# Patient Record
Sex: MALE | Race: WHITE | NOT HISPANIC OR LATINO | Employment: OTHER | URBAN - METROPOLITAN AREA
[De-identification: names, ages, dates, MRNs, and addresses within clinical notes are randomized per-mention and may not be internally consistent; named-entity substitution may affect disease eponyms.]

---

## 2017-01-19 LAB
BILIRUB UR QL STRIP: NEGATIVE
CLARITY UR: ABNORMAL
COLOR UR: YELLOW
GLUCOSE UR STRIP-MCNC: NEGATIVE MG/DL
HGB UR QL STRIP.AUTO: ABNORMAL
KETONES UR QL STRIP: NEGATIVE
LEUKOCYTE ESTERASE UR QL STRIP.AUTO: ABNORMAL
NITRITE UR QL STRIP: NEGATIVE
PH UR STRIP.AUTO: 6 [PH] (ref 5–8)
PROT UR QL STRIP: ABNORMAL
SP GR UR STRIP: 1.02 (ref 1–1.03)
UROBILINOGEN UR QL STRIP: ABNORMAL

## 2017-01-19 PROCEDURE — 87186 SC STD MICRODIL/AGAR DIL: CPT | Performed by: EMERGENCY MEDICINE

## 2017-01-19 PROCEDURE — 51702 INSERT TEMP BLADDER CATH: CPT

## 2017-01-19 PROCEDURE — 81001 URINALYSIS AUTO W/SCOPE: CPT | Performed by: EMERGENCY MEDICINE

## 2017-01-19 PROCEDURE — 87147 CULTURE TYPE IMMUNOLOGIC: CPT | Performed by: EMERGENCY MEDICINE

## 2017-01-19 PROCEDURE — 99284 EMERGENCY DEPT VISIT MOD MDM: CPT

## 2017-01-19 PROCEDURE — 87086 URINE CULTURE/COLONY COUNT: CPT | Performed by: EMERGENCY MEDICINE

## 2017-01-20 ENCOUNTER — ANESTHESIA EVENT (OUTPATIENT)
Dept: PERIOP | Facility: HOSPITAL | Age: 77
End: 2017-01-20

## 2017-01-20 ENCOUNTER — APPOINTMENT (OUTPATIENT)
Dept: CT IMAGING | Facility: HOSPITAL | Age: 77
End: 2017-01-20

## 2017-01-20 ENCOUNTER — ANESTHESIA (OUTPATIENT)
Dept: PERIOP | Facility: HOSPITAL | Age: 77
End: 2017-01-20

## 2017-01-20 ENCOUNTER — HOSPITAL ENCOUNTER (INPATIENT)
Facility: HOSPITAL | Age: 77
LOS: 3 days | Discharge: HOME | End: 2017-01-23
Attending: EMERGENCY MEDICINE | Admitting: UROLOGY

## 2017-01-20 ENCOUNTER — APPOINTMENT (OUTPATIENT)
Dept: GENERAL RADIOLOGY | Facility: HOSPITAL | Age: 77
End: 2017-01-20

## 2017-01-20 DIAGNOSIS — N13.30 HYDRONEPHROSIS OF RIGHT KIDNEY: ICD-10-CM

## 2017-01-20 DIAGNOSIS — J45.901 ASTHMA EXACERBATION: ICD-10-CM

## 2017-01-20 DIAGNOSIS — N12 PYELONEPHRITIS: Primary | ICD-10-CM

## 2017-01-20 LAB
ALBUMIN SERPL-MCNC: 3.7 G/DL (ref 3.5–5.2)
ALBUMIN/GLOB SERPL: 1.4 G/DL
ALP SERPL-CCNC: 62 U/L (ref 39–117)
ALT SERPL W P-5'-P-CCNC: 17 U/L (ref 1–41)
ANION GAP SERPL CALCULATED.3IONS-SCNC: 12.3 MMOL/L
ANION GAP SERPL CALCULATED.3IONS-SCNC: 12.6 MMOL/L
AST SERPL-CCNC: 19 U/L (ref 1–40)
BACTERIA UR QL AUTO: ABNORMAL /HPF
BASOPHILS # BLD AUTO: 0.02 10*3/MM3 (ref 0–0.2)
BASOPHILS # BLD AUTO: 0.03 10*3/MM3 (ref 0–0.2)
BASOPHILS NFR BLD AUTO: 0.2 % (ref 0–1.5)
BASOPHILS NFR BLD AUTO: 0.2 % (ref 0–1.5)
BILIRUB SERPL-MCNC: 0.8 MG/DL (ref 0.1–1.2)
BUN BLD-MCNC: 21 MG/DL (ref 8–23)
BUN BLD-MCNC: 24 MG/DL (ref 8–23)
BUN/CREAT SERPL: 17.5 (ref 7–25)
BUN/CREAT SERPL: 19.2 (ref 7–25)
CALCIUM SPEC-SCNC: 8.3 MG/DL (ref 8.6–10.5)
CALCIUM SPEC-SCNC: 8.4 MG/DL (ref 8.6–10.5)
CHLORIDE SERPL-SCNC: 100 MMOL/L (ref 98–107)
CHLORIDE SERPL-SCNC: 101 MMOL/L (ref 98–107)
CO2 SERPL-SCNC: 24.4 MMOL/L (ref 22–29)
CO2 SERPL-SCNC: 26.7 MMOL/L (ref 22–29)
CREAT BLD-MCNC: 1.2 MG/DL (ref 0.76–1.27)
CREAT BLD-MCNC: 1.25 MG/DL (ref 0.76–1.27)
D-LACTATE SERPL-SCNC: 0.8 MMOL/L (ref 0.5–2)
DEPRECATED RDW RBC AUTO: 45.4 FL (ref 37–54)
DEPRECATED RDW RBC AUTO: 46.6 FL (ref 37–54)
EOSINOPHIL # BLD AUTO: 0.01 10*3/MM3 (ref 0–0.7)
EOSINOPHIL # BLD AUTO: 0.02 10*3/MM3 (ref 0–0.7)
EOSINOPHIL NFR BLD AUTO: 0.1 % (ref 0.3–6.2)
EOSINOPHIL NFR BLD AUTO: 0.2 % (ref 0.3–6.2)
ERYTHROCYTE [DISTWIDTH] IN BLOOD BY AUTOMATED COUNT: 13.1 % (ref 11.5–14.5)
ERYTHROCYTE [DISTWIDTH] IN BLOOD BY AUTOMATED COUNT: 13.2 % (ref 11.5–14.5)
GFR SERPL CREATININE-BSD FRML MDRD: 56 ML/MIN/1.73
GFR SERPL CREATININE-BSD FRML MDRD: 59 ML/MIN/1.73
GLOBULIN UR ELPH-MCNC: 2.7 GM/DL
GLUCOSE BLD-MCNC: 121 MG/DL (ref 65–99)
GLUCOSE BLD-MCNC: 131 MG/DL (ref 65–99)
HCT VFR BLD AUTO: 44.7 % (ref 40.4–52.2)
HCT VFR BLD AUTO: 45.9 % (ref 40.4–52.2)
HGB BLD-MCNC: 14.4 G/DL (ref 13.7–17.6)
HGB BLD-MCNC: 14.7 G/DL (ref 13.7–17.6)
HYALINE CASTS UR QL AUTO: ABNORMAL /LPF
IMM GRANULOCYTES # BLD: 0.04 10*3/MM3 (ref 0–0.03)
IMM GRANULOCYTES # BLD: 0.07 10*3/MM3 (ref 0–0.03)
IMM GRANULOCYTES NFR BLD: 0.3 % (ref 0–0.5)
IMM GRANULOCYTES NFR BLD: 0.5 % (ref 0–0.5)
LYMPHOCYTES # BLD AUTO: 0.92 10*3/MM3 (ref 0.9–4.8)
LYMPHOCYTES # BLD AUTO: 1.07 10*3/MM3 (ref 0.9–4.8)
LYMPHOCYTES NFR BLD AUTO: 7.3 % (ref 19.6–45.3)
LYMPHOCYTES NFR BLD AUTO: 8.4 % (ref 19.6–45.3)
MCH RBC QN AUTO: 30.8 PG (ref 27–32.7)
MCH RBC QN AUTO: 30.9 PG (ref 27–32.7)
MCHC RBC AUTO-ENTMCNC: 32 G/DL (ref 32.6–36.4)
MCHC RBC AUTO-ENTMCNC: 32.2 G/DL (ref 32.6–36.4)
MCV RBC AUTO: 95.9 FL (ref 79.8–96.2)
MCV RBC AUTO: 96 FL (ref 79.8–96.2)
MONOCYTES # BLD AUTO: 1.83 10*3/MM3 (ref 0.2–1.2)
MONOCYTES # BLD AUTO: 1.96 10*3/MM3 (ref 0.2–1.2)
MONOCYTES NFR BLD AUTO: 14.5 % (ref 5–12)
MONOCYTES NFR BLD AUTO: 15.4 % (ref 5–12)
MUCOUS THREADS URNS QL MICRO: ABNORMAL /HPF
NEUTROPHILS # BLD AUTO: 9.6 10*3/MM3 (ref 1.9–8.1)
NEUTROPHILS # BLD AUTO: 9.82 10*3/MM3 (ref 1.9–8.1)
NEUTROPHILS NFR BLD AUTO: 75.3 % (ref 42.7–76)
NEUTROPHILS NFR BLD AUTO: 77.6 % (ref 42.7–76)
PLATELET # BLD AUTO: 175 10*3/MM3 (ref 140–500)
PLATELET # BLD AUTO: 184 10*3/MM3 (ref 140–500)
PMV BLD AUTO: 10.2 FL (ref 6–12)
PMV BLD AUTO: 10.6 FL (ref 6–12)
POTASSIUM BLD-SCNC: 4 MMOL/L (ref 3.5–5.2)
POTASSIUM BLD-SCNC: 4 MMOL/L (ref 3.5–5.2)
PROT SERPL-MCNC: 6.4 G/DL (ref 6–8.5)
RBC # BLD AUTO: 4.66 10*6/MM3 (ref 4.6–6)
RBC # BLD AUTO: 4.78 10*6/MM3 (ref 4.6–6)
RBC # UR: ABNORMAL /HPF
REF LAB TEST METHOD: ABNORMAL
SODIUM BLD-SCNC: 137 MMOL/L (ref 136–145)
SODIUM BLD-SCNC: 140 MMOL/L (ref 136–145)
SQUAMOUS #/AREA URNS HPF: ABNORMAL /HPF
WBC NRBC COR # BLD: 12.65 10*3/MM3 (ref 4.5–10.7)
WBC NRBC COR # BLD: 12.74 10*3/MM3 (ref 4.5–10.7)
WBC UR QL AUTO: ABNORMAL /HPF

## 2017-01-20 PROCEDURE — 87186 SC STD MICRODIL/AGAR DIL: CPT | Performed by: EMERGENCY MEDICINE

## 2017-01-20 PROCEDURE — 0T768DZ DILATION OF RIGHT URETER WITH INTRALUMINAL DEVICE, VIA NATURAL OR ARTIFICIAL OPENING ENDOSCOPIC: ICD-10-PCS | Performed by: UROLOGY

## 2017-01-20 PROCEDURE — 87147 CULTURE TYPE IMMUNOLOGIC: CPT | Performed by: EMERGENCY MEDICINE

## 2017-01-20 PROCEDURE — 83605 ASSAY OF LACTIC ACID: CPT | Performed by: EMERGENCY MEDICINE

## 2017-01-20 PROCEDURE — 94640 AIRWAY INHALATION TREATMENT: CPT

## 2017-01-20 PROCEDURE — 25010000002 ONDANSETRON PER 1 MG: Performed by: ANESTHESIOLOGY

## 2017-01-20 PROCEDURE — 87086 URINE CULTURE/COLONY COUNT: CPT | Performed by: UROLOGY

## 2017-01-20 PROCEDURE — 74420 UROGRAPHY RTRGR +-KUB: CPT

## 2017-01-20 PROCEDURE — C2617 STENT, NON-COR, TEM W/O DEL: HCPCS | Performed by: UROLOGY

## 2017-01-20 PROCEDURE — 25010000003 CEFTRIAXONE PER 250 MG: Performed by: EMERGENCY MEDICINE

## 2017-01-20 PROCEDURE — BT140ZZ FLUOROSCOPY OF KIDNEYS, URETERS AND BLADDER USING HIGH OSMOLAR CONTRAST: ICD-10-PCS | Performed by: UROLOGY

## 2017-01-20 PROCEDURE — C1769 GUIDE WIRE: HCPCS | Performed by: UROLOGY

## 2017-01-20 PROCEDURE — 85025 COMPLETE CBC W/AUTO DIFF WBC: CPT | Performed by: EMERGENCY MEDICINE

## 2017-01-20 PROCEDURE — 25010000002 PROPOFOL 10 MG/ML EMULSION: Performed by: ANESTHESIOLOGY

## 2017-01-20 PROCEDURE — 87150 DNA/RNA AMPLIFIED PROBE: CPT | Performed by: EMERGENCY MEDICINE

## 2017-01-20 PROCEDURE — 25010000002 MIDAZOLAM PER 1 MG: Performed by: ANESTHESIOLOGY

## 2017-01-20 PROCEDURE — C1758 CATHETER, URETERAL: HCPCS | Performed by: UROLOGY

## 2017-01-20 PROCEDURE — 25010000002 FENTANYL CITRATE (PF) 100 MCG/2ML SOLUTION: Performed by: ANESTHESIOLOGY

## 2017-01-20 PROCEDURE — 80053 COMPREHEN METABOLIC PANEL: CPT | Performed by: EMERGENCY MEDICINE

## 2017-01-20 PROCEDURE — 74176 CT ABD & PELVIS W/O CONTRAST: CPT

## 2017-01-20 PROCEDURE — 0 IOTHALAMATE 60 % SOLUTION: Performed by: UROLOGY

## 2017-01-20 PROCEDURE — 85025 COMPLETE CBC W/AUTO DIFF WBC: CPT | Performed by: UROLOGY

## 2017-01-20 PROCEDURE — 87040 BLOOD CULTURE FOR BACTERIA: CPT | Performed by: EMERGENCY MEDICINE

## 2017-01-20 PROCEDURE — 25010000002 ENOXAPARIN PER 10 MG: Performed by: UROLOGY

## 2017-01-20 DEVICE — URETERAL STENT
Type: IMPLANTABLE DEVICE | Site: URETER | Status: FUNCTIONAL
Brand: CONTOUR™

## 2017-01-20 RX ORDER — ALBUTEROL SULFATE 1.25 MG/3ML
2.5 SOLUTION RESPIRATORY (INHALATION) EVERY 6 HOURS PRN
Status: DISCONTINUED | OUTPATIENT
Start: 2017-01-20 | End: 2017-01-23 | Stop reason: HOSPADM

## 2017-01-20 RX ORDER — LABETALOL HYDROCHLORIDE 5 MG/ML
5 INJECTION, SOLUTION INTRAVENOUS
Status: DISCONTINUED | OUTPATIENT
Start: 2017-01-20 | End: 2017-01-20 | Stop reason: HOSPADM

## 2017-01-20 RX ORDER — LIDOCAINE HYDROCHLORIDE 10 MG/ML
0.5 INJECTION, SOLUTION EPIDURAL; INFILTRATION; INTRACAUDAL; PERINEURAL ONCE AS NEEDED
Status: DISCONTINUED | OUTPATIENT
Start: 2017-01-20 | End: 2017-01-20 | Stop reason: HOSPADM

## 2017-01-20 RX ORDER — PANTOPRAZOLE SODIUM 40 MG/1
40 TABLET, DELAYED RELEASE ORAL
Status: DISCONTINUED | OUTPATIENT
Start: 2017-01-21 | End: 2017-01-23 | Stop reason: HOSPADM

## 2017-01-20 RX ORDER — ONDANSETRON 2 MG/ML
4 INJECTION INTRAMUSCULAR; INTRAVENOUS EVERY 6 HOURS PRN
Status: DISCONTINUED | OUTPATIENT
Start: 2017-01-20 | End: 2017-01-23 | Stop reason: HOSPADM

## 2017-01-20 RX ORDER — HYDROCODONE BITARTRATE AND ACETAMINOPHEN 5; 325 MG/1; MG/1
1 TABLET ORAL EVERY 6 HOURS PRN
Status: ON HOLD | COMMUNITY
End: 2017-03-08

## 2017-01-20 RX ORDER — HYDROCODONE BITARTRATE AND ACETAMINOPHEN 5; 325 MG/1; MG/1
1 TABLET ORAL EVERY 6 HOURS PRN
Status: DISCONTINUED | OUTPATIENT
Start: 2017-01-20 | End: 2017-01-23 | Stop reason: HOSPADM

## 2017-01-20 RX ORDER — FAMOTIDINE 10 MG/ML
20 INJECTION, SOLUTION INTRAVENOUS ONCE
Status: COMPLETED | OUTPATIENT
Start: 2017-01-20 | End: 2017-01-20

## 2017-01-20 RX ORDER — ALBUTEROL SULFATE 90 UG/1
2 AEROSOL, METERED RESPIRATORY (INHALATION) EVERY 4 HOURS PRN
Status: DISCONTINUED | OUTPATIENT
Start: 2017-01-20 | End: 2017-01-20 | Stop reason: CLARIF

## 2017-01-20 RX ORDER — FENTANYL CITRATE 50 UG/ML
50 INJECTION, SOLUTION INTRAMUSCULAR; INTRAVENOUS
Status: DISCONTINUED | OUTPATIENT
Start: 2017-01-20 | End: 2017-01-20 | Stop reason: HOSPADM

## 2017-01-20 RX ORDER — SUMATRIPTAN 50 MG/1
50 TABLET, FILM COATED ORAL
Status: DISCONTINUED | OUTPATIENT
Start: 2017-01-20 | End: 2017-01-23 | Stop reason: HOSPADM

## 2017-01-20 RX ORDER — MIDAZOLAM HYDROCHLORIDE 1 MG/ML
2 INJECTION INTRAMUSCULAR; INTRAVENOUS
Status: DISCONTINUED | OUTPATIENT
Start: 2017-01-20 | End: 2017-01-20 | Stop reason: HOSPADM

## 2017-01-20 RX ORDER — SODIUM CHLORIDE 0.9 % (FLUSH) 0.9 %
1-10 SYRINGE (ML) INJECTION AS NEEDED
Status: DISCONTINUED | OUTPATIENT
Start: 2017-01-20 | End: 2017-01-23 | Stop reason: HOSPADM

## 2017-01-20 RX ORDER — PREDNISONE 20 MG/1
20 TABLET ORAL DAILY
Status: DISCONTINUED | OUTPATIENT
Start: 2017-01-20 | End: 2017-01-21

## 2017-01-20 RX ORDER — PROMETHAZINE HYDROCHLORIDE 25 MG/ML
12.5 INJECTION, SOLUTION INTRAMUSCULAR; INTRAVENOUS ONCE AS NEEDED
Status: DISCONTINUED | OUTPATIENT
Start: 2017-01-20 | End: 2017-01-20 | Stop reason: HOSPADM

## 2017-01-20 RX ORDER — LIDOCAINE HYDROCHLORIDE 20 MG/ML
INJECTION, SOLUTION INFILTRATION; PERINEURAL AS NEEDED
Status: DISCONTINUED | OUTPATIENT
Start: 2017-01-20 | End: 2017-01-20 | Stop reason: SURG

## 2017-01-20 RX ORDER — HYDRALAZINE HYDROCHLORIDE 20 MG/ML
5 INJECTION INTRAMUSCULAR; INTRAVENOUS
Status: DISCONTINUED | OUTPATIENT
Start: 2017-01-20 | End: 2017-01-20 | Stop reason: HOSPADM

## 2017-01-20 RX ORDER — SODIUM CHLORIDE 0.9 % (FLUSH) 0.9 %
1-10 SYRINGE (ML) INJECTION AS NEEDED
Status: DISCONTINUED | OUTPATIENT
Start: 2017-01-20 | End: 2017-01-20 | Stop reason: HOSPADM

## 2017-01-20 RX ORDER — ONDANSETRON 2 MG/ML
INJECTION INTRAMUSCULAR; INTRAVENOUS AS NEEDED
Status: DISCONTINUED | OUTPATIENT
Start: 2017-01-20 | End: 2017-01-20 | Stop reason: SURG

## 2017-01-20 RX ORDER — CEFTRIAXONE SODIUM 2 G/50ML
2 INJECTION, SOLUTION INTRAVENOUS ONCE
Status: COMPLETED | OUTPATIENT
Start: 2017-01-20 | End: 2017-01-20

## 2017-01-20 RX ORDER — GLYCOPYRROLATE 0.2 MG/ML
INJECTION INTRAMUSCULAR; INTRAVENOUS AS NEEDED
Status: DISCONTINUED | OUTPATIENT
Start: 2017-01-20 | End: 2017-01-20 | Stop reason: SURG

## 2017-01-20 RX ORDER — DIPHENHYDRAMINE HYDROCHLORIDE 50 MG/ML
12.5 INJECTION INTRAMUSCULAR; INTRAVENOUS
Status: DISCONTINUED | OUTPATIENT
Start: 2017-01-20 | End: 2017-01-20 | Stop reason: HOSPADM

## 2017-01-20 RX ORDER — SUMATRIPTAN 25 MG/1
50 TABLET, FILM COATED ORAL
Status: DISCONTINUED | OUTPATIENT
Start: 2017-01-20 | End: 2017-01-23 | Stop reason: HOSPADM

## 2017-01-20 RX ORDER — PROPOFOL 10 MG/ML
VIAL (ML) INTRAVENOUS AS NEEDED
Status: DISCONTINUED | OUTPATIENT
Start: 2017-01-20 | End: 2017-01-20 | Stop reason: SURG

## 2017-01-20 RX ORDER — HYDROMORPHONE HYDROCHLORIDE 1 MG/ML
0.5 INJECTION, SOLUTION INTRAMUSCULAR; INTRAVENOUS; SUBCUTANEOUS
Status: DISCONTINUED | OUTPATIENT
Start: 2017-01-20 | End: 2017-01-23 | Stop reason: HOSPADM

## 2017-01-20 RX ORDER — NALOXONE HCL 0.4 MG/ML
0.4 VIAL (ML) INJECTION
Status: DISCONTINUED | OUTPATIENT
Start: 2017-01-20 | End: 2017-01-23 | Stop reason: HOSPADM

## 2017-01-20 RX ORDER — ACETAMINOPHEN 500 MG
1000 TABLET ORAL ONCE
Status: COMPLETED | OUTPATIENT
Start: 2017-01-20 | End: 2017-01-20

## 2017-01-20 RX ORDER — PROMETHAZINE HYDROCHLORIDE 25 MG/1
25 TABLET ORAL ONCE AS NEEDED
Status: DISCONTINUED | OUTPATIENT
Start: 2017-01-20 | End: 2017-01-20 | Stop reason: HOSPADM

## 2017-01-20 RX ORDER — MAGNESIUM HYDROXIDE 1200 MG/15ML
LIQUID ORAL AS NEEDED
Status: DISCONTINUED | OUTPATIENT
Start: 2017-01-20 | End: 2017-01-20 | Stop reason: HOSPADM

## 2017-01-20 RX ORDER — BUDESONIDE 0.5 MG/2ML
0.5 INHALANT ORAL
Status: DISCONTINUED | OUTPATIENT
Start: 2017-01-20 | End: 2017-01-23 | Stop reason: HOSPADM

## 2017-01-20 RX ORDER — ATORVASTATIN CALCIUM 20 MG/1
20 TABLET, FILM COATED ORAL DAILY
Status: DISCONTINUED | OUTPATIENT
Start: 2017-01-20 | End: 2017-01-23 | Stop reason: HOSPADM

## 2017-01-20 RX ORDER — SODIUM CHLORIDE, SODIUM LACTATE, POTASSIUM CHLORIDE, CALCIUM CHLORIDE 600; 310; 30; 20 MG/100ML; MG/100ML; MG/100ML; MG/100ML
9 INJECTION, SOLUTION INTRAVENOUS CONTINUOUS PRN
Status: DISCONTINUED | OUTPATIENT
Start: 2017-01-20 | End: 2017-01-20 | Stop reason: HOSPADM

## 2017-01-20 RX ORDER — ALBUTEROL SULFATE 2.5 MG/3ML
2.5 SOLUTION RESPIRATORY (INHALATION) EVERY 4 HOURS PRN
Status: DISCONTINUED | OUTPATIENT
Start: 2017-01-20 | End: 2017-01-23 | Stop reason: HOSPADM

## 2017-01-20 RX ORDER — PROMETHAZINE HYDROCHLORIDE 25 MG/1
25 SUPPOSITORY RECTAL ONCE AS NEEDED
Status: DISCONTINUED | OUTPATIENT
Start: 2017-01-20 | End: 2017-01-20 | Stop reason: HOSPADM

## 2017-01-20 RX ORDER — SODIUM CHLORIDE 9 MG/ML
50 INJECTION, SOLUTION INTRAVENOUS CONTINUOUS
Status: DISCONTINUED | OUTPATIENT
Start: 2017-01-20 | End: 2017-01-23 | Stop reason: HOSPADM

## 2017-01-20 RX ORDER — ONDANSETRON 2 MG/ML
4 INJECTION INTRAMUSCULAR; INTRAVENOUS ONCE AS NEEDED
Status: DISCONTINUED | OUTPATIENT
Start: 2017-01-20 | End: 2017-01-20 | Stop reason: HOSPADM

## 2017-01-20 RX ORDER — FENTANYL CITRATE 50 UG/ML
INJECTION, SOLUTION INTRAMUSCULAR; INTRAVENOUS AS NEEDED
Status: DISCONTINUED | OUTPATIENT
Start: 2017-01-20 | End: 2017-01-20 | Stop reason: SURG

## 2017-01-20 RX ORDER — MIDAZOLAM HYDROCHLORIDE 1 MG/ML
1 INJECTION INTRAMUSCULAR; INTRAVENOUS
Status: DISCONTINUED | OUTPATIENT
Start: 2017-01-20 | End: 2017-01-20 | Stop reason: HOSPADM

## 2017-01-20 RX ORDER — HYDROMORPHONE HYDROCHLORIDE 1 MG/ML
0.25 INJECTION, SOLUTION INTRAMUSCULAR; INTRAVENOUS; SUBCUTANEOUS
Status: DISCONTINUED | OUTPATIENT
Start: 2017-01-20 | End: 2017-01-20 | Stop reason: HOSPADM

## 2017-01-20 RX ADMIN — FENTANYL CITRATE 50 MCG: 50 INJECTION INTRAMUSCULAR; INTRAVENOUS at 18:01

## 2017-01-20 RX ADMIN — CEFTRIAXONE SODIUM 2 G: 2 INJECTION, SOLUTION INTRAVENOUS at 02:52

## 2017-01-20 RX ADMIN — ACETAMINOPHEN 1000 MG: 500 TABLET ORAL at 02:53

## 2017-01-20 RX ADMIN — MIDAZOLAM 1 MG: 1 INJECTION INTRAMUSCULAR; INTRAVENOUS at 15:35

## 2017-01-20 RX ADMIN — SODIUM CHLORIDE 100 ML/HR: 9 INJECTION, SOLUTION INTRAVENOUS at 08:17

## 2017-01-20 RX ADMIN — BUDESONIDE 0.5 MG: 0.5 INHALANT RESPIRATORY (INHALATION) at 20:17

## 2017-01-20 RX ADMIN — SODIUM CHLORIDE, POTASSIUM CHLORIDE, SODIUM LACTATE AND CALCIUM CHLORIDE 9 ML/HR: 600; 310; 30; 20 INJECTION, SOLUTION INTRAVENOUS at 14:55

## 2017-01-20 RX ADMIN — SODIUM CHLORIDE 100 ML/HR: 9 INJECTION, SOLUTION INTRAVENOUS at 22:09

## 2017-01-20 RX ADMIN — FAMOTIDINE 20 MG: 10 INJECTION, SOLUTION INTRAVENOUS at 15:35

## 2017-01-20 RX ADMIN — ENOXAPARIN SODIUM 40 MG: 40 INJECTION SUBCUTANEOUS at 08:17

## 2017-01-20 RX ADMIN — HYDROCODONE BITARTRATE AND ACETAMINOPHEN 1 TABLET: 5; 325 TABLET ORAL at 21:59

## 2017-01-20 RX ADMIN — ONDANSETRON 4 MG: 2 INJECTION INTRAMUSCULAR; INTRAVENOUS at 16:17

## 2017-01-20 RX ADMIN — GLYCOPYRROLATE 0.2 MG: 0.2 INJECTION INTRAMUSCULAR; INTRAVENOUS at 16:05

## 2017-01-20 RX ADMIN — FENTANYL CITRATE 50 MCG: 50 INJECTION INTRAMUSCULAR; INTRAVENOUS at 15:35

## 2017-01-20 RX ADMIN — LIDOCAINE HYDROCHLORIDE 60 MG: 20 INJECTION, SOLUTION INFILTRATION; PERINEURAL at 16:05

## 2017-01-20 RX ADMIN — PROPOFOL 150 MG: 10 INJECTION, EMULSION INTRAVENOUS at 16:05

## 2017-01-20 RX ADMIN — SUMATRIPTAN SUCCINATE 50 MG: 25 TABLET ORAL at 10:42

## 2017-01-20 RX ADMIN — SODIUM CHLORIDE 100 ML/HR: 9 INJECTION, SOLUTION INTRAVENOUS at 18:23

## 2017-01-20 RX ADMIN — FENTANYL CITRATE 50 MCG: 50 INJECTION INTRAMUSCULAR; INTRAVENOUS at 16:05

## 2017-01-20 NOTE — ANESTHESIA PREPROCEDURE EVALUATION
Anesthesia Evaluation      No history of anesthetic complications   Airway   Mallampati: III  no difficulty expected  Dental - normal exam     Pulmonary - normal exam   (+) asthma,   (-) COPD, sleep apnea, not a smoker    PE comment: nonlabored  Cardiovascular - negative cardio ROS and normal exam  (-) hypertension, valvular problems/murmurs, past MI, CAD, dysrhythmias, angina    Rhythm: regular  Rate: normal    Neuro/Psych  (+) headaches (migraines),    (-) seizures, TIA, CVA  GI/Hepatic/Renal/Endo    (+)  chronic renal disease (pyelonephritis, hydronephrosis), hypothyroidism,   (-) GERD, liver disease, diabetes, hyperthyroidism    Musculoskeletal     (+) back pain,   Abdominal    Substance History      OB/GYN          Other         Other Comment: HLD;  BPH                        Anesthesia Plan    ASA 2     general     intravenous induction   Anesthetic plan and risks discussed with patient.

## 2017-01-20 NOTE — ANESTHESIA PROCEDURE NOTES
Airway  Urgency: elective    Airway not difficult    General Information and Staff    Patient location during procedure: OR  Anesthesiologist: CONCETTA ZULUAGA    Indications and Patient Condition  Indications for airway management: airway protection    Preoxygenated: yes      Final Airway Details  Final airway type: supraglottic airway      Successful airway: classic  Size 5    Number of attempts at approach: 1

## 2017-01-20 NOTE — ANESTHESIA POSTPROCEDURE EVALUATION
Patient: Asa Magaña    Procedure Summary     Date Anesthesia Start Anesthesia Stop Room / Location    01/20/17 4932 [id number]  [facility] / [facility]       Procedure Diagnosis Surgeon Provider    CYSTOSCOPY, ureteroscopy,  URETERAL STENT INSERTION, RT (Right ) Hydronephrosis of right kidney; Vesicoureteral reflux; Pyelonephritis, acute MD Checo Chiu MD          Anesthesia Type: general  Last vitals  BP      Temp 36.9 °C (98.5 °F) (01/20/17 1633)    Pulse     Resp      SpO2        Post Anesthesia Care and Evaluation    Patient location during evaluation: bedside  Level of consciousness: awake  Pain score: 1  Pain management: adequate  Airway patency: patent  Anesthetic complications: No anesthetic complications    Cardiovascular status: acceptable  Respiratory status: acceptable  Hydration status: acceptable

## 2017-01-21 LAB
ANION GAP SERPL CALCULATED.3IONS-SCNC: 11.8 MMOL/L
BACTERIA BLD CULT: ABNORMAL
BASOPHILS # BLD AUTO: 0.04 10*3/MM3 (ref 0–0.2)
BASOPHILS NFR BLD AUTO: 0.4 % (ref 0–1.5)
BUN BLD-MCNC: 20 MG/DL (ref 8–23)
BUN/CREAT SERPL: 18.7 (ref 7–25)
CALCIUM SPEC-SCNC: 8.2 MG/DL (ref 8.6–10.5)
CHLORIDE SERPL-SCNC: 102 MMOL/L (ref 98–107)
CO2 SERPL-SCNC: 24.2 MMOL/L (ref 22–29)
CREAT BLD-MCNC: 1.07 MG/DL (ref 0.76–1.27)
DEPRECATED RDW RBC AUTO: 47.3 FL (ref 37–54)
EOSINOPHIL # BLD AUTO: 0.08 10*3/MM3 (ref 0–0.7)
EOSINOPHIL NFR BLD AUTO: 0.7 % (ref 0.3–6.2)
ERYTHROCYTE [DISTWIDTH] IN BLOOD BY AUTOMATED COUNT: 13.2 % (ref 11.5–14.5)
GFR SERPL CREATININE-BSD FRML MDRD: 68 ML/MIN/1.73
GLUCOSE BLD-MCNC: 155 MG/DL (ref 65–99)
HCT VFR BLD AUTO: 44.5 % (ref 40.4–52.2)
HGB BLD-MCNC: 14.2 G/DL (ref 13.7–17.6)
IMM GRANULOCYTES # BLD: 0.07 10*3/MM3 (ref 0–0.03)
IMM GRANULOCYTES NFR BLD: 0.6 % (ref 0–0.5)
LYMPHOCYTES # BLD AUTO: 1.27 10*3/MM3 (ref 0.9–4.8)
LYMPHOCYTES NFR BLD AUTO: 11.6 % (ref 19.6–45.3)
MCH RBC QN AUTO: 31.1 PG (ref 27–32.7)
MCHC RBC AUTO-ENTMCNC: 31.9 G/DL (ref 32.6–36.4)
MCV RBC AUTO: 97.6 FL (ref 79.8–96.2)
MONOCYTES # BLD AUTO: 1.29 10*3/MM3 (ref 0.2–1.2)
MONOCYTES NFR BLD AUTO: 11.8 % (ref 5–12)
NEUTROPHILS # BLD AUTO: 8.21 10*3/MM3 (ref 1.9–8.1)
NEUTROPHILS NFR BLD AUTO: 74.9 % (ref 42.7–76)
PLATELET # BLD AUTO: 178 10*3/MM3 (ref 140–500)
PMV BLD AUTO: 10.2 FL (ref 6–12)
POTASSIUM BLD-SCNC: 4.1 MMOL/L (ref 3.5–5.2)
RBC # BLD AUTO: 4.56 10*6/MM3 (ref 4.6–6)
SODIUM BLD-SCNC: 138 MMOL/L (ref 136–145)
WBC NRBC COR # BLD: 10.96 10*3/MM3 (ref 4.5–10.7)

## 2017-01-21 PROCEDURE — 85025 COMPLETE CBC W/AUTO DIFF WBC: CPT | Performed by: UROLOGY

## 2017-01-21 PROCEDURE — 87040 BLOOD CULTURE FOR BACTERIA: CPT | Performed by: UROLOGY

## 2017-01-21 PROCEDURE — 25010000002 VANCOMYCIN: Performed by: INTERNAL MEDICINE

## 2017-01-21 PROCEDURE — 25010000002 ENOXAPARIN PER 10 MG: Performed by: UROLOGY

## 2017-01-21 PROCEDURE — 80048 BASIC METABOLIC PNL TOTAL CA: CPT | Performed by: UROLOGY

## 2017-01-21 PROCEDURE — 94640 AIRWAY INHALATION TREATMENT: CPT

## 2017-01-21 RX ADMIN — ENOXAPARIN SODIUM 40 MG: 40 INJECTION SUBCUTANEOUS at 08:57

## 2017-01-21 RX ADMIN — VANCOMYCIN HYDROCHLORIDE 1750 MG: 1 INJECTION, POWDER, LYOPHILIZED, FOR SOLUTION INTRAVENOUS at 14:47

## 2017-01-21 RX ADMIN — HYDROCODONE BITARTRATE AND ACETAMINOPHEN 1 TABLET: 5; 325 TABLET ORAL at 06:30

## 2017-01-21 RX ADMIN — HYDROCODONE BITARTRATE AND ACETAMINOPHEN 1 TABLET: 5; 325 TABLET ORAL at 13:48

## 2017-01-21 RX ADMIN — PANTOPRAZOLE SODIUM 40 MG: 40 TABLET, DELAYED RELEASE ORAL at 06:30

## 2017-01-21 RX ADMIN — BUDESONIDE 0.5 MG: 0.5 INHALANT RESPIRATORY (INHALATION) at 23:34

## 2017-01-21 RX ADMIN — SODIUM CHLORIDE 100 ML/HR: 9 INJECTION, SOLUTION INTRAVENOUS at 14:50

## 2017-01-21 RX ADMIN — ATORVASTATIN CALCIUM 20 MG: 20 TABLET, FILM COATED ORAL at 08:57

## 2017-01-21 RX ADMIN — SODIUM CHLORIDE 100 ML/HR: 9 INJECTION, SOLUTION INTRAVENOUS at 06:56

## 2017-01-22 LAB
ANION GAP SERPL CALCULATED.3IONS-SCNC: 8.7 MMOL/L
BACTERIA SPEC AEROBE CULT: ABNORMAL
BACTERIA SPEC AEROBE CULT: NO GROWTH
BASOPHILS # BLD AUTO: 0.02 10*3/MM3 (ref 0–0.2)
BASOPHILS NFR BLD AUTO: 0.3 % (ref 0–1.5)
BUN BLD-MCNC: 16 MG/DL (ref 8–23)
BUN/CREAT SERPL: 15.1 (ref 7–25)
CALCIUM SPEC-SCNC: 7.9 MG/DL (ref 8.6–10.5)
CHLORIDE SERPL-SCNC: 103 MMOL/L (ref 98–107)
CO2 SERPL-SCNC: 26.3 MMOL/L (ref 22–29)
CREAT BLD-MCNC: 1.06 MG/DL (ref 0.76–1.27)
DEPRECATED RDW RBC AUTO: 46.8 FL (ref 37–54)
EOSINOPHIL # BLD AUTO: 0.11 10*3/MM3 (ref 0–0.7)
EOSINOPHIL NFR BLD AUTO: 1.4 % (ref 0.3–6.2)
ERYTHROCYTE [DISTWIDTH] IN BLOOD BY AUTOMATED COUNT: 13.1 % (ref 11.5–14.5)
GFR SERPL CREATININE-BSD FRML MDRD: 68 ML/MIN/1.73
GLUCOSE BLD-MCNC: 108 MG/DL (ref 65–99)
HCT VFR BLD AUTO: 40.8 % (ref 40.4–52.2)
HGB BLD-MCNC: 12.9 G/DL (ref 13.7–17.6)
IMM GRANULOCYTES # BLD: 0.04 10*3/MM3 (ref 0–0.03)
IMM GRANULOCYTES NFR BLD: 0.5 % (ref 0–0.5)
LYMPHOCYTES # BLD AUTO: 0.94 10*3/MM3 (ref 0.9–4.8)
LYMPHOCYTES NFR BLD AUTO: 11.8 % (ref 19.6–45.3)
MCH RBC QN AUTO: 30.9 PG (ref 27–32.7)
MCHC RBC AUTO-ENTMCNC: 31.6 G/DL (ref 32.6–36.4)
MCV RBC AUTO: 97.6 FL (ref 79.8–96.2)
MONOCYTES # BLD AUTO: 1.03 10*3/MM3 (ref 0.2–1.2)
MONOCYTES NFR BLD AUTO: 13 % (ref 5–12)
NEUTROPHILS # BLD AUTO: 5.81 10*3/MM3 (ref 1.9–8.1)
NEUTROPHILS NFR BLD AUTO: 73 % (ref 42.7–76)
PLATELET # BLD AUTO: 166 10*3/MM3 (ref 140–500)
PMV BLD AUTO: 10.2 FL (ref 6–12)
POTASSIUM BLD-SCNC: 4.3 MMOL/L (ref 3.5–5.2)
RBC # BLD AUTO: 4.18 10*6/MM3 (ref 4.6–6)
SODIUM BLD-SCNC: 138 MMOL/L (ref 136–145)
WBC NRBC COR # BLD: 7.95 10*3/MM3 (ref 4.5–10.7)

## 2017-01-22 PROCEDURE — 80048 BASIC METABOLIC PNL TOTAL CA: CPT | Performed by: UROLOGY

## 2017-01-22 PROCEDURE — 85025 COMPLETE CBC W/AUTO DIFF WBC: CPT | Performed by: UROLOGY

## 2017-01-22 PROCEDURE — 25010000002 HYDROMORPHONE PER 4 MG: Performed by: UROLOGY

## 2017-01-22 PROCEDURE — 25010000002 VANCOMYCIN: Performed by: INTERNAL MEDICINE

## 2017-01-22 PROCEDURE — 25010000002 ENOXAPARIN PER 10 MG: Performed by: UROLOGY

## 2017-01-22 RX ADMIN — SODIUM CHLORIDE 50 ML/HR: 9 INJECTION, SOLUTION INTRAVENOUS at 22:30

## 2017-01-22 RX ADMIN — VANCOMYCIN HYDROCHLORIDE 1500 MG: 1 INJECTION, POWDER, LYOPHILIZED, FOR SOLUTION INTRAVENOUS at 17:02

## 2017-01-22 RX ADMIN — HYDROCODONE BITARTRATE AND ACETAMINOPHEN 1 TABLET: 5; 325 TABLET ORAL at 21:52

## 2017-01-22 RX ADMIN — HYDROMORPHONE HYDROCHLORIDE 0.5 MG: 1 INJECTION, SOLUTION INTRAMUSCULAR; INTRAVENOUS; SUBCUTANEOUS at 22:20

## 2017-01-22 RX ADMIN — PANTOPRAZOLE SODIUM 40 MG: 40 TABLET, DELAYED RELEASE ORAL at 05:40

## 2017-01-22 RX ADMIN — HYDROMORPHONE HYDROCHLORIDE 0.5 MG: 1 INJECTION, SOLUTION INTRAMUSCULAR; INTRAVENOUS; SUBCUTANEOUS at 20:13

## 2017-01-22 RX ADMIN — ENOXAPARIN SODIUM 40 MG: 40 INJECTION SUBCUTANEOUS at 09:04

## 2017-01-22 RX ADMIN — ATORVASTATIN CALCIUM 20 MG: 20 TABLET, FILM COATED ORAL at 09:03

## 2017-01-22 RX ADMIN — HYDROCODONE BITARTRATE AND ACETAMINOPHEN 1 TABLET: 5; 325 TABLET ORAL at 05:40

## 2017-01-22 RX ADMIN — VANCOMYCIN HYDROCHLORIDE 1500 MG: 1 INJECTION, POWDER, LYOPHILIZED, FOR SOLUTION INTRAVENOUS at 03:34

## 2017-01-22 RX ADMIN — HYDROCODONE BITARTRATE AND ACETAMINOPHEN 1 TABLET: 5; 325 TABLET ORAL at 12:33

## 2017-01-23 VITALS
HEART RATE: 63 BPM | TEMPERATURE: 97.2 F | BODY MASS INDEX: 27.11 KG/M2 | HEIGHT: 72 IN | SYSTOLIC BLOOD PRESSURE: 131 MMHG | RESPIRATION RATE: 16 BRPM | DIASTOLIC BLOOD PRESSURE: 65 MMHG | WEIGHT: 200.19 LBS | OXYGEN SATURATION: 99 %

## 2017-01-23 LAB
ANION GAP SERPL CALCULATED.3IONS-SCNC: 10.2 MMOL/L
BACTERIA SPEC AEROBE CULT: ABNORMAL
BASOPHILS # BLD AUTO: 0.01 10*3/MM3 (ref 0–0.2)
BASOPHILS NFR BLD AUTO: 0.1 % (ref 0–1.5)
BUN BLD-MCNC: 15 MG/DL (ref 8–23)
BUN/CREAT SERPL: 14.2 (ref 7–25)
CALCIUM SPEC-SCNC: 8.5 MG/DL (ref 8.6–10.5)
CHLORIDE SERPL-SCNC: 102 MMOL/L (ref 98–107)
CO2 SERPL-SCNC: 27.8 MMOL/L (ref 22–29)
CREAT BLD-MCNC: 1.06 MG/DL (ref 0.76–1.27)
DEPRECATED RDW RBC AUTO: 44 FL (ref 37–54)
EOSINOPHIL # BLD AUTO: 0.09 10*3/MM3 (ref 0–0.7)
EOSINOPHIL NFR BLD AUTO: 1.2 % (ref 0.3–6.2)
ERYTHROCYTE [DISTWIDTH] IN BLOOD BY AUTOMATED COUNT: 12.7 % (ref 11.5–14.5)
GFR SERPL CREATININE-BSD FRML MDRD: 68 ML/MIN/1.73
GLUCOSE BLD-MCNC: 133 MG/DL (ref 65–99)
GRAM STN SPEC: ABNORMAL
HCT VFR BLD AUTO: 42.2 % (ref 40.4–52.2)
HGB BLD-MCNC: 13.8 G/DL (ref 13.7–17.6)
IMM GRANULOCYTES # BLD: 0.03 10*3/MM3 (ref 0–0.03)
IMM GRANULOCYTES NFR BLD: 0.4 % (ref 0–0.5)
LYMPHOCYTES # BLD AUTO: 1.1 10*3/MM3 (ref 0.9–4.8)
LYMPHOCYTES NFR BLD AUTO: 14.9 % (ref 19.6–45.3)
MCH RBC QN AUTO: 31 PG (ref 27–32.7)
MCHC RBC AUTO-ENTMCNC: 32.7 G/DL (ref 32.6–36.4)
MCV RBC AUTO: 94.8 FL (ref 79.8–96.2)
MONOCYTES # BLD AUTO: 0.8 10*3/MM3 (ref 0.2–1.2)
MONOCYTES NFR BLD AUTO: 10.8 % (ref 5–12)
NEUTROPHILS # BLD AUTO: 5.37 10*3/MM3 (ref 1.9–8.1)
NEUTROPHILS NFR BLD AUTO: 72.6 % (ref 42.7–76)
PLATELET # BLD AUTO: 195 10*3/MM3 (ref 140–500)
PMV BLD AUTO: 10.5 FL (ref 6–12)
POTASSIUM BLD-SCNC: 4 MMOL/L (ref 3.5–5.2)
RBC # BLD AUTO: 4.45 10*6/MM3 (ref 4.6–6)
SODIUM BLD-SCNC: 140 MMOL/L (ref 136–145)
WBC NRBC COR # BLD: 7.4 10*3/MM3 (ref 4.5–10.7)

## 2017-01-23 PROCEDURE — 25010000002 ENOXAPARIN PER 10 MG: Performed by: UROLOGY

## 2017-01-23 PROCEDURE — 85025 COMPLETE CBC W/AUTO DIFF WBC: CPT | Performed by: UROLOGY

## 2017-01-23 PROCEDURE — 80048 BASIC METABOLIC PNL TOTAL CA: CPT | Performed by: UROLOGY

## 2017-01-23 PROCEDURE — 25010000002 VANCOMYCIN: Performed by: INTERNAL MEDICINE

## 2017-01-23 RX ORDER — DOXYCYCLINE 100 MG/1
100 CAPSULE ORAL EVERY 12 HOURS SCHEDULED
Status: DISCONTINUED | OUTPATIENT
Start: 2017-01-23 | End: 2017-01-23 | Stop reason: HOSPADM

## 2017-01-23 RX ORDER — DOXYCYCLINE 100 MG/1
100 CAPSULE ORAL EVERY 12 HOURS SCHEDULED
Qty: 20 CAPSULE | Refills: 0 | Status: SHIPPED | OUTPATIENT
Start: 2017-01-23 | End: 2017-02-02

## 2017-01-23 RX ADMIN — ENOXAPARIN SODIUM 40 MG: 40 INJECTION SUBCUTANEOUS at 09:51

## 2017-01-23 RX ADMIN — PANTOPRAZOLE SODIUM 40 MG: 40 TABLET, DELAYED RELEASE ORAL at 06:27

## 2017-01-23 RX ADMIN — ATROPA BELLADONNA AND OPIUM 30 MG: 16.2; 3 SUPPOSITORY RECTAL at 09:51

## 2017-01-23 RX ADMIN — VANCOMYCIN HYDROCHLORIDE 1500 MG: 1 INJECTION, POWDER, LYOPHILIZED, FOR SOLUTION INTRAVENOUS at 03:09

## 2017-01-23 RX ADMIN — ATORVASTATIN CALCIUM 20 MG: 20 TABLET, FILM COATED ORAL at 09:51

## 2017-01-26 LAB — BACTERIA SPEC AEROBE CULT: NORMAL

## 2017-03-08 ENCOUNTER — APPOINTMENT (OUTPATIENT)
Dept: GENERAL RADIOLOGY | Facility: HOSPITAL | Age: 77
End: 2017-03-08

## 2017-03-08 ENCOUNTER — HOSPITAL ENCOUNTER (INPATIENT)
Facility: HOSPITAL | Age: 77
LOS: 3 days | Discharge: HOME | End: 2017-03-11
Attending: EMERGENCY MEDICINE | Admitting: HOSPITALIST

## 2017-03-08 ENCOUNTER — APPOINTMENT (OUTPATIENT)
Dept: CT IMAGING | Facility: HOSPITAL | Age: 77
End: 2017-03-08

## 2017-03-08 DIAGNOSIS — J18.9 CAP (COMMUNITY ACQUIRED PNEUMONIA): Primary | ICD-10-CM

## 2017-03-08 PROBLEM — A41.9 SEPSIS (HCC): Status: ACTIVE | Noted: 2017-03-08

## 2017-03-08 LAB
ALBUMIN SERPL-MCNC: 3.8 G/DL (ref 3.5–5.2)
ALBUMIN/GLOB SERPL: 1.5 G/DL
ALP SERPL-CCNC: 53 U/L (ref 39–117)
ALT SERPL W P-5'-P-CCNC: 16 U/L (ref 1–41)
ANION GAP SERPL CALCULATED.3IONS-SCNC: 13.8 MMOL/L
ARTERIAL PATENCY WRIST A: POSITIVE
AST SERPL-CCNC: 20 U/L (ref 1–40)
ATMOSPHERIC PRESS: 754.6 MMHG
B PERT DNA SPEC QL NAA+PROBE: NOT DETECTED
BASE EXCESS BLDA CALC-SCNC: -2.7 MMOL/L (ref 0–2)
BASOPHILS # BLD AUTO: 0.01 10*3/MM3 (ref 0–0.2)
BASOPHILS NFR BLD AUTO: 0 % (ref 0–1.5)
BDY SITE: ABNORMAL
BILIRUB SERPL-MCNC: 1.2 MG/DL (ref 0.1–1.2)
BILIRUB UR QL STRIP: NEGATIVE
BUN BLD-MCNC: 26 MG/DL (ref 8–23)
BUN/CREAT SERPL: 15.6 (ref 7–25)
C PNEUM DNA NPH QL NAA+NON-PROBE: NOT DETECTED
CALCIUM SPEC-SCNC: 8.9 MG/DL (ref 8.6–10.5)
CHLORIDE SERPL-SCNC: 101 MMOL/L (ref 98–107)
CLARITY UR: CLEAR
CO2 SERPL-SCNC: 24.2 MMOL/L (ref 22–29)
COLOR UR: ABNORMAL
CREAT BLD-MCNC: 1.67 MG/DL (ref 0.76–1.27)
D DIMER PPP FEU-MCNC: 0.34 MCGFEU/ML (ref 0–0.49)
D-LACTATE SERPL-SCNC: 2.2 MMOL/L (ref 0.5–2)
D-LACTATE SERPL-SCNC: 3 MMOL/L (ref 0.5–2)
D-LACTATE SERPL-SCNC: 4 MMOL/L (ref 0.5–2)
DEPRECATED RDW RBC AUTO: 44 FL (ref 37–54)
EOSINOPHIL # BLD AUTO: 0 10*3/MM3 (ref 0–0.7)
EOSINOPHIL NFR BLD AUTO: 0 % (ref 0.3–6.2)
ERYTHROCYTE [DISTWIDTH] IN BLOOD BY AUTOMATED COUNT: 13.1 % (ref 11.5–14.5)
FLUAV AG NPH QL: NEGATIVE
FLUAV H1 2009 PAND RNA NPH QL NAA+PROBE: NOT DETECTED
FLUAV H1 HA GENE NPH QL NAA+PROBE: NOT DETECTED
FLUAV H3 RNA NPH QL NAA+PROBE: NOT DETECTED
FLUAV SUBTYP SPEC NAA+PROBE: NOT DETECTED
FLUBV AG NPH QL IA: NEGATIVE
FLUBV RNA ISLT QL NAA+PROBE: NOT DETECTED
GAS FLOW AIRWAY: 5 LPM
GFR SERPL CREATININE-BSD FRML MDRD: 40 ML/MIN/1.73
GLOBULIN UR ELPH-MCNC: 2.6 GM/DL
GLUCOSE BLD-MCNC: 103 MG/DL (ref 65–99)
GLUCOSE BLDC GLUCOMTR-MCNC: 97 MG/DL (ref 70–130)
GLUCOSE UR STRIP-MCNC: NEGATIVE MG/DL
HADV DNA SPEC NAA+PROBE: NOT DETECTED
HCO3 BLDA-SCNC: 21.4 MMOL/L (ref 22–28)
HCOV 229E RNA SPEC QL NAA+PROBE: NOT DETECTED
HCOV HKU1 RNA SPEC QL NAA+PROBE: NOT DETECTED
HCOV NL63 RNA SPEC QL NAA+PROBE: NOT DETECTED
HCOV OC43 RNA SPEC QL NAA+PROBE: NOT DETECTED
HCT VFR BLD AUTO: 42.7 % (ref 40.4–52.2)
HGB BLD-MCNC: 14.5 G/DL (ref 13.7–17.6)
HGB UR QL STRIP.AUTO: NEGATIVE
HMPV RNA NPH QL NAA+NON-PROBE: NOT DETECTED
HPIV1 RNA SPEC QL NAA+PROBE: NOT DETECTED
HPIV2 RNA SPEC QL NAA+PROBE: NOT DETECTED
HPIV3 RNA NPH QL NAA+PROBE: NOT DETECTED
HPIV4 P GENE NPH QL NAA+PROBE: NOT DETECTED
IMM GRANULOCYTES # BLD: 0.21 10*3/MM3 (ref 0–0.03)
IMM GRANULOCYTES NFR BLD: 0.9 % (ref 0–0.5)
KETONES UR QL STRIP: NEGATIVE
L PNEUMO1 AG UR QL IA: NEGATIVE
LEUKOCYTE ESTERASE UR QL STRIP.AUTO: NEGATIVE
LYMPHOCYTES # BLD AUTO: 0.48 10*3/MM3 (ref 0.9–4.8)
LYMPHOCYTES NFR BLD AUTO: 2.2 % (ref 19.6–45.3)
M PNEUMO IGG SER IA-ACNC: NOT DETECTED
MCH RBC QN AUTO: 31.4 PG (ref 27–32.7)
MCHC RBC AUTO-ENTMCNC: 34 G/DL (ref 32.6–36.4)
MCV RBC AUTO: 92.4 FL (ref 79.8–96.2)
MODALITY: ABNORMAL
MONOCYTES # BLD AUTO: 1.87 10*3/MM3 (ref 0.2–1.2)
MONOCYTES NFR BLD AUTO: 8.4 % (ref 5–12)
NEUTROPHILS # BLD AUTO: 19.66 10*3/MM3 (ref 1.9–8.1)
NEUTROPHILS NFR BLD AUTO: 88.5 % (ref 42.7–76)
NITRITE UR QL STRIP: NEGATIVE
PCO2 BLDA: 33.9 MM HG (ref 35–45)
PH BLDA: 7.41 PH UNITS (ref 7.35–7.45)
PH UR STRIP.AUTO: 6 [PH] (ref 5–8)
PLATELET # BLD AUTO: 170 10*3/MM3 (ref 140–500)
PMV BLD AUTO: 10.9 FL (ref 6–12)
PO2 BLDA: 79.1 MM HG (ref 80–100)
POTASSIUM BLD-SCNC: 3.8 MMOL/L (ref 3.5–5.2)
PROCALCITONIN SERPL-MCNC: 15.53 NG/ML (ref 0.1–0.25)
PROT SERPL-MCNC: 6.4 G/DL (ref 6–8.5)
PROT UR QL STRIP: ABNORMAL
RBC # BLD AUTO: 4.62 10*6/MM3 (ref 4.6–6)
RHINOVIRUS RNA SPEC NAA+PROBE: NOT DETECTED
RSV RNA NPH QL NAA+NON-PROBE: NOT DETECTED
S PNEUM AG SPEC QL LA: NEGATIVE
SAO2 % BLDCOA: 95.8 % (ref 92–99)
SET MECH RESP RATE: 20
SODIUM BLD-SCNC: 139 MMOL/L (ref 136–145)
SP GR UR STRIP: 1.02 (ref 1–1.03)
TROPONIN T SERPL-MCNC: <0.01 NG/ML (ref 0–0.03)
UROBILINOGEN UR QL STRIP: ABNORMAL
WBC NRBC COR # BLD: 22.23 10*3/MM3 (ref 4.5–10.7)

## 2017-03-08 PROCEDURE — 87040 BLOOD CULTURE FOR BACTERIA: CPT | Performed by: PHYSICIAN ASSISTANT

## 2017-03-08 PROCEDURE — 94799 UNLISTED PULMONARY SVC/PX: CPT

## 2017-03-08 PROCEDURE — 84145 PROCALCITONIN (PCT): CPT | Performed by: HOSPITALIST

## 2017-03-08 PROCEDURE — 87070 CULTURE OTHR SPECIMN AEROBIC: CPT | Performed by: HOSPITALIST

## 2017-03-08 PROCEDURE — 74176 CT ABD & PELVIS W/O CONTRAST: CPT

## 2017-03-08 PROCEDURE — 71250 CT THORAX DX C-: CPT

## 2017-03-08 PROCEDURE — 36600 WITHDRAWAL OF ARTERIAL BLOOD: CPT

## 2017-03-08 PROCEDURE — 71020 HC CHEST PA AND LATERAL: CPT

## 2017-03-08 PROCEDURE — 87581 M.PNEUMON DNA AMP PROBE: CPT | Performed by: HOSPITALIST

## 2017-03-08 PROCEDURE — 87186 SC STD MICRODIL/AGAR DIL: CPT | Performed by: HOSPITALIST

## 2017-03-08 PROCEDURE — 83605 ASSAY OF LACTIC ACID: CPT | Performed by: PHYSICIAN ASSISTANT

## 2017-03-08 PROCEDURE — 87804 INFLUENZA ASSAY W/OPTIC: CPT | Performed by: PHYSICIAN ASSISTANT

## 2017-03-08 PROCEDURE — 87081 CULTURE SCREEN ONLY: CPT | Performed by: HOSPITALIST

## 2017-03-08 PROCEDURE — 87899 AGENT NOS ASSAY W/OPTIC: CPT | Performed by: INTERNAL MEDICINE

## 2017-03-08 PROCEDURE — 84484 ASSAY OF TROPONIN QUANT: CPT | Performed by: HOSPITALIST

## 2017-03-08 PROCEDURE — 85379 FIBRIN DEGRADATION QUANT: CPT | Performed by: HOSPITALIST

## 2017-03-08 PROCEDURE — 87486 CHLMYD PNEUM DNA AMP PROBE: CPT | Performed by: HOSPITALIST

## 2017-03-08 PROCEDURE — 87633 RESP VIRUS 12-25 TARGETS: CPT | Performed by: HOSPITALIST

## 2017-03-08 PROCEDURE — 93005 ELECTROCARDIOGRAM TRACING: CPT | Performed by: HOSPITALIST

## 2017-03-08 PROCEDURE — 87449 NOS EACH ORGANISM AG IA: CPT | Performed by: INTERNAL MEDICINE

## 2017-03-08 PROCEDURE — 25010000002 HEPARIN (PORCINE) PER 1000 UNITS: Performed by: INTERNAL MEDICINE

## 2017-03-08 PROCEDURE — 83605 ASSAY OF LACTIC ACID: CPT | Performed by: HOSPITALIST

## 2017-03-08 PROCEDURE — 93010 ELECTROCARDIOGRAM REPORT: CPT | Performed by: INTERNAL MEDICINE

## 2017-03-08 PROCEDURE — 87040 BLOOD CULTURE FOR BACTERIA: CPT | Performed by: HOSPITALIST

## 2017-03-08 PROCEDURE — 25010000003 CEFTRIAXONE PER 250 MG: Performed by: PHYSICIAN ASSISTANT

## 2017-03-08 PROCEDURE — 87205 SMEAR GRAM STAIN: CPT | Performed by: HOSPITALIST

## 2017-03-08 PROCEDURE — 94640 AIRWAY INHALATION TREATMENT: CPT

## 2017-03-08 PROCEDURE — 81003 URINALYSIS AUTO W/O SCOPE: CPT | Performed by: PHYSICIAN ASSISTANT

## 2017-03-08 PROCEDURE — 99285 EMERGENCY DEPT VISIT HI MDM: CPT

## 2017-03-08 PROCEDURE — 85025 COMPLETE CBC W/AUTO DIFF WBC: CPT | Performed by: PHYSICIAN ASSISTANT

## 2017-03-08 PROCEDURE — 25010000002 ONDANSETRON PER 1 MG: Performed by: PHYSICIAN ASSISTANT

## 2017-03-08 PROCEDURE — 82803 BLOOD GASES ANY COMBINATION: CPT

## 2017-03-08 PROCEDURE — 87798 DETECT AGENT NOS DNA AMP: CPT | Performed by: HOSPITALIST

## 2017-03-08 PROCEDURE — 80053 COMPREHEN METABOLIC PANEL: CPT | Performed by: PHYSICIAN ASSISTANT

## 2017-03-08 PROCEDURE — 82962 GLUCOSE BLOOD TEST: CPT

## 2017-03-08 RX ORDER — FLUTICASONE PROPIONATE 50 MCG
1 SPRAY, SUSPENSION (ML) NASAL 2 TIMES DAILY
Status: DISCONTINUED | OUTPATIENT
Start: 2017-03-08 | End: 2017-03-11 | Stop reason: HOSPADM

## 2017-03-08 RX ORDER — ACETAMINOPHEN 500 MG
1000 TABLET ORAL ONCE
Status: COMPLETED | OUTPATIENT
Start: 2017-03-08 | End: 2017-03-08

## 2017-03-08 RX ORDER — LEVOTHYROXINE SODIUM 175 UG/1
175 TABLET ORAL
Status: DISCONTINUED | OUTPATIENT
Start: 2017-03-09 | End: 2017-03-11 | Stop reason: HOSPADM

## 2017-03-08 RX ORDER — ATORVASTATIN CALCIUM 10 MG/1
10 TABLET, FILM COATED ORAL DAILY
Status: DISCONTINUED | OUTPATIENT
Start: 2017-03-08 | End: 2017-03-11 | Stop reason: HOSPADM

## 2017-03-08 RX ORDER — HYDROCODONE BITARTRATE AND ACETAMINOPHEN 5; 325 MG/1; MG/1
1 TABLET ORAL EVERY 4 HOURS PRN
Status: DISCONTINUED | OUTPATIENT
Start: 2017-03-08 | End: 2017-03-11 | Stop reason: HOSPADM

## 2017-03-08 RX ORDER — BENZONATATE 100 MG/1
100 CAPSULE ORAL 3 TIMES DAILY
Status: ON HOLD | COMMUNITY
Start: 2016-12-11 | End: 2017-03-08

## 2017-03-08 RX ORDER — ONDANSETRON 2 MG/ML
4 INJECTION INTRAMUSCULAR; INTRAVENOUS ONCE
Status: COMPLETED | OUTPATIENT
Start: 2017-03-08 | End: 2017-03-08

## 2017-03-08 RX ORDER — ACETAMINOPHEN 325 MG/1
650 TABLET ORAL EVERY 4 HOURS PRN
Status: DISCONTINUED | OUTPATIENT
Start: 2017-03-08 | End: 2017-03-11 | Stop reason: HOSPADM

## 2017-03-08 RX ORDER — SODIUM CHLORIDE 9 MG/ML
175 INJECTION, SOLUTION INTRAVENOUS CONTINUOUS
Status: DISCONTINUED | OUTPATIENT
Start: 2017-03-08 | End: 2017-03-10

## 2017-03-08 RX ORDER — SODIUM CHLORIDE 0.9 % (FLUSH) 0.9 %
10 SYRINGE (ML) INJECTION AS NEEDED
Status: DISCONTINUED | OUTPATIENT
Start: 2017-03-08 | End: 2017-03-11 | Stop reason: HOSPADM

## 2017-03-08 RX ORDER — IPRATROPIUM BROMIDE AND ALBUTEROL SULFATE 2.5; .5 MG/3ML; MG/3ML
3 SOLUTION RESPIRATORY (INHALATION)
Status: DISCONTINUED | OUTPATIENT
Start: 2017-03-08 | End: 2017-03-08

## 2017-03-08 RX ORDER — IPRATROPIUM BROMIDE AND ALBUTEROL SULFATE 2.5; .5 MG/3ML; MG/3ML
3 SOLUTION RESPIRATORY (INHALATION)
Status: DISCONTINUED | OUTPATIENT
Start: 2017-03-08 | End: 2017-03-09

## 2017-03-08 RX ORDER — CEFTRIAXONE SODIUM 1 G/50ML
1 INJECTION, SOLUTION INTRAVENOUS ONCE
Status: COMPLETED | OUTPATIENT
Start: 2017-03-08 | End: 2017-03-08

## 2017-03-08 RX ORDER — CEFTRIAXONE SODIUM 2 G/50ML
2 INJECTION, SOLUTION INTRAVENOUS EVERY 24 HOURS
Status: DISCONTINUED | OUTPATIENT
Start: 2017-03-08 | End: 2017-03-11 | Stop reason: HOSPADM

## 2017-03-08 RX ORDER — ONDANSETRON 4 MG/1
4 TABLET, FILM COATED ORAL EVERY 6 HOURS PRN
Status: DISCONTINUED | OUTPATIENT
Start: 2017-03-08 | End: 2017-03-11 | Stop reason: HOSPADM

## 2017-03-08 RX ORDER — LEVOTHYROXINE SODIUM 88 UG/1
0.17 TABLET ORAL
COMMUNITY
End: 2018-06-12

## 2017-03-08 RX ORDER — GUAIFENESIN/DEXTROMETHORPHAN 100-10MG/5
5 SYRUP ORAL EVERY 4 HOURS PRN
Status: DISCONTINUED | OUTPATIENT
Start: 2017-03-08 | End: 2017-03-11 | Stop reason: HOSPADM

## 2017-03-08 RX ORDER — PANTOPRAZOLE SODIUM 40 MG/1
40 TABLET, DELAYED RELEASE ORAL
Status: DISCONTINUED | OUTPATIENT
Start: 2017-03-09 | End: 2017-03-11 | Stop reason: HOSPADM

## 2017-03-08 RX ORDER — HEPARIN SODIUM 5000 [USP'U]/ML
5000 INJECTION, SOLUTION INTRAVENOUS; SUBCUTANEOUS EVERY 12 HOURS SCHEDULED
Status: DISCONTINUED | OUTPATIENT
Start: 2017-03-08 | End: 2017-03-11 | Stop reason: HOSPADM

## 2017-03-08 RX ORDER — SODIUM CHLORIDE 0.9 % (FLUSH) 0.9 %
1-10 SYRINGE (ML) INJECTION AS NEEDED
Status: DISCONTINUED | OUTPATIENT
Start: 2017-03-08 | End: 2017-03-11 | Stop reason: HOSPADM

## 2017-03-08 RX ORDER — SUMATRIPTAN 50 MG/1
50 TABLET, FILM COATED ORAL ONCE
Status: COMPLETED | OUTPATIENT
Start: 2017-03-08 | End: 2017-03-08

## 2017-03-08 RX ADMIN — ATORVASTATIN CALCIUM 10 MG: 10 TABLET, FILM COATED ORAL at 21:33

## 2017-03-08 RX ADMIN — IPRATROPIUM BROMIDE AND ALBUTEROL SULFATE 3 ML: .5; 3 SOLUTION RESPIRATORY (INHALATION) at 14:43

## 2017-03-08 RX ADMIN — SODIUM CHLORIDE 175 ML/HR: 9 INJECTION, SOLUTION INTRAVENOUS at 21:38

## 2017-03-08 RX ADMIN — FLUTICASONE PROPIONATE 1 SPRAY: 50 SPRAY, METERED NASAL at 21:35

## 2017-03-08 RX ADMIN — SODIUM CHLORIDE 175 ML/HR: 9 INJECTION, SOLUTION INTRAVENOUS at 14:24

## 2017-03-08 RX ADMIN — SODIUM CHLORIDE 175 ML/HR: 9 INJECTION, SOLUTION INTRAVENOUS at 15:33

## 2017-03-08 RX ADMIN — AZITHROMYCIN DIHYDRATE 500 MG: 500 INJECTION, POWDER, LYOPHILIZED, FOR SOLUTION INTRAVENOUS at 13:09

## 2017-03-08 RX ADMIN — SODIUM CHLORIDE 1000 ML: 9 INJECTION, SOLUTION INTRAVENOUS at 08:53

## 2017-03-08 RX ADMIN — HYDROCODONE BITARTRATE AND ACETAMINOPHEN 1 TABLET: 5; 325 TABLET ORAL at 14:23

## 2017-03-08 RX ADMIN — HYDROCODONE BITARTRATE AND ACETAMINOPHEN 1 TABLET: 5; 325 TABLET ORAL at 18:30

## 2017-03-08 RX ADMIN — CEFTRIAXONE SODIUM 1 G: 1 INJECTION, SOLUTION INTRAVENOUS at 10:57

## 2017-03-08 RX ADMIN — SUMATRIPTAN SUCCINATE 50 MG: 50 TABLET ORAL at 11:22

## 2017-03-08 RX ADMIN — HEPARIN SODIUM 5000 UNITS: 5000 INJECTION, SOLUTION INTRAVENOUS; SUBCUTANEOUS at 22:12

## 2017-03-08 RX ADMIN — SODIUM CHLORIDE: 9 INJECTION, SOLUTION INTRAVENOUS at 10:56

## 2017-03-08 RX ADMIN — ACETAMINOPHEN 1000 MG: 500 TABLET ORAL at 08:53

## 2017-03-08 RX ADMIN — ONDANSETRON 4 MG: 2 INJECTION INTRAMUSCULAR; INTRAVENOUS at 08:53

## 2017-03-08 NOTE — ED NOTES
One set of blood culture was collected from left arm, second set right arm. Scanner would not work reprinted labels. Will call lab to notify.     Sigrid Hardy RN  03/08/17 4648

## 2017-03-08 NOTE — PLAN OF CARE
Problem: Patient Care Overview (Adult)  Goal: Plan of Care Review  Outcome: Ongoing (interventions implemented as appropriate)    03/08/17 1879   Coping/Psychosocial Response Interventions   Plan Of Care Reviewed With patient;spouse   Patient Care Overview   Progress no change   Outcome Evaluation   Outcome Summary/Follow up Plan Pt arrived from ER today positive for sepsis d/t PNA. pt on abx. pt turned up from RA to 6 liters, pt is back down to 4 liters currently. lactic acid increased from first one drawn. iv fluids increased. ABG pH normal.          Problem: Fall Risk (Adult)  Goal: Identify Related Risk Factors and Signs and Symptoms  Outcome: Ongoing (interventions implemented as appropriate)  Goal: Absence of Falls  Outcome: Ongoing (interventions implemented as appropriate)    Problem: Respiratory Insufficiency (Adult)  Goal: Identify Related Risk Factors and Signs and Symptoms  Outcome: Ongoing (interventions implemented as appropriate)  Goal: Acid/Base Balance  Outcome: Ongoing (interventions implemented as appropriate)  Goal: Effective Ventilation  Outcome: Ongoing (interventions implemented as appropriate)    Problem: Infection, Risk/Actual (Adult)  Goal: Identify Related Risk Factors and Signs and Symptoms  Outcome: Ongoing (interventions implemented as appropriate)  Goal: Infection Prevention/Resolution  Outcome: Ongoing (interventions implemented as appropriate)

## 2017-03-08 NOTE — ED NOTES
Pt c/o fever, vomiting and body aches onset yesterday. Last dose Ibuprofen at 9pm last night     Kalyn Merino RN  03/08/17 0705

## 2017-03-08 NOTE — ED PROVIDER NOTES
The patient presents complaining of nausea and 6-8 episodes of vomiting w/ streaks of red blood over the past 2 days. Pt also c/o around 6 episodes of diarrhea, cough for several days, mild abd discomfort, and headache like he typically experiences around once per month currently. Pt denies CP, SOA, HA different than his usual migraine. He notes recent admission secondary to prostatitis and sepsis.      Limited physical exam:  HENT: Dry oral mucosa  Patient is nontoxic appearing  Lungs/cardiovascular: Clear to auscultation bilaterally, regular rate and rhythm  Abdomen: Mild LLQ abd discomfort without guarding or rebound. Normal bowel sounds  Back/extremities: Unremarkable     XR shows R upper lobe pneumonia, labs show WBC of 22 and Lactic Acid of 2.2. Discussed the plan for admission for further care. Pt understands and agrees with the plan for admit for further testing/treatment, all questions answered.      I supervised care provided by the midlevel provider.  We have discussed this patient's history, physical exam, and treatment plan.  I have reviewed the note and personally saw and examined the patient and agree with the plan of care.    Documentation assistance provided by alirio Cameron for Dr. Springer.  Information recorded by the scribe was done at my direction and has been verified and validated by me.     Kenny Cameron  03/08/17 1016       Toshia Springer MD  03/09/17 0946

## 2017-03-08 NOTE — ED NOTES
"Pt arrived to ER complaining of fever, vomiting and body aches. Pt stated \" I had a fever last night of 102, I been vomiting and my muscles ache all over\". Pt is alert and oriented x4, ambulatory, pt communicates well. Pt coming from home.     Sigrid Hardy RN  03/08/17 0745    "

## 2017-03-08 NOTE — ED PROVIDER NOTES
EMERGENCY DEPARTMENT ENCOUNTER    CHIEF COMPLAINT  Chief Complaint: Fever  History given by: Patient  History limited by: Nothing  Room Number: 03/03  PMD: No Known Provider       HPI:  Pt is a 76 y.o. male who presents to the ED from home with multiple complaints including fever, chills, coughing, diarrhea, sore throat, vomiting, HA and generalized body aches which all began yesterday. The patient reports that his fever was as high as 102 last night.   The patient's last episode of emesis was approximately 04:30 this morning and the last episode of diarrhea was about one hour ago. He states that he's slightly dizzy and lightheaded when he attempts to ambulate secondary to the increased generalized weakness. Pt reports that he received a flu shot this year and he has not had any recent sick contacts.  Patient last took Tylenol 21:00 last night and has no other complaints.     Duration:  2 days  Onset: Gradual  Timing: Constant  Location: Generalized  Quality: Nauseated   Intensity/Severity: Moderate  Progression: Worsened  Associated Symptoms: Fever, chills, cough, diarrhea, sore throat, vomiting, body aches, dizzy, lightheaded, HA  Aggravating Factors: Unknown  Alleviating Factors: None  Previous Episodes: None mentioned  Treatment before arrival: Tylenol 21:00 last night    PAST MEDICAL HISTORY  Active Ambulatory Problems     Diagnosis Date Noted   • Asthma    • Hyperlipidemia    • Lumbago    • Hypothyroidism    • Hx of migraine headaches    • BPH (benign prostatic hyperplasia)    • Pyelonephritis 01/20/2017     Resolved Ambulatory Problems     Diagnosis Date Noted   • No Resolved Ambulatory Problems     No Additional Past Medical History       PAST SURGICAL HISTORY  Past Surgical History   Procedure Laterality Date   • Knee surgery     • Hernia repair     • Thyroidectomy     • Cystoscopy transurethral resection of prostate     • Cystoscopy w/ ureteral stent placement Right 1/20/2017     Procedure: CYSTOSCOPY,  ureteroscopy,  URETERAL STENT INSERTION, RT;  Surgeon: Shashi Arciniega MD;  Location: [facility] OR;  Service:        FAMILY HISTORY  Family History   Problem Relation Age of Onset   • Diabetes Mother    • Heart disease Mother    • Diabetes Father    • Heart disease Father        SOCIAL HISTORY  Social History     Social History   • Marital status:      Spouse name: N/A   • Number of children: N/A   • Years of education: N/A     Occupational History   • Not on file.     Social History Main Topics   • Smoking status: Never Smoker   • Smokeless tobacco: Never Used   • Alcohol use No   • Drug use: No   • Sexual activity: Defer     Other Topics Concern   • Not on file     Social History Narrative       ALLERGIES  Ciprofloxacin and Quinolones    REVIEW OF SYSTEMS  Review of Systems   Constitutional: Positive for chills and fever.   HENT: Positive for sore throat. Negative for congestion and rhinorrhea.    Eyes: Negative for pain.   Respiratory: Positive for cough. Negative for shortness of breath and wheezing.    Cardiovascular: Negative for chest pain, palpitations and leg swelling.   Gastrointestinal: Positive for diarrhea, nausea and vomiting. Negative for abdominal pain.   Genitourinary: Negative for difficulty urinating, dysuria, flank pain and frequency.   Musculoskeletal: Negative for arthralgias, myalgias, neck pain and neck stiffness.   Skin: Negative for rash.   Neurological: Positive for dizziness, light-headedness and headaches. Negative for speech difficulty, weakness and numbness.   Psychiatric/Behavioral: Negative.    All other systems reviewed and are negative.      PHYSICAL EXAM  ED Triage Vitals   Temp Heart Rate Resp BP SpO2   03/08/17 0733 03/08/17 0733 03/08/17 0733 03/08/17 0743 03/08/17 0733   100.4 °F (38 °C) 122 18 124/69 94 %      Temp src Heart Rate Source Patient Position BP Location FiO2 (%)   03/08/17 0733 03/08/17 0733 03/08/17 0743 03/08/17 0743 --   Tympanic Monitor Lying  Right arm        Physical Exam   Constitutional: He is oriented to person, place, and time and well-developed, well-nourished, and in no distress.   HENT:   Head: Normocephalic and atraumatic.   Eyes: EOM are normal. Pupils are equal, round, and reactive to light.   Neck: Normal range of motion. Neck supple.   Cardiovascular: Normal rate, regular rhythm and normal heart sounds.    Pulmonary/Chest: Effort normal and breath sounds normal. No respiratory distress.   Abdominal: Soft. There is tenderness in the suprapubic area and left lower quadrant. There is no rebound and no guarding.   Musculoskeletal: Normal range of motion. He exhibits no edema.   Neurological: He is alert and oriented to person, place, and time. He has normal sensation and normal strength.   Skin: Skin is warm and dry.   Psychiatric: Mood and affect normal.   Nursing note and vitals reviewed.      LAB RESULTS  Lab Results (last 24 hours)     Procedure Component Value Units Date/Time    CBC & Differential [[id number]] Collected:  03/08/17 0852    Specimen:  Blood Updated:  03/08/17 0941    Narrative:       The following orders were created for panel order CBC & Differential.  Procedure                               Abnormality         Status                     ---------                               -----------         ------                     CBC Auto Differential[[id number]]         Abnormal            Final result                 Please view results for these tests on the individual orders.    Comprehensive Metabolic Panel [[id number]]  (Abnormal) Collected:  03/08/17 0852    Specimen:  Blood Updated:  03/08/17 0928     Glucose 103 (H) mg/dL      BUN 26 (H) mg/dL      Creatinine 1.67 (H) mg/dL      Sodium 139 mmol/L      Potassium 3.8 mmol/L      Chloride 101 mmol/L      CO2 24.2 mmol/L      Calcium 8.9 mg/dL      Total Protein 6.4 g/dL      Albumin 3.80 g/dL      ALT (SGPT) 16 U/L      AST (SGOT) 20 U/L      Alkaline Phosphatase 53 U/L      Total  Bilirubin 1.2 mg/dL      eGFR Non African Amer 40 (L) mL/min/1.73      Globulin 2.6 gm/dL      A/G Ratio 1.5 g/dL      BUN/Creatinine Ratio 15.6      Anion Gap 13.8 mmol/L     Narrative:       The MDRD GFR formula is only valid for adults with stable renal function between ages 18 and 70.    Lactic Acid, Plasma [[id number]]  (Abnormal) Collected:  03/08/17 0852    Specimen:  Blood Updated:  03/08/17 0927     Lactate 2.2 (C) mmol/L     CBC Auto Differential [[id number]]  (Abnormal) Collected:  03/08/17 0852    Specimen:  Blood Updated:  03/08/17 0941     WBC 22.23 (H) 10*3/mm3      RBC 4.62 10*6/mm3      Hemoglobin 14.5 g/dL      Hematocrit 42.7 %      MCV 92.4 fL      MCH 31.4 pg      MCHC 34.0 g/dL      RDW 13.1 %      RDW-SD 44.0 fl      MPV 10.9 fL      Platelets 170 10*3/mm3      Neutrophil % 88.5 (H) %      Lymphocyte % 2.2 (L) %      Monocyte % 8.4 %      Eosinophil % 0.0 (L) %      Basophil % 0.0 %      Immature Grans % 0.9 (H) %      Neutrophils, Absolute 19.66 (H) 10*3/mm3      Lymphocytes, Absolute 0.48 (L) 10*3/mm3      Monocytes, Absolute 1.87 (H) 10*3/mm3      Eosinophils, Absolute 0.00 10*3/mm3      Basophils, Absolute 0.01 10*3/mm3      Immature Grans, Absolute 0.21 (H) 10*3/mm3     Influenza Antigen [[id number]]  (Normal) Collected:  03/08/17 0909    Specimen:  Swab from Nasopharynx Updated:  03/08/17 0937     Influenza A Ag, EIA Negative      Influenza B Ag, EIA Negative     Urinalysis With / Culture If Indicated [[id number]]  (Abnormal) Collected:  03/08/17 0949    Specimen:  Urine from Urine, Clean Catch Updated:  03/08/17 1002     Color, UA Dark Yellow (A)      Appearance, UA Clear      pH, UA 6.0      Specific Gravity, UA 1.019      Glucose, UA Negative      Ketones, UA Negative      Bilirubin, UA Negative      Blood, UA Negative      Protein, UA Trace (A)      Leuk Esterase, UA Negative      Nitrite, UA Negative      Urobilinogen, UA 0.2 E.U./dL     Narrative:       Urine microscopic not indicated.     Blood Culture [[id number]] Collected:  03/08/17 1041    Specimen:  Blood from Arm, Left Updated:  03/08/17 1053    Blood Culture [[id number]] Collected:  03/08/17 1048    Specimen:  Blood from Arm, Left Updated:  03/08/17 1053          I ordered the above labs and reviewed the results    RADIOLOGY  XR Chest 2 View   Final Result      CT Chest Without Contrast    (Results Pending)   CT Abdomen Pelvis Without Contrast    (Results Pending)      09:55  Reviewed CXR - Acute right upper lobe pneumonia. Recommend follow-up imaging to confirm complete resolution. Independently viewed by me. Interpreted by radiologist.     I ordered the above noted radiological studies. Interpreted by radiologist. Reviewed by me in PACS.       PROCEDURES  Procedures      PROGRESS AND CONSULTS  ED Course     08:25  Ordered Labs and CXR for further evaluation. Also ordered Tylenol for the patient's fever and Zofran for nausea. IVF for hydration was also ordered     09:54  Discussed case with  and she agrees with the treatment plan.     09:56  Rechecked patient and they are resting. /58 and 's. Discussed pertinent lab and imaging results, including CXR shows an acute RLL pneumonia and his Lactic Acid level is also elevated. Discussed the plan to admit the patient for antibiotic treatment and evaluation. Patient agrees with plan and all questions were addressed.     10:20  Discussed case with Dr Bonilla. I explained that the patient has LLQ abdominal pain and he would like me to perform a CT Chest and CT abd/pel for further evaluation. Reviewed history, exam, results and treatments.  Discussed concerns and plan of care. Dr Bonilla accepts pt to be admitted.        MEDICAL DECISION MAKING  Results were reviewed/discussed with the patient and they were also made aware of online access. Pt also made aware that some labs, such as cultures, will not be resulted during ER visit and follow up with PMD is necessary.     MDM  Number of  Diagnoses or Management Options     Amount and/or Complexity of Data Reviewed  Clinical lab tests: ordered and reviewed  Tests in the radiology section of CPT®: ordered and reviewed (CXR - RLL pneumonia )  Decide to obtain previous medical records or to obtain history from someone other than the patient: yes  Review and summarize past medical records: yes (Last admitted 1/20/17 for pyelonephritis )           DIAGNOSIS  Final diagnoses:   CAP (community acquired pneumonia)       DISPOSITION  ADMISSION    Discussed treatment plan and reason for admission with pt/family and admitting physician.  Pt/family voiced understanding of the plan for admission for further testing/treatment as needed.       Latest Documented Vital Signs:  As of 10:29 AM  BP- 96/58 HR- 99 Temp- 100.4 °F (38 °C) (Tympanic) O2 sat- 94%    --  Documentation assistance provided by alirio Hilton for Reed Pelayo PA-C.  Information recorded by the scribe was done at my direction and has been verified and validated by me.          Aleks Hilton  03/08/17 1203       LUIS DANIEL Martinez  03/08/17 5149

## 2017-03-08 NOTE — CONSULTS
Patient Identification:  Asa Magaña  76 y.o.  male  1940  [id number]          LOS 0    Requesting physician: Dr Bonilla    Reason for Consultation:  Sepsis with PNA    History of Present Illness:   Thank you for this pulmonary consult.  76-year-old male presents with nausea and vomiting with diarrhea for the past several days has had increased cough and shortness of breath the last 48 hours.  Received admission with prostatitis.  Has infiltrate on x-ray predominantly right upper lobe.  Cough is described as moderate in nature.  Shortness of breath worse with activity.  Diarrhea is been going on for several weeks.  Febrile to 101.8 emergency room and tachycardic with hypotension.  Elevated lactic acid level noted.    Past Medical History:  Past Medical History   Diagnosis Date   • Asthma    • BPH (benign prostatic hyperplasia)    • Hx of migraine headaches    • Hyperlipidemia    • Hypothyroidism    • Lumbago        Past Surgical History:  Past Surgical History   Procedure Laterality Date   • Knee surgery     • Hernia repair     • Thyroidectomy     • Cystoscopy transurethral resection of prostate     • Cystoscopy w/ ureteral stent placement Right 1/20/2017     Procedure: CYSTOSCOPY, ureteroscopy,  URETERAL STENT INSERTION, RT;  Surgeon: Shashi Arciniega MD;  Location: [facility];  Service:         Home Meds:  Facility-Administered Medications Prior to Admission   Medication Dose Route Frequency Provider Last Rate Last Dose   • albuterol (ACCUNEB) nebulizer solution 2.5 mg  2.5 mg Nebulization Q6H PRN YOANDY Santiago         Prescriptions Prior to Admission   Medication Sig Dispense Refill Last Dose   • albuterol (PROVENTIL HFA;VENTOLIN HFA) 108 (90 BASE) MCG/ACT inhaler Inhale 2 puffs every 4 (four) hours as needed for wheezing.   Past Week at Unknown time   • benzonatate (TESSALON PERLES) 100 MG capsule Take 100 mg by mouth 3 (Three) Times a Day.   3/7/2017 at Unknown time   • budesonide  (PULMICORT) 180 MCG/ACT inhaler Inhale 1 puff 2 (two) times a day.   Past Month at Unknown time   • dextromethorphan-guaifenesin (MUCINEX DM MAX STRENGTH)  MG per 12 hr tablet Take 1 tablet by mouth.   Past Week at Unknown time   • fluticasone (FLONASE) 50 MCG/ACT nasal spray 1 spray into each nostril Daily As Needed.   3/7/2017 at Unknown time   • HYDROcodone-acetaminophen (NORCO) 5-325 MG per tablet Take 1 tablet by mouth Every 6 (Six) Hours As Needed.   Past Month at Unknown time   • lansoprazole (PREVACID) 30 MG capsule Take 30 mg by mouth Daily.   3/7/2017 at Unknown time   • levothyroxine (SYNTHROID, LEVOTHROID) 88 MCG tablet Take 0.175 mcg by mouth.   3/7/2017 at Unknown time   • simvastatin (ZOCOR) 20 MG tablet Take 20 mg by mouth Every Night.   3/7/2017 at Unknown time   • SUMAtriptan (IMITREX) 50 MG tablet Take 50 mg by mouth 4 (Four) Times a Day As Needed.   Past Month at Unknown time   • calcium citrate-vitamin d (CALCITRATE) 315-250 MG-UNIT tablet tablet Take  by mouth.   Taking   • sodium chloride (OCEAN NASAL SPRAY) 0.65 % nasal spray 1 spray into each nostril.   More than a month at Unknown time         Allergies:  Allergies   Allergen Reactions   • Ciprofloxacin Hives and Palpitations   • Quinolones Hives and Palpitations       Social History:   Social History     Social History   • Marital status:      Spouse name: N/A   • Number of children: N/A   • Years of education: N/A     Occupational History   • Not on file.     Social History Main Topics   • Smoking status: Never Smoker   • Smokeless tobacco: Never Used   • Alcohol use No   • Drug use: No   • Sexual activity: Defer     Other Topics Concern   • Not on file     Social History Narrative       Family History:  Family History   Problem Relation Age of Onset   • Diabetes Mother    • Heart disease Mother    • Diabetes Father    • Heart disease Father        Review of Systems:  Denies  chills  Denies nausea or vomiting  No new vision  "or hearing changes  No chest pain  +cough and soa  + diarrhea   No musculoskeletal complaints  No heat or cold intolerance  No skin rashes  No dizziness or confusion.  No seizure activity  No new anxiety or depression  12 system review of systems performed and all else negative    Objective:    PHYSICAL EXAM:    Visit Vitals   • /59 (BP Location: Left arm, Patient Position: Lying)   • Pulse 110   • Temp 98.3 °F (36.8 °C) (Oral)   • Resp 18   • Ht 72\" (182.9 cm)   • Wt 204 lb 11.2 oz (92.9 kg)   • SpO2 91%   • BMI 27.76 kg/m2    Body mass index is 27.76 kg/(m^2). 91% 204 lb 11.2 oz (92.9 kg)    GENERAL APPEARANCE:   · Well developed  · Well nourished  · No acute distress   EYES:    · PERRL                                                                           · Conjunctiva normal  · Sclera non-icteric.  HENT:   · Atraumatic, normocephalic  · External ears and nose normal  · Moist mucus membranes and no ulcers  NECK:  · Thyroid not enlarged  · Trachea midline   RESPIRATORY:    · Nonlabored breathing   · Coarse right side breath sounds  · No rales. + wheezing  · No dullness  CARDIOVASCULAR:    · Tachycardic  · Normal S1, S2  · No murmur  · Lower extremity edema: none    GI:   · Bowel sounds normal  · Abdomen soft , non-distended, non-tender  · No abdominal masses.    MUSCULOSKELETAL:  · Normal movement of extremities  · No tenderness, no deformities  · No clubbing or cyanosis   Skin:    · No visible rashes  · No palpable nodules  PSYCHIATRIC:  · Speech and behavior appropriate  · Normal mood and affect  · Oriented to person, place and time  NEUROLOGIC:      Lab Review:      Lab Results   Component Value Date    WBC 22.23 (H) 03/08/2017    HGB 14.5 03/08/2017    HCT 42.7 03/08/2017    MCV 92.4 03/08/2017     03/08/2017            Lab Results   Component Value Date    CREATININE 1.67 (H) 03/08/2017    BUN 26 (H) 03/08/2017     03/08/2017    K 3.8 03/08/2017     03/08/2017    CO2 24.2 " 03/08/2017      No results found for: CKTOTAL, CKMB, CKMBINDEX, TROPONINI, TROPONINT    Results from last 7 days  Lab Units 03/08/17  1553   PH, ARTERIAL pH units 7.408   PO2 ART mm Hg 79.1*   PCO2, ARTERIAL mm Hg 33.9*   HCO3 ART mmol/L 21.4*              Imaging reviewed  chest X-ray and CT chest viewed: Shows dense perihilar infiltrate right upper lobe       Assessment:  Sepsis with shock/hypotension  Right upper pneumonia  COPD Exacerbation  Diarrhea  Acute hypoxemic respiratory failure secondary to pneumonia  Lactic acidosis  Acute kidney injury    Recommendations:  Agree with antibiotics.  Continue scheduled bronchodilators to help with clearance and COPD.  Repeat lactic acid level.  IV fluid for resuscitation.  Check sputum culture, RVP and legionella/pneumococcal urinary Ag.      Thank you for allowing me to participate in the care of this patient.  I will continue to follow along with you.      Adam Winston MD  [facility], [facility]  Pulmonary and Critical Care Medicine    3/8/2017  4:04 PM    EMR Dragon/Transcription disclaimer:     Much of this encounter note is an electronic transcription/translation of spoken language to printed text. The electronic translation of spoken language may permit erroneous, or at times, nonsensical words or phrases to be inadvertently transcribed; Although I have reviewed the note for such errors, some may still exist.

## 2017-03-08 NOTE — ED NOTES
Report called and given to Samantha OSULLIVAN on [id number], updated on pt current status, discussed admitting diagnosis and completed ER order. RN had no questions at this time. Pt had no questions at this time. Family updated.     Sigrid Hardy RN  03/08/17 5194

## 2017-03-09 ENCOUNTER — APPOINTMENT (OUTPATIENT)
Dept: CARDIOLOGY | Facility: HOSPITAL | Age: 77
End: 2017-03-09
Attending: INTERNAL MEDICINE

## 2017-03-09 LAB
ALBUMIN SERPL-MCNC: 2.2 G/DL (ref 3.5–5.2)
ALBUMIN/GLOB SERPL: 0.8 G/DL
ALP SERPL-CCNC: 33 U/L (ref 39–117)
ALT SERPL W P-5'-P-CCNC: 12 U/L (ref 1–41)
ANION GAP SERPL CALCULATED.3IONS-SCNC: 11.7 MMOL/L
AST SERPL-CCNC: 17 U/L (ref 1–40)
BASOPHILS # BLD AUTO: 0.01 10*3/MM3 (ref 0–0.2)
BASOPHILS NFR BLD AUTO: 0.1 % (ref 0–1.5)
BH CV LOWER VASCULAR LEFT COMMON FEMORAL AUGMENT: NORMAL
BH CV LOWER VASCULAR LEFT COMMON FEMORAL COMPETENT: NORMAL
BH CV LOWER VASCULAR LEFT COMMON FEMORAL COMPRESS: NORMAL
BH CV LOWER VASCULAR LEFT COMMON FEMORAL PHASIC: NORMAL
BH CV LOWER VASCULAR LEFT COMMON FEMORAL SPONT: NORMAL
BH CV LOWER VASCULAR LEFT DISTAL FEMORAL COMPRESS: NORMAL
BH CV LOWER VASCULAR LEFT GASTRONEMIUS COMPRESS: NORMAL
BH CV LOWER VASCULAR LEFT GREATER SAPH AK COMPRESS: NORMAL
BH CV LOWER VASCULAR LEFT GREATER SAPH BK COMPRESS: NORMAL
BH CV LOWER VASCULAR LEFT MID FEMORAL AUGMENT: NORMAL
BH CV LOWER VASCULAR LEFT MID FEMORAL COMPETENT: NORMAL
BH CV LOWER VASCULAR LEFT MID FEMORAL COMPRESS: NORMAL
BH CV LOWER VASCULAR LEFT MID FEMORAL PHASIC: NORMAL
BH CV LOWER VASCULAR LEFT MID FEMORAL SPONT: NORMAL
BH CV LOWER VASCULAR LEFT PERONEAL COMPRESS: NORMAL
BH CV LOWER VASCULAR LEFT POPLITEAL AUGMENT: NORMAL
BH CV LOWER VASCULAR LEFT POPLITEAL COMPETENT: NORMAL
BH CV LOWER VASCULAR LEFT POPLITEAL COMPRESS: NORMAL
BH CV LOWER VASCULAR LEFT POPLITEAL PHASIC: NORMAL
BH CV LOWER VASCULAR LEFT POPLITEAL SPONT: NORMAL
BH CV LOWER VASCULAR LEFT POSTERIOR TIBIAL COMPRESS: NORMAL
BH CV LOWER VASCULAR LEFT PROXIMAL FEMORAL COMPRESS: NORMAL
BH CV LOWER VASCULAR LEFT SAPHENOFEMORAL JUNCTION AUGMENT: NORMAL
BH CV LOWER VASCULAR LEFT SAPHENOFEMORAL JUNCTION COMPRESS: NORMAL
BH CV LOWER VASCULAR LEFT SAPHENOFEMORAL JUNCTION PHASIC: NORMAL
BH CV LOWER VASCULAR LEFT SAPHENOFEMORAL JUNCTION SPONT: NORMAL
BH CV LOWER VASCULAR RIGHT COMMON FEMORAL AUGMENT: NORMAL
BH CV LOWER VASCULAR RIGHT COMMON FEMORAL COMPETENT: NORMAL
BH CV LOWER VASCULAR RIGHT COMMON FEMORAL COMPRESS: NORMAL
BH CV LOWER VASCULAR RIGHT COMMON FEMORAL PHASIC: NORMAL
BH CV LOWER VASCULAR RIGHT COMMON FEMORAL SPONT: NORMAL
BH CV LOWER VASCULAR RIGHT DISTAL FEMORAL COMPRESS: NORMAL
BH CV LOWER VASCULAR RIGHT GASTRONEMIUS COMPRESS: NORMAL
BH CV LOWER VASCULAR RIGHT GREATER SAPH AK COMPRESS: NORMAL
BH CV LOWER VASCULAR RIGHT GREATER SAPH BK COMPRESS: NORMAL
BH CV LOWER VASCULAR RIGHT MID FEMORAL AUGMENT: NORMAL
BH CV LOWER VASCULAR RIGHT MID FEMORAL COMPETENT: NORMAL
BH CV LOWER VASCULAR RIGHT MID FEMORAL COMPRESS: NORMAL
BH CV LOWER VASCULAR RIGHT MID FEMORAL PHASIC: NORMAL
BH CV LOWER VASCULAR RIGHT MID FEMORAL SPONT: NORMAL
BH CV LOWER VASCULAR RIGHT PERONEAL COMPRESS: NORMAL
BH CV LOWER VASCULAR RIGHT POPLITEAL AUGMENT: NORMAL
BH CV LOWER VASCULAR RIGHT POPLITEAL COMPETENT: NORMAL
BH CV LOWER VASCULAR RIGHT POPLITEAL COMPRESS: NORMAL
BH CV LOWER VASCULAR RIGHT POPLITEAL PHASIC: NORMAL
BH CV LOWER VASCULAR RIGHT POPLITEAL SPONT: NORMAL
BH CV LOWER VASCULAR RIGHT POSTERIOR TIBIAL COMPRESS: NORMAL
BH CV LOWER VASCULAR RIGHT PROXIMAL FEMORAL COMPRESS: NORMAL
BH CV LOWER VASCULAR RIGHT SAPHENOFEMORAL JUNCTION AUGMENT: NORMAL
BH CV LOWER VASCULAR RIGHT SAPHENOFEMORAL JUNCTION COMPRESS: NORMAL
BH CV LOWER VASCULAR RIGHT SAPHENOFEMORAL JUNCTION PHASIC: NORMAL
BH CV LOWER VASCULAR RIGHT SAPHENOFEMORAL JUNCTION SPONT: NORMAL
BILIRUB SERPL-MCNC: 0.6 MG/DL (ref 0.1–1.2)
BUN BLD-MCNC: 25 MG/DL (ref 8–23)
BUN/CREAT SERPL: 19.4 (ref 7–25)
CALCIUM SPEC-SCNC: 7.1 MG/DL (ref 8.6–10.5)
CHLORIDE SERPL-SCNC: 106 MMOL/L (ref 98–107)
CO2 SERPL-SCNC: 21.3 MMOL/L (ref 22–29)
CREAT BLD-MCNC: 1.29 MG/DL (ref 0.76–1.27)
D-LACTATE SERPL-SCNC: 2.1 MMOL/L (ref 0.5–2)
D-LACTATE SERPL-SCNC: 2.3 MMOL/L (ref 0.5–2)
D-LACTATE SERPL-SCNC: 2.8 MMOL/L (ref 0.5–2)
DEPRECATED RDW RBC AUTO: 46.8 FL (ref 37–54)
DOHLE BODIES: PRESENT
EOSINOPHIL # BLD AUTO: 0 10*3/MM3 (ref 0–0.7)
EOSINOPHIL NFR BLD AUTO: 0 % (ref 0.3–6.2)
ERYTHROCYTE [DISTWIDTH] IN BLOOD BY AUTOMATED COUNT: 13.8 % (ref 11.5–14.5)
GFR SERPL CREATININE-BSD FRML MDRD: 54 ML/MIN/1.73
GLOBULIN UR ELPH-MCNC: 2.6 GM/DL
GLUCOSE BLD-MCNC: 112 MG/DL (ref 65–99)
HCT VFR BLD AUTO: 34.4 % (ref 40.4–52.2)
HGB BLD-MCNC: 11.3 G/DL (ref 13.7–17.6)
IMM GRANULOCYTES # BLD: 0.35 10*3/MM3 (ref 0–0.03)
IMM GRANULOCYTES NFR BLD: 2.1 % (ref 0–0.5)
LYMPHOCYTES # BLD AUTO: 0.64 10*3/MM3 (ref 0.9–4.8)
LYMPHOCYTES NFR BLD AUTO: 3.9 % (ref 19.6–45.3)
MCH RBC QN AUTO: 30.5 PG (ref 27–32.7)
MCHC RBC AUTO-ENTMCNC: 32.8 G/DL (ref 32.6–36.4)
MCV RBC AUTO: 93 FL (ref 79.8–96.2)
MONOCYTES # BLD AUTO: 0.57 10*3/MM3 (ref 0.2–1.2)
MONOCYTES NFR BLD AUTO: 3.5 % (ref 5–12)
NEUTROPHILS # BLD AUTO: 14.79 10*3/MM3 (ref 1.9–8.1)
NEUTROPHILS NFR BLD AUTO: 90.4 % (ref 42.7–76)
NRBC BLD MANUAL-RTO: 0 /100 WBC (ref 0–0)
PLAT MORPH BLD: NORMAL
PLATELET # BLD AUTO: 139 10*3/MM3 (ref 140–500)
PMV BLD AUTO: 10.8 FL (ref 6–12)
POTASSIUM BLD-SCNC: 4 MMOL/L (ref 3.5–5.2)
PROT SERPL-MCNC: 4.8 G/DL (ref 6–8.5)
RBC # BLD AUTO: 3.7 10*6/MM3 (ref 4.6–6)
RBC MORPH BLD: NORMAL
SODIUM BLD-SCNC: 139 MMOL/L (ref 136–145)
TOXIC GRANULATION: NORMAL
WBC NRBC COR # BLD: 16.36 10*3/MM3 (ref 4.5–10.7)

## 2017-03-09 PROCEDURE — 83605 ASSAY OF LACTIC ACID: CPT | Performed by: INTERNAL MEDICINE

## 2017-03-09 PROCEDURE — 85025 COMPLETE CBC W/AUTO DIFF WBC: CPT | Performed by: HOSPITALIST

## 2017-03-09 PROCEDURE — 25010000002 HEPARIN (PORCINE) PER 1000 UNITS: Performed by: INTERNAL MEDICINE

## 2017-03-09 PROCEDURE — 25010000003 CEFTRIAXONE PER 250 MG: Performed by: HOSPITALIST

## 2017-03-09 PROCEDURE — 80053 COMPREHEN METABOLIC PANEL: CPT | Performed by: HOSPITALIST

## 2017-03-09 PROCEDURE — 93970 EXTREMITY STUDY: CPT

## 2017-03-09 PROCEDURE — 85007 BL SMEAR W/DIFF WBC COUNT: CPT | Performed by: HOSPITALIST

## 2017-03-09 RX ORDER — IPRATROPIUM BROMIDE AND ALBUTEROL SULFATE 2.5; .5 MG/3ML; MG/3ML
3 SOLUTION RESPIRATORY (INHALATION) EVERY 4 HOURS PRN
Status: DISCONTINUED | OUTPATIENT
Start: 2017-03-09 | End: 2017-03-11 | Stop reason: HOSPADM

## 2017-03-09 RX ADMIN — LEVOTHYROXINE SODIUM 175 MCG: 175 TABLET ORAL at 06:23

## 2017-03-09 RX ADMIN — HEPARIN SODIUM 5000 UNITS: 5000 INJECTION, SOLUTION INTRAVENOUS; SUBCUTANEOUS at 08:20

## 2017-03-09 RX ADMIN — FLUTICASONE PROPIONATE 1 SPRAY: 50 SPRAY, METERED NASAL at 18:37

## 2017-03-09 RX ADMIN — PANTOPRAZOLE SODIUM 40 MG: 40 TABLET, DELAYED RELEASE ORAL at 06:23

## 2017-03-09 RX ADMIN — HYDROCODONE BITARTRATE AND ACETAMINOPHEN 1 TABLET: 5; 325 TABLET ORAL at 02:49

## 2017-03-09 RX ADMIN — SODIUM CHLORIDE 175 ML/HR: 9 INJECTION, SOLUTION INTRAVENOUS at 17:57

## 2017-03-09 RX ADMIN — HEPARIN SODIUM 5000 UNITS: 5000 INJECTION, SOLUTION INTRAVENOUS; SUBCUTANEOUS at 21:15

## 2017-03-09 RX ADMIN — SODIUM CHLORIDE 175 ML/HR: 9 INJECTION, SOLUTION INTRAVENOUS at 05:16

## 2017-03-09 RX ADMIN — SODIUM CHLORIDE 175 ML/HR: 9 INJECTION, SOLUTION INTRAVENOUS at 10:17

## 2017-03-09 RX ADMIN — FLUTICASONE PROPIONATE 1 SPRAY: 50 SPRAY, METERED NASAL at 08:20

## 2017-03-09 RX ADMIN — CEFTRIAXONE SODIUM 2 G: 2 INJECTION, SOLUTION INTRAVENOUS at 10:23

## 2017-03-09 RX ADMIN — HYDROCODONE BITARTRATE AND ACETAMINOPHEN 1 TABLET: 5; 325 TABLET ORAL at 12:08

## 2017-03-09 RX ADMIN — AZITHROMYCIN DIHYDRATE 500 MG: 500 INJECTION, POWDER, LYOPHILIZED, FOR SOLUTION INTRAVENOUS at 13:14

## 2017-03-09 RX ADMIN — ATORVASTATIN CALCIUM 10 MG: 10 TABLET, FILM COATED ORAL at 08:20

## 2017-03-09 NOTE — PROGRESS NOTES
[facility]     LOS: 1 day     Subjective:  No cp  No soa  In ICU this am     Objective:    Vital Signs:  Temp:  [98 °F (36.7 °C)-98.3 °F (36.8 °C)] 98 °F (36.7 °C)  Heart Rate:  [] 95  Resp:  [18-20] 18  BP: ()/(52-92) 111/68    General Appearance: no acute distress, appears comfortable  Heart: regular rate and rhythm  Lungs: rhonchi and wheezes on the right  Abdomen: soft, non-tender, no guarding, no rebound, non-distended  Extremeties: no edema, no cyanosis  Neurology: speech normal, mental status normal  Mental Status: alert, oriented    Results Review:    GLUCOSE   Date Value Ref Range Status   03/09/2017 112 (H) 65 - 99 mg/dL Final   03/08/2017 103 (H) 65 - 99 mg/dL Final       Results from last 7 days  Lab Units 03/09/17  0605   WBC 10*3/mm3 16.36*   HEMOGLOBIN g/dL 11.3*   HEMATOCRIT % 34.4*   PLATELETS 10*3/mm3 139*       Results from last 7 days  Lab Units 03/09/17  0605   SODIUM mmol/L 139   POTASSIUM mmol/L 4.0   CHLORIDE mmol/L 106   TOTAL CO2 mmol/L 21.3*   BUN mg/dL 25*   CREATININE mg/dL 1.29*   CALCIUM mg/dL 7.1*   BILIRUBIN mg/dL 0.6   ALK PHOS U/L 33*   ALT (SGPT) U/L 12   AST (SGOT) U/L 17   GLUCOSE mg/dL 112*                 Results from last 7 days  Lab Units 03/08/17  1854   TROPONIN T ng/mL <0.010       I have reviewed daily medications and changes in CPOE    Scheduled meds    atorvastatin 10 mg Oral Daily   ceftriaxone 2 g Intravenous Q24H   And      azithromycin 500 mg Intravenous Q24H   fluticasone 1 spray Each Nare BID   heparin (porcine) 5,000 Units Subcutaneous Q12H   levothyroxine 175 mcg Oral Q AM   pantoprazole 40 mg Oral Q AM         sodium chloride 175 mL/hr Last Rate: 175 mL/hr (03/09/17 1017)     PRN meds  •  acetaminophen  •  guaifenesin-dextromethorphan  •  HYDROcodone-acetaminophen  •  ipratropium-albuterol  •  ondansetron  •  sodium chloride  •  Insert peripheral IV **AND** sodium chloride      Doppler is  negative    Assessment:    Active Problems:    CAP (community acquired pneumonia)    Asthma    Hyperlipidemia    Lumbago    Hypothyroidism    Hx of migraine headaches    BPH (benign prostatic hyperplasia)   recent Pyelonephritis    Sepsis  arf    Plan:  Lactic acid is better    ekg was ok      troponins ok    Bp is better    nebs    Nasal steroids    Renal failure is better      Justin Bonilla MD  03/09/17  5:21 PM

## 2017-03-09 NOTE — PLAN OF CARE
Problem: Patient Care Overview (Adult)  Goal: Plan of Care Review    03/09/17 0740   Outcome Evaluation   Outcome Summary/Follow up Plan Pt vitals remain stable. Blood pressure MAP remained greater than 65. Pt remains on 02 at 4l NC.         Problem: Fall Risk (Adult)  Goal: Identify Related Risk Factors and Signs and Symptoms  Outcome: Ongoing (interventions implemented as appropriate)  Goal: Absence of Falls  Outcome: Ongoing (interventions implemented as appropriate)    Problem: Respiratory Insufficiency (Adult)  Goal: Acid/Base Balance  Outcome: Ongoing (interventions implemented as appropriate)  Goal: Effective Ventilation  Outcome: Ongoing (interventions implemented as appropriate)    Problem: Infection, Risk/Actual (Adult)  Goal: Identify Related Risk Factors and Signs and Symptoms  Outcome: Ongoing (interventions implemented as appropriate)  Goal: Infection Prevention/Resolution  Outcome: Ongoing (interventions implemented as appropriate)

## 2017-03-09 NOTE — H&P
CHIEF COMPLAINT:  Nausea, vomiting.     HISTORY OF PRESENT ILLNESS:  This is a 76-year-old gentleman who comes to the hospital because of nausea/vomiting which have been going on for about the last 12 hours. The patient in the emergency room is found to have severe cough and fevers, along with an elevated lactic acid level and a white blood cell count of 22,000, and x-ray chest in the emergency room shows right upper lobe pneumonia. The patient says really has not had any cold-like symptoms prior to this. He says maybe he has been feeling a little bit fatigued over the last couple weeks, but really started to have problems with the vomiting/nausea last night. The patient denies having recently been to the doctor for antibiotics. The patient says he did have pneumonia like this several years ago and said symptoms were quite similar. The patient has had blood-tinged sputum, and while I am on the floor with the patient, he is producing quite a bit of a blood-tinged sputum. Sputum could be described more as a orin-type of sputum.     PAST MEDICAL HISTORY:  1.  Asthma.   2.  Hyperlipidemia.   3.  Lumbago.  4.  Hypothyroidism.   5.  Migraines.   6.  BPH.   7.  Pyelonephritis, and he was actually here in the hospital in January; was admitted under Dr. Shashi Arciniega, Dr. Sears; had some stents placed, was given some antibiotics, and has improved from that.     PAST SURGICAL HISTORY:  1.  Knee surgery.  2.  Hernia.  3.  Thyroidectomy.  4.  Cystoscopy, with transurethral resection of prostate; stents placed.     FAMILY HISTORY:  Diabetes, heart disease.     SOCIAL HISTORY:  Denies any smoking.     ALLERGIES:   1.  CIPRO.  2.  QUINOLONES.     REVIEW OF SYSTEMS:   CONSTITUTIONAL: Fevers, chills.   THROAT: Sore throat.  RESPIRATORY: Cough.   EYES: Negative.   CARDIOVASCULAR: Negative.   GASTROINTESTINAL: Diarrhea, nausea, vomiting. Denies any abdominal pain.   GENITOURINARY: No pain with urination.   MUSCULOSKELETAL:  Unremarkable.  SKIN: Unremarkable.  NEUROLOGIC: Dizziness, lightheadedness, headache. No difficulty with speech.  PSYCHIATRIC: Within normal limits.     PHYSICAL EXAMINATION:  VITAL SIGNS: Temperature is 100.4, heart rate is 122, respiratory rate is 18, blood pressure is 124/69, O2 saturation is 94%.   GENERAL: The patient is awake, alert, oriented x3.    HEENT: Extraocular muscles intact. Pupils round and reactive to light. Oropharynx clear.   NECK: Supple. No thyromegaly.   HEART: Is slightly tachycardic in the emergency room; but by the time I am seeing the patient, the heart rate is good. No murmurs, rubs, or gallops heard.   LUNGS: He has got very wet-sounding right lung, with what sounds like copious amounts of secretions within the chest; some wheezing/crackles on the right.  ABDOMEN: Soft, nontender, nondistended on my examination; per the ER notes, there is slight tenderness suprapubic left lower quadrant.   MUSCULOSKELETAL: Unremarkable.   NEUROLOGIC: Unremarkable.   SKIN: Warm/dry.   PSYCHIATRIC: Mood/affect within normal limits.     DIAGNOSTIC DATA:    1.  BUN and creatinine are 26 and 1.6. Sodium is 139, potassium is 3.8. Liver enzymes normal. White blood cell count is 22. Lactate is 2.2. Platelets 170,000, hemoglobin is 14.5. Influenza swab is negative.   2.  Chest x-ray: Acute right upper lobe pneumonia.   3.  Laboratory tests were reviewed and he does have slightly elevated creatinine and lactic acid, and we have ordered an ABG which is fairly unremarkable.     ASSESSMENT AND PLAN:  1.  Pneumonia.   2.  Sepsis syndrome with severe leukocytosis, hypotension requiring fluid boluses in the emergency room, serial lactic acid levels, and consultation with the intensivist.   3.  A history of asthma: We will use mini nebulizer.   4.  Hyperlipidemia.   5.  Lumbago.   6.  Hypothyroidism, for which we will continue with his home medications.   7.  History of migraines.   8.  BPH.   9.  Pyelonephritis; just  recently here in the hospital for this.     DISCUSSION:  As I am seeing the patient on the floor, the patient's oxygen requirement has gone up slightly; he was up to 6L earlier today, and now the nurse as I have discussed with her tonight, his oxygen requirement is down to about 4L; he is improving. His blood pressure is staying stable at about 110 and his heart rate is good. Lactic acid level has gone up to 4, and as mentioned we are going to repeat this; but with the fluids/antibiotics, this should be improving.     The orin-colored sputum can be caused by pneumonia or pulmonary embolism, possibly even lung cancer. Pulmonary embolism seems much less likely, given the fevers. It is somewhat lobar, but it does not extend to the periphery of  the lung on the CT scan; so this seems much less likely. Also would not expect his white count to be 22,000 with pulmonary embolism. I cannot get a D-dimer to help differentiate. The only other possibility would be to get lower extremity Dopplers, just to make sure that this lobar area is not related to a blood clot; so I think this is overwhelmingly consistent with pneumonia, and we will continue with the Rocephin and Zithromax for this patient; get sputum cultures. His viral respiratory panel is noted to be negative, and we will continue fluids, currently at 175.      Justin Bonilla M.D.  NIALL:ms  D:   03/08/2017 18:14:32  T:   03/08/2017 19:04:04  Job ID:   [id number]  Document ID:   [id number]  cc:

## 2017-03-09 NOTE — PROGRESS NOTES
BLEV doppler completed.  Preliminary results:   Negative for DVT bilateral lower extremities.  Results given to SHI Coleman.

## 2017-03-10 LAB
ANION GAP SERPL CALCULATED.3IONS-SCNC: 10.3 MMOL/L
BUN BLD-MCNC: 22 MG/DL (ref 8–23)
BUN/CREAT SERPL: 17.3 (ref 7–25)
CALCIUM SPEC-SCNC: 7.6 MG/DL (ref 8.6–10.5)
CHLORIDE SERPL-SCNC: 106 MMOL/L (ref 98–107)
CO2 SERPL-SCNC: 22.7 MMOL/L (ref 22–29)
CREAT BLD-MCNC: 1.27 MG/DL (ref 0.76–1.27)
D-LACTATE SERPL-SCNC: 0.9 MMOL/L (ref 0.5–2)
DEPRECATED RDW RBC AUTO: 46.7 FL (ref 37–54)
ERYTHROCYTE [DISTWIDTH] IN BLOOD BY AUTOMATED COUNT: 13.5 % (ref 11.5–14.5)
GFR SERPL CREATININE-BSD FRML MDRD: 55 ML/MIN/1.73
GLUCOSE BLD-MCNC: 115 MG/DL (ref 65–99)
HCT VFR BLD AUTO: 34.1 % (ref 40.4–52.2)
HGB BLD-MCNC: 11.2 G/DL (ref 13.7–17.6)
MCH RBC QN AUTO: 31.2 PG (ref 27–32.7)
MCHC RBC AUTO-ENTMCNC: 32.8 G/DL (ref 32.6–36.4)
MCV RBC AUTO: 95 FL (ref 79.8–96.2)
MRSA SPEC QL CULT: NORMAL
PLATELET # BLD AUTO: 123 10*3/MM3 (ref 140–500)
PMV BLD AUTO: 11.3 FL (ref 6–12)
POTASSIUM BLD-SCNC: 3.5 MMOL/L (ref 3.5–5.2)
PROCALCITONIN SERPL-MCNC: 10.21 NG/ML (ref 0.1–0.25)
RBC # BLD AUTO: 3.59 10*6/MM3 (ref 4.6–6)
SODIUM BLD-SCNC: 139 MMOL/L (ref 136–145)
WBC NRBC COR # BLD: 12.73 10*3/MM3 (ref 4.5–10.7)

## 2017-03-10 PROCEDURE — 83605 ASSAY OF LACTIC ACID: CPT | Performed by: INTERNAL MEDICINE

## 2017-03-10 PROCEDURE — 85027 COMPLETE CBC AUTOMATED: CPT | Performed by: INTERNAL MEDICINE

## 2017-03-10 PROCEDURE — 25010000003 CEFTRIAXONE PER 250 MG: Performed by: HOSPITALIST

## 2017-03-10 PROCEDURE — 84145 PROCALCITONIN (PCT): CPT | Performed by: INTERNAL MEDICINE

## 2017-03-10 PROCEDURE — 25010000002 HEPARIN (PORCINE) PER 1000 UNITS: Performed by: INTERNAL MEDICINE

## 2017-03-10 PROCEDURE — 80048 BASIC METABOLIC PNL TOTAL CA: CPT | Performed by: INTERNAL MEDICINE

## 2017-03-10 RX ADMIN — GUAIFENESIN AND DEXTROMETHORPHAN 5 ML: 100; 10 SYRUP ORAL at 19:52

## 2017-03-10 RX ADMIN — GUAIFENESIN AND DEXTROMETHORPHAN 5 ML: 100; 10 SYRUP ORAL at 05:40

## 2017-03-10 RX ADMIN — SODIUM CHLORIDE 175 ML/HR: 9 INJECTION, SOLUTION INTRAVENOUS at 05:54

## 2017-03-10 RX ADMIN — HYDROCODONE BITARTRATE AND ACETAMINOPHEN 1 TABLET: 5; 325 TABLET ORAL at 18:15

## 2017-03-10 RX ADMIN — HEPARIN SODIUM 5000 UNITS: 5000 INJECTION, SOLUTION INTRAVENOUS; SUBCUTANEOUS at 08:43

## 2017-03-10 RX ADMIN — ATORVASTATIN CALCIUM 10 MG: 10 TABLET, FILM COATED ORAL at 08:43

## 2017-03-10 RX ADMIN — PANTOPRAZOLE SODIUM 40 MG: 40 TABLET, DELAYED RELEASE ORAL at 05:42

## 2017-03-10 RX ADMIN — HEPARIN SODIUM 5000 UNITS: 5000 INJECTION, SOLUTION INTRAVENOUS; SUBCUTANEOUS at 23:06

## 2017-03-10 RX ADMIN — HYDROCODONE BITARTRATE AND ACETAMINOPHEN 1 TABLET: 5; 325 TABLET ORAL at 06:06

## 2017-03-10 RX ADMIN — CEFTRIAXONE SODIUM 2 G: 2 INJECTION, SOLUTION INTRAVENOUS at 10:18

## 2017-03-10 RX ADMIN — FLUTICASONE PROPIONATE 1 SPRAY: 50 SPRAY, METERED NASAL at 08:43

## 2017-03-10 RX ADMIN — AZITHROMYCIN DIHYDRATE 500 MG: 500 INJECTION, POWDER, LYOPHILIZED, FOR SOLUTION INTRAVENOUS at 14:15

## 2017-03-10 RX ADMIN — LEVOTHYROXINE SODIUM 175 MCG: 175 TABLET ORAL at 05:40

## 2017-03-10 NOTE — PROGRESS NOTES
[facility]     LOS: 2 days     Subjective:  No cp  No soa  In ICU this am     Objective:    Vital Signs:  Temp:  [97.7 °F (36.5 °C)-98.5 °F (36.9 °C)] 97.7 °F (36.5 °C)  Heart Rate:  [] 91  BP: ()/(48-81) 125/75    General Appearance: no acute distress, appears comfortable  Heart: regular rate and rhythm  Lungs: rhonchi and wheezes on the right  Abdomen: soft, non-tender, no guarding, no rebound, non-distended  Extremeties: no edema, no cyanosis  Neurology: speech normal, mental status normal  Mental Status: alert, oriented    Results Review:    GLUCOSE   Date Value Ref Range Status   03/10/2017 115 (H) 65 - 99 mg/dL Final   03/09/2017 112 (H) 65 - 99 mg/dL Final   03/08/2017 103 (H) 65 - 99 mg/dL Final       Results from last 7 days  Lab Units 03/10/17  0444   WBC 10*3/mm3 12.73*   HEMOGLOBIN g/dL 11.2*   HEMATOCRIT % 34.1*   PLATELETS 10*3/mm3 123*       Results from last 7 days  Lab Units 03/10/17  0444 03/09/17  0605   SODIUM mmol/L 139 139   POTASSIUM mmol/L 3.5 4.0   CHLORIDE mmol/L 106 106   TOTAL CO2 mmol/L 22.7 21.3*   BUN mg/dL 22 25*   CREATININE mg/dL 1.27 1.29*   CALCIUM mg/dL 7.6* 7.1*   BILIRUBIN mg/dL  --  0.6   ALK PHOS U/L  --  33*   ALT (SGPT) U/L  --  12   AST (SGOT) U/L  --  17   GLUCOSE mg/dL 115* 112*                 Results from last 7 days  Lab Units 03/08/17  1854   TROPONIN T ng/mL <0.010       I have reviewed daily medications and changes in CPOE    Scheduled meds    atorvastatin 10 mg Oral Daily   ceftriaxone 2 g Intravenous Q24H   And      azithromycin 500 mg Intravenous Q24H   fluticasone 1 spray Each Nare BID   heparin (porcine) 5,000 Units Subcutaneous Q12H   levothyroxine 175 mcg Oral Q AM   pantoprazole 40 mg Oral Q AM          PRN meds  •  acetaminophen  •  guaifenesin-dextromethorphan  •  HYDROcodone-acetaminophen  •  ipratropium-albuterol  •  ondansetron  •  sodium chloride  •  Insert peripheral IV **AND** sodium chloride      Doppler is  negative    Assessment:    Active Problems:    CAP (community acquired pneumonia)    Asthma    Hyperlipidemia    Lumbago    Hypothyroidism    Hx of migraine headaches    BPH (benign prostatic hyperplasia)   recent Pyelonephritis    Sepsis  arf    Plan:  Lactic acid is better    ekg was ok      troponin ok    Bp is better    nebs    Nasal steroids    Renal failure is better    Recurring pneumonias and late in life, concern this is from reflux. Will get esophogram and speech eval    >35 minutes spent, > 1/2 time spent counseling and coordination of care   D/w son, wife ad nurse    Justin Bonilla MD  03/10/17  1:17 PM

## 2017-03-10 NOTE — PLAN OF CARE
Problem: Patient Care Overview (Adult)  Goal: Plan of Care Review  Outcome: Ongoing (interventions implemented as appropriate)    03/10/17 1355   Outcome Evaluation   Outcome Summary/Follow up Plan iv antibioticss, video swallow study to check for reflux/asp. pneumoina       Goal: Adult Individualization and Mutuality  Outcome: Ongoing (interventions implemented as appropriate)  Goal: Discharge Needs Assessment  Outcome: Ongoing (interventions implemented as appropriate)    Problem: Fall Risk (Adult)  Goal: Identify Related Risk Factors and Signs and Symptoms  Outcome: Ongoing (interventions implemented as appropriate)  Goal: Absence of Falls  Outcome: Ongoing (interventions implemented as appropriate)    Problem: Respiratory Insufficiency (Adult)  Goal: Identify Related Risk Factors and Signs and Symptoms  Outcome: Ongoing (interventions implemented as appropriate)  Goal: Acid/Base Balance  Outcome: Ongoing (interventions implemented as appropriate)  Goal: Effective Ventilation  Outcome: Ongoing (interventions implemented as appropriate)    Problem: Infection, Risk/Actual (Adult)  Goal: Identify Related Risk Factors and Signs and Symptoms  Outcome: Ongoing (interventions implemented as appropriate)  Goal: Infection Prevention/Resolution  Outcome: Ongoing (interventions implemented as appropriate)    03/10/17 2005   Infection, Risk/Actual (Adult)   Infection Prevention/Resolution making progress toward outcome

## 2017-03-10 NOTE — PROGRESS NOTES
[facility] INPATIENT PROGRESS NOTE         [facility] INTENSIVE CARE    3/10/2017      PATIENT IDENTIFICATION:   Name:  Asa Magaña      MRN:  [id number]     76 y.o.  male             LOS 2    Reason for visit: f/u sepsis with shock, PNA and COPD        SUBJECTIVE:    .Sitting up at side of bed.  Denies chest pain, cough and blood pressure has improved.  Tolerating by mouth.    Objective   OBJECTIVE:    Vital Sign Min/Max for last 24 hours  Temp  Min: 98 °F (36.7 °C)  Max: 98.5 °F (36.9 °C)   BP  Min: 80/48  Max: 123/79   Pulse  Min: 62  Max: 104   No Data Recorded   SpO2  Min: 90 %  Max: 100 %   Flow (L/min)  Min: 4  Max: 4   No Data Recorded                         Body mass index is 27.76 kg/(m^2).    Intake/Output Summary (Last 24 hours) at 03/10/17 0931  Last data filed at 03/10/17 0600   Gross per 24 hour   Intake   3389 ml   Output   1400 ml   Net   1989 ml         Exam:  GEN:  No distress, appears stated age  EYES:   PERRL, anicteric sclera  ENT:    External ears/nose normal, OP clear  NECK:  No adenopathy, midline trachea  LUNGS: Normal chest on inspection, palpation and scattered course on auscultation  CV:  Normal S1S2, without murmur  ABD:  Non tender, non distended, no hepatosplenomegaly, +BS  EXT:  No edema, cyanosis or clubbing    Scheduled meds:      atorvastatin 10 mg Oral Daily   ceftriaxone 2 g Intravenous Q24H   And      azithromycin 500 mg Intravenous Q24H   fluticasone 1 spray Each Nare BID   heparin (porcine) 5,000 Units Subcutaneous Q12H   levothyroxine 175 mcg Oral Q AM   pantoprazole 40 mg Oral Q AM     IV meds:                         Data Review:    Results from last 7 days  Lab Units 03/10/17  0444 03/09/17  0605 03/08/17  0852   SODIUM mmol/L 139 139 139   POTASSIUM mmol/L 3.5 4.0 3.8   CHLORIDE mmol/L 106 106 101   TOTAL CO2 mmol/L 22.7 21.3* 24.2   BUN mg/dL 22 25* 26*   CREATININE mg/dL 1.27 1.29* 1.67*   GLUCOSE mg/dL 115* 112* 103*   CALCIUM mg/dL 7.6* 7.1* 8.9          Estimated Creatinine Clearance: 56 mL/min (by C-G formula based on Cr of 1.27).    Results from last 7 days  Lab Units 03/10/17  0444 03/09/17  0605 03/08/17  0852   WBC 10*3/mm3 12.73* 16.36* 22.23*   HEMOGLOBIN g/dL 11.2* 11.3* 14.5   PLATELETS 10*3/mm3 123* 139* 170           Results from last 7 days  Lab Units 03/09/17  0605 03/08/17  0852   ALT (SGPT) U/L 12 16   AST (SGOT) U/L 17 20       Results from last 7 days  Lab Units 03/08/17  1553   PH, ARTERIAL pH units 7.408   PO2 ART mm Hg 79.1*   PCO2, ARTERIAL mm Hg 33.9*   HCO3 ART mmol/L 21.4*       Lactate 0.9    Pro-calcitonin 10.2          Assessment   ASSESSMENT:   Sepsis with shock/hypotension  Right upper pneumonia  COPD Exacerbation  Diarrhea  Acute hypoxemic respiratory failure secondary to pneumonia  Lactic acidosis  Acute kidney injury      PLAN:  Can move back out of ICU.  Awaiting floor bed. Continue Abx.    DC IV fluids.  No need to continue serial lactic acid level.  Continue prn lety Winston MD  Pulmonary and Critical Care Medicine  [facility]  3/10/2017    9:31 AM

## 2017-03-10 NOTE — PLAN OF CARE
Problem: Fall Risk (Adult)  Goal: Identify Related Risk Factors and Signs and Symptoms  Outcome: Ongoing (interventions implemented as appropriate)  Goal: Absence of Falls  Outcome: Ongoing (interventions implemented as appropriate)    Problem: Respiratory Insufficiency (Adult)  Goal: Identify Related Risk Factors and Signs and Symptoms  Outcome: Ongoing (interventions implemented as appropriate)  Goal: Acid/Base Balance  Outcome: Ongoing (interventions implemented as appropriate)  Goal: Effective Ventilation  Outcome: Ongoing (interventions implemented as appropriate)    Problem: Infection, Risk/Actual (Adult)  Goal: Identify Related Risk Factors and Signs and Symptoms  Outcome: Ongoing (interventions implemented as appropriate)  Goal: Infection Prevention/Resolution  Outcome: Ongoing (interventions implemented as appropriate)

## 2017-03-11 ENCOUNTER — APPOINTMENT (OUTPATIENT)
Dept: GENERAL RADIOLOGY | Facility: HOSPITAL | Age: 77
End: 2017-03-11

## 2017-03-11 VITALS
RESPIRATION RATE: 18 BRPM | BODY MASS INDEX: 27.73 KG/M2 | DIASTOLIC BLOOD PRESSURE: 72 MMHG | HEIGHT: 72 IN | TEMPERATURE: 97 F | SYSTOLIC BLOOD PRESSURE: 123 MMHG | WEIGHT: 204.7 LBS | HEART RATE: 93 BPM | OXYGEN SATURATION: 94 %

## 2017-03-11 LAB
ANION GAP SERPL CALCULATED.3IONS-SCNC: 12.3 MMOL/L
BACTERIA SPEC RESP CULT: ABNORMAL
BACTERIA SPEC RESP CULT: ABNORMAL
BUN BLD-MCNC: 17 MG/DL (ref 8–23)
BUN/CREAT SERPL: 16.7 (ref 7–25)
CALCIUM SPEC-SCNC: 8.2 MG/DL (ref 8.6–10.5)
CHLORIDE SERPL-SCNC: 104 MMOL/L (ref 98–107)
CO2 SERPL-SCNC: 24.7 MMOL/L (ref 22–29)
CREAT BLD-MCNC: 1.02 MG/DL (ref 0.76–1.27)
DEPRECATED RDW RBC AUTO: 45.6 FL (ref 37–54)
ERYTHROCYTE [DISTWIDTH] IN BLOOD BY AUTOMATED COUNT: 13.6 % (ref 11.5–14.5)
GFR SERPL CREATININE-BSD FRML MDRD: 71 ML/MIN/1.73
GLUCOSE BLD-MCNC: 105 MG/DL (ref 65–99)
GRAM STN SPEC: ABNORMAL
HCT VFR BLD AUTO: 35 % (ref 40.4–52.2)
HGB BLD-MCNC: 11.8 G/DL (ref 13.7–17.6)
MCH RBC QN AUTO: 31.3 PG (ref 27–32.7)
MCHC RBC AUTO-ENTMCNC: 33.7 G/DL (ref 32.6–36.4)
MCV RBC AUTO: 92.8 FL (ref 79.8–96.2)
PLATELET # BLD AUTO: 172 10*3/MM3 (ref 140–500)
PMV BLD AUTO: 11.3 FL (ref 6–12)
POTASSIUM BLD-SCNC: 3.8 MMOL/L (ref 3.5–5.2)
RBC # BLD AUTO: 3.77 10*6/MM3 (ref 4.6–6)
SODIUM BLD-SCNC: 141 MMOL/L (ref 136–145)
WBC NRBC COR # BLD: 11.57 10*3/MM3 (ref 4.5–10.7)

## 2017-03-11 PROCEDURE — 25010000002 HEPARIN (PORCINE) PER 1000 UNITS: Performed by: INTERNAL MEDICINE

## 2017-03-11 PROCEDURE — 92611 MOTION FLUOROSCOPY/SWALLOW: CPT

## 2017-03-11 PROCEDURE — 25010000003 CEFTRIAXONE PER 250 MG: Performed by: HOSPITALIST

## 2017-03-11 PROCEDURE — 85027 COMPLETE CBC AUTOMATED: CPT | Performed by: INTERNAL MEDICINE

## 2017-03-11 PROCEDURE — 74220 X-RAY XM ESOPHAGUS 1CNTRST: CPT

## 2017-03-11 PROCEDURE — 74230 X-RAY XM SWLNG FUNCJ C+: CPT

## 2017-03-11 PROCEDURE — 80048 BASIC METABOLIC PNL TOTAL CA: CPT | Performed by: INTERNAL MEDICINE

## 2017-03-11 RX ORDER — SUMATRIPTAN 50 MG/1
TABLET, FILM COATED ORAL
Refills: 0
Start: 2017-03-11 | End: 2021-03-25

## 2017-03-11 RX ORDER — AZITHROMYCIN 500 MG/1
500 TABLET, FILM COATED ORAL DAILY
Qty: 2 TABLET | Refills: 0 | Status: SHIPPED | OUTPATIENT
Start: 2017-03-11 | End: 2017-03-13

## 2017-03-11 RX ORDER — ACETAMINOPHEN 325 MG/1
650 TABLET ORAL EVERY 4 HOURS PRN
Refills: 0
Start: 2017-03-11 | End: 2020-03-14 | Stop reason: HOSPADM

## 2017-03-11 RX ORDER — SUMATRIPTAN 50 MG/1
50 TABLET, FILM COATED ORAL ONCE
Status: DISCONTINUED | OUTPATIENT
Start: 2017-03-11 | End: 2017-03-11 | Stop reason: HOSPADM

## 2017-03-11 RX ORDER — CEFDINIR 300 MG/1
300 CAPSULE ORAL 2 TIMES DAILY
Qty: 14 CAPSULE | Refills: 0 | Status: SHIPPED | OUTPATIENT
Start: 2017-03-11 | End: 2017-03-18

## 2017-03-11 RX ADMIN — AZITHROMYCIN DIHYDRATE 500 MG: 500 INJECTION, POWDER, LYOPHILIZED, FOR SOLUTION INTRAVENOUS at 15:31

## 2017-03-11 RX ADMIN — FLUTICASONE PROPIONATE 1 SPRAY: 50 SPRAY, METERED NASAL at 08:31

## 2017-03-11 RX ADMIN — HYDROCODONE BITARTRATE AND ACETAMINOPHEN 1 TABLET: 5; 325 TABLET ORAL at 10:16

## 2017-03-11 RX ADMIN — PANTOPRAZOLE SODIUM 40 MG: 40 TABLET, DELAYED RELEASE ORAL at 05:23

## 2017-03-11 RX ADMIN — ATORVASTATIN CALCIUM 10 MG: 10 TABLET, FILM COATED ORAL at 08:31

## 2017-03-11 RX ADMIN — CEFTRIAXONE SODIUM 2 G: 2 INJECTION, SOLUTION INTRAVENOUS at 10:17

## 2017-03-11 RX ADMIN — LEVOTHYROXINE SODIUM 175 MCG: 175 TABLET ORAL at 05:23

## 2017-03-11 RX ADMIN — HEPARIN SODIUM 5000 UNITS: 5000 INJECTION, SOLUTION INTRAVENOUS; SUBCUTANEOUS at 08:31

## 2017-03-11 NOTE — PLAN OF CARE
Problem: Patient Care Overview (Adult)  Goal: Plan of Care Review    03/11/17 0932   Coping/Psychosocial Response Interventions   Plan Of Care Reviewed With patient   Outcome Evaluation   Outcome Summary/Follow up Plan VFSS completed. No penetration/aspiration with thins. Pt did exhibit moderate pharyngeal residue with solids post swallow (only able to clear with a liquid wash), pt did have trace penetration with regular residue, but spontaneously cleared with cough/reswallow. No reflux observed on this study, however pt was c/o esophageal s/s of reflux. SLP recs dedicated esophageal study. It is possible that pharyngeal residue with regular could be contributing to recurrent pna.          Problem: Inpatient SLP  Goal: Dysphagia- Patient will safely consume diet as per recommendation with no signs/symptoms of aspiration    03/11/17 0932   Safely Consume Diet   Safely Consume Diet- SLP, Date Established 03/11/17   Safely Consume Diet- SLP, Time to Achieve by discharge

## 2017-03-11 NOTE — PROGRESS NOTES
[facility] INPATIENT PROGRESS NOTE         [address]    3/11/2017      PATIENT IDENTIFICATION:   Name:  Asa Magaña      MRN:  [id number]     76 y.o.  male             LOS 3    Reason for visit: f/u sepsis with shock, PNA and COPD        SUBJECTIVE:   Strep pneumoniae right upper lobe.    Objective   OBJECTIVE:    Vital Sign Min/Max for last 24 hours  Temp  Min: 97.5 °F (36.4 °C)  Max: 98.9 °F (37.2 °C)   BP  Min: 120/63  Max: 148/78   Pulse  Min: 62  Max: 90   Resp  Min: 16  Max: 18   SpO2  Min: 94 %  Max: 96 %   No Data Recorded   No Data Recorded         Body mass index is 27.76 kg/(m^2).    Intake/Output Summary (Last 24 hours) at 03/11/17 1608  Last data filed at 03/11/17 0907   Gross per 24 hour   Intake    240 ml   Output   2150 ml   Net  -1910 ml       Exam:  GEN:  No distress, appears stated age, off oxygen  EYES:   PERRL, anicteric sclera  ENT:    External ears/nose normal, OP clear  NECK:  No adenopathy, midline trachea  LUNGS: Normal chest on inspection, palpation and scattered course on auscultation  CV:  Normal S1S2, without murmur  ABD:  Non tender, non distended, no hepatosplenomegaly, +BS  EXT:  No edema, cyanosis or clubbing    Scheduled meds:      atorvastatin 10 mg Oral Daily   ceftriaxone 2 g Intravenous Q24H   And      azithromycin 500 mg Intravenous Q24H   fluticasone 1 spray Each Nare BID   heparin (porcine) 5,000 Units Subcutaneous Q12H   levothyroxine 175 mcg Oral Q AM   pantoprazole 40 mg Oral Q AM   SUMAtriptan 50 mg Oral Once     IV meds:                         Data Review:    Results from last 7 days  Lab Units 03/11/17  0605 03/10/17  0444 03/09/17  0605 03/08/17  0852   SODIUM mmol/L 141 139 139 139   POTASSIUM mmol/L 3.8 3.5 4.0 3.8   CHLORIDE mmol/L 104 106 106 101   TOTAL CO2 mmol/L 24.7 22.7 21.3* 24.2   BUN mg/dL 17 22 25* 26*   CREATININE mg/dL 1.02 1.27 1.29* 1.67*   GLUCOSE mg/dL 105* 115* 112* 103*   CALCIUM mg/dL 8.2* 7.6* 7.1* 8.9         Estimated  Creatinine Clearance: 69.7 mL/min (by C-G formula based on Cr of 1.02).    Results from last 7 days  Lab Units 03/11/17  0605 03/10/17  0444 03/09/17  0605 03/08/17  0852   WBC 10*3/mm3 11.57* 12.73* 16.36* 22.23*   HEMOGLOBIN g/dL 11.8* 11.2* 11.3* 14.5   PLATELETS 10*3/mm3 172 123* 139* 170           Results from last 7 days  Lab Units 03/09/17  0605 03/08/17  0852   ALT (SGPT) U/L 12 16   AST (SGOT) U/L 17 20       Results from last 7 days  Lab Units 03/08/17  1553   PH, ARTERIAL pH units 7.408   PO2 ART mm Hg 79.1*   PCO2, ARTERIAL mm Hg 33.9*   HCO3 ART mmol/L 21.4*     Cultures; strep pneumoniae       Assessment   ASSESSMENT:   Sepsis with shock/hypotension, resolved  Right upper pneumonia, strep pneumoniae  COPD Exacerbation  Diarrhea  Acute hypoxemic respiratory failure secondary to pneumonia, resolved   Lactic acidosis, resolved  Acute kidney injury, resolved      PLAN:  Patient is going home on Azithromycin and  Omnicef  Discussed with Dr. Bonilla  Patient does not have any primary care docs of present, his primary care doctor retired and is in the process of looking for a  Physician.  I have offered him to follow-up.  Patient will follow-up with me in 4 weeks and we will do a chest x-ray in the office to see the complete resolution.        Yanet Moon MD  Pulmonary and Critical Care Medicine  [facility], [facility]  3/11/2017    4:08 PM

## 2017-03-11 NOTE — DISCHARGE INSTR - LAB
• Needs prevnar shot     • Chest x ray in 4 weeks with Dr. Moon     • Follow-up primary care doctor 1-2 weeks     • CBC & BMP in 1-2 weeks

## 2017-03-11 NOTE — PLAN OF CARE
Problem: Patient Care Overview (Adult)  Goal: Plan of Care Review  Outcome: Ongoing (interventions implemented as appropriate)    03/11/17 1445   Coping/Psychosocial Response Interventions   Plan Of Care Reviewed With patient   Patient Care Overview   Progress improving   Outcome Evaluation   Outcome Summary/Follow up Plan VFSS & esphogram completed, VSS, pt on room air, denies pain or SOA, states ready to go home       Goal: Adult Individualization and Mutuality  Outcome: Ongoing (interventions implemented as appropriate)  Goal: Discharge Needs Assessment  Outcome: Ongoing (interventions implemented as appropriate)    Problem: Fall Risk (Adult)  Goal: Identify Related Risk Factors and Signs and Symptoms  Outcome: Ongoing (interventions implemented as appropriate)  Goal: Absence of Falls  Outcome: Ongoing (interventions implemented as appropriate)    Problem: Respiratory Insufficiency (Adult)  Goal: Identify Related Risk Factors and Signs and Symptoms  Outcome: Ongoing (interventions implemented as appropriate)  Goal: Acid/Base Balance  Outcome: Ongoing (interventions implemented as appropriate)  Goal: Effective Ventilation  Outcome: Ongoing (interventions implemented as appropriate)    Problem: Infection, Risk/Actual (Adult)  Goal: Identify Related Risk Factors and Signs and Symptoms  Outcome: Ongoing (interventions implemented as appropriate)

## 2017-03-11 NOTE — PLAN OF CARE
Problem: Patient Care Overview (Adult)  Goal: Plan of Care Review  Outcome: Ongoing (interventions implemented as appropriate)    03/11/17 0502   Coping/Psychosocial Response Interventions   Plan Of Care Reviewed With patient   Patient Care Overview   Progress improving   Outcome Evaluation   Outcome Summary/Follow up Plan pt had a good night. Cough reduced with cough syrup. Had some chest pain last night with cough and deep breathing, resolved now. Uses urinal.          Problem: Fall Risk (Adult)  Goal: Identify Related Risk Factors and Signs and Symptoms  Outcome: Ongoing (interventions implemented as appropriate)  Goal: Absence of Falls  Outcome: Ongoing (interventions implemented as appropriate)    Problem: Respiratory Insufficiency (Adult)  Goal: Identify Related Risk Factors and Signs and Symptoms  Outcome: Ongoing (interventions implemented as appropriate)  Goal: Acid/Base Balance  Outcome: Ongoing (interventions implemented as appropriate)  Goal: Effective Ventilation  Outcome: Ongoing (interventions implemented as appropriate)    Problem: Infection, Risk/Actual (Adult)  Goal: Identify Related Risk Factors and Signs and Symptoms  Outcome: Ongoing (interventions implemented as appropriate)  Goal: Infection Prevention/Resolution  Outcome: Ongoing (interventions implemented as appropriate)

## 2017-03-11 NOTE — PROGRESS NOTES
Acute Care - Speech Language Pathology   Swallow Initial Evaluation-VFSS [facility]     Patient Name: Asa Magaña  : 1940  MRN: [id number]  Today's Date: 3/11/2017               Admit Date: 3/8/2017     SPEECH-LANGUAGE PATHOLOGY EVALUTION - VFSS  Subjective: The patient was seen on this date for a VFSS(Videofluoroscopic Swallowing Study).  Patient was alert and cooperative.    The patient's history is significant for recurrent pna.   Objective: Risks/benefits were reviewed with the patient, and consent was obtained. The study was completed with SLP present and Radiologist review. The patient was seen in lateral view(s). Textures given included thin liquid, nectar thick liquid, puree consistency, mechanical soft consistency and regular consistency.  Assessment: Difficulties were noted with regular consistency. Esophageal screen was performed.     SLP Findings: Pt presents with mild pharyngeal dysphagia. Pt without pen/asp with thins despite delayed coughing observed. No pen/asp with nectar via straw, pt with trace pharyngeal residue post swallow. No pen/asp puree, mild pharyngeal residue, pt cleared to trace without cues. Spill to pyriforms with mixed. No pen/asp. Mild residue, pt cleared to trace without cues. With regular, normal bolus prep. Pt with moderate pharyngeal residue post swallow. Unable to clear with a double swallow. Pt did clear somewhat with a liquid wash. Pt was coughing and c/o obstruction. SLP did observed, trace posterior penetration with regular, pt coughed spontaneously to clear. Esophageal scan revealed adequate emptying with thins and regular. MD concerned for asp pna from reflux, SLP recommended dedicated study d/t this. Pt is at a moderate risk to aspirate from stasis with regular foods.   Recommendations: Diet Textures: thin liquid, soft solids food. Medications should be taken whole with puree.   Recommended Strategies: alternate liquids and solids. Oral care before  breakfast, after all meals and PRN.  Dysphagia therapy is recommended. Rationale: to improve pharyngeal strength.          Visit Dx:     ICD-10-CM ICD-9-CM   1. CAP (community acquired pneumonia) J18.9 486     Patient Active Problem List   Diagnosis   • Asthma   • Hyperlipidemia   • Lumbago   • Hypothyroidism   • Hx of migraine headaches   • BPH (benign prostatic hyperplasia)   • Pyelonephritis   • CAP (community acquired pneumonia)   • Sepsis     Past Medical History   Diagnosis Date   • Asthma    • BPH (benign prostatic hyperplasia)    • Hx of migraine headaches    • Hyperlipidemia    • Hypothyroidism    • Lumbago      Past Surgical History   Procedure Laterality Date   • Knee surgery     • Hernia repair     • Thyroidectomy     • Cystoscopy transurethral resection of prostate     • Cystoscopy w/ ureteral stent placement Right 1/20/2017     Procedure: CYSTOSCOPY, ureteroscopy,  URETERAL STENT INSERTION, RT;  Surgeon: Shashi Arciniega MD;  Location: [facility];  Service:           SWALLOW EVALUATION (last 72 hours)      Swallow Evaluation       03/11/17 0900                Rehab Evaluation    Document Type evaluation  -SA        Subjective Information no complaints;agree to therapy  -SA        Patient Effort, Rehab Treatment excellent  -SA        Symptoms Noted During/After Treatment none  -SA        General Information    Patient Profile Review yes  -SA        Subjective Patient Observations Pt c/o dysphagia with solids and pills  -SA        Pertinent History Of Current Problem Recurrent pna  -SA        Current Diet Limitations thin liquids;regular solid  -SA        Prior Level of Function- Swallowing no diet consistency restrictions  -SA        Plans/Goals Discussed With patient  -SA        Barriers to Rehab none identified  -SA        Clinical Impression    Patient's Goals For Discharge patient did not state  -SA        SLP Swallowing Diagnosis mild dysphagia  -SA        Rehab Potential/Prognosis,  Swallowing good, to achieve stated therapy goals  -SA        Criteria for Skilled Therapeutic Interventions Met skilled criteria for dysphagia intervention met  -SA        FCM, Swallowing 5-->Level 5  -SA        Therapy Frequency PRN  -SA        SLP Diet Recommendation soft textures;thin liquids  -SA        Recommended Feeding/Eating Techniques alternate between small bites and sips of food/liquid  -SA        SLP Rec. for Method of Medication Administration meds whole in pudding/applesauce  -SA        Anticipated Discharge Disposition other (see comments)   pending POC  -SA        Pain Assessment    Pain Assessment No/denies pain  -SA        SLP Communication to Radiology    Severity Level of Dysphagia mild dysphagia  -SA        Consistencies Aspirated/Penetrated penetrated:;cohesive solid  -SA        Summary Statement Pt presents with mild pharyngeal dysphagia. Pt without pen/asp with thins despite delayed coughing observed. No pen/asp with nectar via straw, pt with trace pharyngeal residue post swallow. No pen/asp puree, mild pharyngeal residue, pt cleared to trace without cues. Spill to pyriforms with mixed. No pen/asp. Mild residue, pt cleared to trace without cues. With regular, normal bolus prep. Pt with moderate pharyngeal residue post swallow. Unable to clear with a double swallow. Pt did clear somewhat with a liquid wash. Pt was coughing and c/o obstruction. SLP did observed, trace posterior penetration with regular, pt coughed spontaneously to clear. Esophageal scan revealed adequate emptying with thins and regular. MD concerned for asp pna from reflux, SLP recommended dedicated study d/t this. Pt is at a moderate risk to aspirate from stasis with regular foods.   -SA        Dysphagia Treatment Objectives and Progress    Dysphagia Treatment Objectives Improve tongue base & pharyngeal wall squeeze  -SA        Improve tongue base & pharyngeal wall squeeze    To improve tongue base & pharyngeal wall squeeze,  patient will: Complete gargle/hold retraction;Complete yawn/hold retractions;Complete tongue base retraction;Complete effortful swallow;Complete tongue hold swallow;80%;with inconsistent cues  -SA        Status: Improve tongue base & pharyngeal wall squeeze New  -SA          User Key  (r) = Recorded By, (t) = Taken By, (c) = Cosigned By    Initials Name Effective Dates    SA Mayra Arredondo 12/11/15 -         EDUCATION  The patient has been educated in the following areas:   Dysphagia (Swallowing Impairment).    SLP Recommendation and Plan  SLP Swallowing Diagnosis: mild dysphagia  SLP Diet Recommendation: soft textures, thin liquids  Recommended Feeding/Eating Techniques: alternate between small bites and sips of food/liquid  SLP Rec. for Method of Medication Administration: meds whole in pudding/applesauce        Criteria for Skilled Therapeutic Interventions Met: skilled criteria for dysphagia intervention met  Anticipated Discharge Disposition: other (see comments) (pending POC)  Rehab Potential/Prognosis, Swallowing: good, to achieve stated therapy goals  Therapy Frequency: PRN             Plan of Care Review  Plan Of Care Reviewed With: patient  Outcome Summary/Follow up Plan: VFSS completed. No penetration/aspiration with thins. Pt did exhibit moderate pharyngeal residue with solids post swallow (only able to clear with a liquid wash), pt did have trace penetration with regular residue, but spontaneously cleared with a liquid wash. No reflux observed on this study, however pt was c/o esophageal s/s of reflux. SLP recs dedicated esophageal study. It is possible that pharyngeal residue with regular could be contributing to recurrent pna.           IP SLP Goals       03/11/17 0932          Safely Consume Diet    Safely Consume Diet- SLP, Date Established 03/11/17  -SA      Safely Consume Diet- SLP, Time to Achieve by discharge  -SA        User Key  (r) = Recorded By, (t) = Taken By, (c) = Cosigned By     Initials Name Provider Type     Mayra Arredondo Speech and Language Pathologist             SLP Outcome Measures (last 72 hours)      SLP Outcome Measures       03/11/17 1200          SLP Outcome Measures    Outcome Measure Used? Adult NOMS  -SA      FCM Scores    FCM Chosen Swallowing  -      Swallowing FCM Score 5  -        User Key  (r) = Recorded By, (t) = Taken By, (c) = Cosigned By    Initials Name Effective Dates     Mayra Arredondo 12/11/15 -            Time Calculation:         Time Calculation- SLP       03/11/17 1216          Time Calculation- SLP    SLP Received On 03/11/17  -        User Key  (r) = Recorded By, (t) = Taken By, (c) = Cosigned By    Initials Name Provider Type     Mayra Arredondo Speech and Language Pathologist          Therapy Charges for Today     Code Description Service Date Service Provider Modifiers Qty    [id number] HC ST MOTION FLUORO EVAL SWALLOW 4 3/11/2017 Mayra Arredondo GN 1               Mayra Arredondo  3/11/2017

## 2017-03-11 NOTE — DISCHARGE SUMMARY
"           Name: Asa Magaña ADMIT: 3/8/2017   : 1940  PCP: No Known Provider    MRN: [id number] LOS: 3 days   AGE/SEX: 76 y.o. male  ROOM: [facility]/   Date of Discharge:  3/11/2017    Brief Discharge Summary    Please see dictated discharge summary    DISCHARGE DIAGNOSIS  Active Hospital Problems (** Indicates Principal Problem)    Diagnosis Date Noted   • CAP (community acquired pneumonia) [J18.9] 2017     Priority: High   • Sepsis [A41.9] 2017   • Pyelonephritis [N12] 2017   • Asthma [J45.909]    • Hyperlipidemia [E78.5]    • Lumbago [M54.5]    • Hypothyroidism [E03.9]    • Hx of migraine headaches [Z86.69]    • BPH (benign prostatic hyperplasia) [N40.0]       Resolved Hospital Problems    Diagnosis Date Noted Date Resolved   No resolved problems to display.       SECONDARY DIAGNOSES  Past Medical History   Diagnosis Date   • Asthma    • BPH (benign prostatic hyperplasia)    • Hx of migraine headaches    • Hyperlipidemia    • Hypothyroidism    • Lumbago        CONSULTS   Consults     Date and Time Order Name Status Description    3/8/2017 1225 Inpatient Consult to Pulmonology Completed     3/8/2017 1015 [facility] (on-call MD unless specified) Completed           PROCEDURES PERFORMED      VITAL SIGNS  Visit Vitals   • /63 (BP Location: Left arm, Patient Position: Sitting)   • Pulse 62   • Temp 97.6 °F (36.4 °C) (Oral)   • Resp 18   • Ht 72\" (182.9 cm)   • Wt 204 lb 11.2 oz (92.9 kg)   • SpO2 96%   • BMI 27.76 kg/m2     ObjectiveCONDITION ON DISCHARGE  Stable.     DISCHARGE DISPOSITION   Home or Self Care      DISCHARGE MEDICATIONS  See Discharge summary     No future appointments.  Additional Instructions for the Follow-ups that You Need to Schedule     • Schedule a follow-up chest x-ray in 6 - 8 weeks.               TEST  RESULTS PENDING AT DISCHARGE   Order Current Status    Blood Culture Preliminary result    Blood Culture Preliminary result    Blood Culture Preliminary result    " Blood Culture Preliminary result        needs prevnar    Chest x ray in 6 weeks    F/u pmd 1-2 weeks    Cbc and bmp in 1-2 weeks    Justin Bonilla MD  [facility]  03/11/17  4:05 PM      Time: greater than 30 minutes.      Dragon disclaimer:  Much of this encounter note is an electronic transcription/translation of spoken language to printed text. The electronic translation of spoken language may permit erroneous, or at times, nonsensical words or phrases to be inadvertently transcribed; Although I have reviewed the note for such errors, some may still exist.

## 2017-03-12 NOTE — DISCHARGE SUMMARY
DISCHARGE DIAGNOSES:  1. Pneumococcal pneumonia.   2. History of reflux.   3. History of asthma, uses p.r.n. albuterol at home.   4. Hyperlipidemia.   5. Lumbago.  6. Hypothyroidism.   7. History of migraines for which he did receive a dose of Imitrex in the emergency room. He did have a migraine this morning. We were actually giving him a dose of the Imitrex, but the migraines have already let up.   8. Recent pyelonephritis.   9. Acute renal failure on admission.   10. Sepsis on admission, requiring monitoring in the intensive care unit.  11. Hypotension.  12. Lactic acidosis secondary to sepsis syndrome.   13. Acute hypoxic respiratory failure.    HISTORY/HOSPITAL COURSE: This is a 76-year-old gentleman who comes to the hospital because of nausea and vomiting that occurred going on for about 12 hours. The patient says he had some fevers and then developed cough and a little bit later in the night, developed severe nausea, vomiting, and came to the emergency room for further evaluation and treatment of this. In the emergency room, chest x-ray shows right upper lobe pneumonia. White blood cell count of 22,000. The patient was started on Zithromax and Rocephin and with this he had a dramatic improvement in his symptoms.     The patient initially was slightly hypotensive, systolic blood pressure in the 90s did get down into the upper 80s. He was given a 30 per kilogram bolus in the emergency room and then he was given another 500 mL bolus on the floor. Despite this, his blood pressure continued to decrease, so he was monitored in the intensive care unit overnight. The patient was made a overflow in the intensive care unit the following day, but because of lack of monitored beds, he stayed in the intensive care unit. The patient is now on the floor today. He has been weaned back to room air. Secretions have improved dramatically.     There was concern because the patient had orin color sputum on admission for the  possibility of pulmonary embolism, but the patients D-dimer was normal. We got lower extremity Dopplers and they were negative.     There was also concern about the possibility of aspiration pneumonia, but the CT scan does not show that the aspirations really occurred in a dependent area, but a little bit unusual and does occur in the upper lobe.     Because of the concern for possible aspiration and the patients age of 76, and he has had recurrent pneumonias and bronchitis, we got speech evaluation and speech evaluation was negative. Esophagram that was largely unremarkable, showed some mild reflux. The patient is already taking precautions at night in order to help with reflux and I encouraged the patient to sit the head of his bed up slightly at night also to further help with reflux. The patient takes PPIs intermittently.     The patient had acute renal failure on admission with creatinine 1.6 and is now back to normal. The patient's platelets did drop during hospitalization but they have since returned back to normal at 172,000. His white blood cell count initially was 22, it is down to 11.5, so this will need to be followed up outpatient. Procalcitonin was trending also in the right direction. We did get an EKG. Baseline EKG shows sinus tachycardia. We did get MRSA swab of the nose and it was negative. Sputum culture ultimately did show Streptococcus pneumonia, sensitive to Rocephin, Levaquin, erythromycin, and penicillin. Influenza testing was negative. Respiratory viral panel was negative. Blood cultures x4 negative. ABGs on admission pH was 7.4, pO2 was 79. He did have some hypoxia on admission and required up to about 5L of oxygen. He did have some acute hypoxic respiratory failure on admission. The patient today is feeling well. He is anxious for discharge.     MEDICATIONS:  1. Tylenol p.r.n.   2. Zocor 20 p.o. daily.  3. Omnicef 300 b.i.d. x7 more days, been sent to his pharmacy.   4. Zithromax 500 p.o.  b.i.d. x2 more days.  5. Flonase 1 spray as needed.   6. Synthroid 175 mcg p.o. daily.   7. Prevacid 30 p.o. daily.     FOLLOWUP: The patient is to followup with her primary care doctor 2-3 weeks. I would like for him to get a CBC and BMP at that time and the patient will need a chest x-ray in approximately 6 weeks to document resolution of the infiltrate and also make sure the patient has not developed any effusion. We did get a chest x-ray today with the esophagram and it does not show any evidence of effusion.     Greater than 30 minutes was spent in discharge planning.             Justin Bonilla M.D.  NIALL:eduard  D:   03/11/2017 14:58:36  T:   03/11/2017 22:01:21  Job ID:   [id number]  Document ID:   [id number]  cc:

## 2017-03-13 LAB
BACTERIA SPEC AEROBE CULT: NORMAL

## 2017-07-27 ENCOUNTER — OFFICE VISIT (OUTPATIENT)
Dept: ORTHOPEDIC SURGERY | Facility: CLINIC | Age: 77
End: 2017-07-27

## 2017-07-27 VITALS — HEIGHT: 72 IN | TEMPERATURE: 98.4 F | WEIGHT: 206 LBS | BODY MASS INDEX: 27.9 KG/M2

## 2017-07-27 DIAGNOSIS — G89.29 CHRONIC PAIN OF BOTH KNEES: Primary | ICD-10-CM

## 2017-07-27 DIAGNOSIS — M25.561 CHRONIC PAIN OF BOTH KNEES: Primary | ICD-10-CM

## 2017-07-27 DIAGNOSIS — M25.562 CHRONIC PAIN OF BOTH KNEES: Primary | ICD-10-CM

## 2017-07-27 PROCEDURE — 99213 OFFICE O/P EST LOW 20 MIN: CPT | Performed by: NURSE PRACTITIONER

## 2017-07-27 PROCEDURE — 73562 X-RAY EXAM OF KNEE 3: CPT | Performed by: NURSE PRACTITIONER

## 2017-07-27 PROCEDURE — 20610 DRAIN/INJ JOINT/BURSA W/O US: CPT | Performed by: NURSE PRACTITIONER

## 2017-07-27 RX ORDER — ROSUVASTATIN CALCIUM 5 MG/1
TABLET, COATED ORAL
Refills: 3 | COMMUNITY
Start: 2017-05-10 | End: 2018-08-16

## 2017-07-27 RX ORDER — LEVOTHYROXINE SODIUM 0.07 MG/1
TABLET ORAL
Refills: 3 | COMMUNITY
Start: 2017-04-29 | End: 2017-07-27 | Stop reason: SDUPTHER

## 2017-07-27 RX ORDER — BUPIVACAINE HYDROCHLORIDE 2.5 MG/ML
2 INJECTION, SOLUTION INFILTRATION; PERINEURAL
Status: COMPLETED | OUTPATIENT
Start: 2017-07-27 | End: 2017-07-27

## 2017-07-27 RX ORDER — FINASTERIDE 5 MG/1
5 TABLET, FILM COATED ORAL NIGHTLY
COMMUNITY

## 2017-07-27 RX ORDER — LIDOCAINE HYDROCHLORIDE 10 MG/ML
4 INJECTION, SOLUTION INFILTRATION; PERINEURAL
Status: COMPLETED | OUTPATIENT
Start: 2017-07-27 | End: 2017-07-27

## 2017-07-27 RX ORDER — METHYLPREDNISOLONE ACETATE 80 MG/ML
80 INJECTION, SUSPENSION INTRA-ARTICULAR; INTRALESIONAL; INTRAMUSCULAR; SOFT TISSUE
Status: COMPLETED | OUTPATIENT
Start: 2017-07-27 | End: 2017-07-27

## 2017-07-27 RX ADMIN — METHYLPREDNISOLONE ACETATE 80 MG: 80 INJECTION, SUSPENSION INTRA-ARTICULAR; INTRALESIONAL; INTRAMUSCULAR; SOFT TISSUE at 15:16

## 2017-07-27 RX ADMIN — BUPIVACAINE HYDROCHLORIDE 2 ML: 2.5 INJECTION, SOLUTION INFILTRATION; PERINEURAL at 15:16

## 2017-07-27 RX ADMIN — LIDOCAINE HYDROCHLORIDE 4 ML: 10 INJECTION, SOLUTION INFILTRATION; PERINEURAL at 15:16

## 2017-07-27 NOTE — PROGRESS NOTES
"Patient: Asa Magaña  YOB: 1940 77 y.o. male  Medical Record Number: [id number]    Chief Complaints:   Chief Complaint   Patient presents with   • Left Knee - Pain   • Right Knee - Pain       History of Present Illness:Asa Magaña is a 77 y.o. male who presents With complaints of bilateral knee pain.  [facility]  He has a history of bilateral knee arthroscopies.  He reports the left knee is worse than the right.  Denies any injury.  He did have an MRI recently which showed significant arthritis and meniscus tears    Allergies:   Allergies   Allergen Reactions   • Ciprofloxacin Hives and Palpitations   • Quinolones Hives and Palpitations       Medications:   Current Outpatient Prescriptions   Medication Sig Dispense Refill   • finasteride (PROSCAR) 5 MG tablet Take 5 mg by mouth Daily.     • acetaminophen (TYLENOL) 325 MG tablet Take 2 tablets by mouth Every 4 (Four) Hours As Needed for Mild Pain (1-3).  0   • levothyroxine (SYNTHROID, LEVOTHROID) 88 MCG tablet Take 0.175 mcg by mouth.     • rosuvastatin (CRESTOR) 5 MG tablet TK 1 T PO D  3   • simvastatin (ZOCOR) 20 MG tablet Take 20 mg by mouth Every Night.     • SUMAtriptan (IMITREX) 50 MG tablet Take one tablet at onset of headache. May repeat dose one time in 2 hours if headache not relieved.  0     No current facility-administered medications for this visit.          The following portions of the patient's history were reviewed and updated as appropriate: allergies, current medications, past family history, past medical history, past social history, past surgical history and problem list.    Review of Systems:   A 14 point review of systems was performed. All systems negative except pertinent positives/negative listed in HPI above    Physical Exam:   Vitals:    07/27/17 1356   Temp: 98.4 °F (36.9 °C)   Weight: 206 lb (93.4 kg)   Height: 72\" (182.9 cm)       General: A and O x 3, ASA, NAD    SCLERA:    " Normal    DENTITION:   Normal  Skin clear no unusual lesions noted  Bilateral knees no appreciable effusion 120° flexion neutral in extension with a positive medial Trip negative Lockman calf is soft nontender    Radiology:  Xrays 3views (ap,lateral, sunrise) bilateral knees were ordered and reviewed today secondary to pain and show significant arthritic changes.  Comparative views show definite worsening and progression of arthritis     Assessment/Plan:  Osteoarthritis bilateral knees    Dr. Desai saw the patient as well.  We will proceed with left knee cortisone injection.  Also patient was given bilateral lateral heel wedges and referred to outpatient physical therapy and we'll try and office in 3-4 months for follow-up    Large Joint Arthrocentesis  Date/Time: 7/27/2017 3:16 PM  Consent given by: patient  Site marked: site marked  Timeout: Immediately prior to procedure a time out was called to verify the correct patient, procedure, equipment, support staff and site/side marked as required   Supporting Documentation  Indications: pain and joint swelling   Procedure Details  Location: knee - L knee  Preparation: Patient was prepped and draped in the usual sterile fashion  Needle size: 18 G  Approach: anterolateral  Medications administered: 4 mL lidocaine 1 %; 80 mg methylPREDNISolone acetate 80 MG/ML; 2 mL bupivacaine

## 2017-08-11 ENCOUNTER — HOSPITAL ENCOUNTER (OUTPATIENT)
Dept: PHYSICAL THERAPY | Facility: HOSPITAL | Age: 77
Setting detail: THERAPIES SERIES
Discharge: HOME | End: 2017-08-11

## 2017-08-11 DIAGNOSIS — G89.29 CHRONIC PAIN OF BOTH KNEES: Primary | ICD-10-CM

## 2017-08-11 DIAGNOSIS — M25.562 CHRONIC PAIN OF BOTH KNEES: Primary | ICD-10-CM

## 2017-08-11 DIAGNOSIS — M25.561 CHRONIC PAIN OF BOTH KNEES: Primary | ICD-10-CM

## 2017-08-11 PROCEDURE — 97110 THERAPEUTIC EXERCISES: CPT | Performed by: PHYSICAL THERAPIST

## 2017-08-11 PROCEDURE — G8978 MOBILITY CURRENT STATUS: HCPCS | Performed by: PHYSICAL THERAPIST

## 2017-08-11 PROCEDURE — 97161 PT EVAL LOW COMPLEX 20 MIN: CPT | Performed by: PHYSICAL THERAPIST

## 2017-08-11 PROCEDURE — 97140 MANUAL THERAPY 1/> REGIONS: CPT | Performed by: PHYSICAL THERAPIST

## 2017-08-11 PROCEDURE — G8979 MOBILITY GOAL STATUS: HCPCS | Performed by: PHYSICAL THERAPIST

## 2017-08-11 NOTE — THERAPY EVALUATION
Outpatient Physical Therapy Ortho Initial Evaluation  [facility]     Patient Name: Asa Magaña  : 1940  MRN: [id number]  Today's Date: 2017      Visit Date: 2017    Patient Active Problem List   Diagnosis   • Asthma   • Hyperlipidemia   • Lumbago   • Hypothyroidism   • Hx of migraine headaches   • BPH (benign prostatic hyperplasia)   • Pyelonephritis   • CAP (community acquired pneumonia)   • Sepsis        Past Medical History:   Diagnosis Date   • Asthma    • BPH (benign prostatic hyperplasia)    • Hx of migraine headaches    • Hyperlipidemia    • Hypothyroidism    • Lumbago         Past Surgical History:   Procedure Laterality Date   • CYSTOSCOPY TRANSURETHRAL RESECTION OF PROSTATE     • CYSTOSCOPY W/ URETERAL STENT PLACEMENT Right 2017    Procedure: CYSTOSCOPY, ureteroscopy,  URETERAL STENT INSERTION, RT;  Surgeon: Shashi Arciniega MD;  Location: [facility];  Service:    • HERNIA REPAIR     • KNEE SURGERY     • THYROIDECTOMY         Visit Dx:     ICD-10-CM ICD-9-CM   1. Chronic pain of both knees M25.561 719.46    M25.562 338.29    G89.29              Patient History       17 0900          History    Chief Complaint Difficulty Walking;Difficulty with daily activities;Pain;Joint stiffness;Joint swelling;Muscle tenderness;Muscle weakness  -LC      Type of Pain Knee pain   Bilateral knee LEFT > RIGHT  -LC      Date Current Problem(s) Began 17  -      Brief Description of Current Complaint Pt reports that 3 months ago he began having increased Bilateral knee pain. He has had cortisone injections but continues to have persistent B knee pain. Pt reports occasional R knee buckling with exercises at the gym. He reports going to the gym 2-3 times a week and performing treadmill, elliptical exercises. He had xray that indicated OA in B knees. He is attending PT for conservative treatment of L knee pain. Pt reports that he has had multiple knee surgeries on both  knees, the most recent being a meniscal repair. He is attending PT at this time for prehab for probably LEFT TKA.  -LC      Previous treatment for THIS PROBLEM Injections  -LC      Onset Date- PT 8/11/17  -LC      Patient/Caregiver Goals Relieve pain;Return to prior level of function;Improve mobility;Improve strength;Know what to do to help the symptoms  -LC      Patient's Rating of General Health Very good  -LC      Occupation/sports/leisure activities part time sales, works out at gym  -      What clinical tests have you had for this problem? MRI  -      Results of Clinical Tests OA in B knees, meniscal tear  -LC      Pain     Pain Location Knee   LEFT > RIGHT  -LC      Pain at Present 5  -LC      Pain at Best 2  -LC      Pain at Worst 7  -LC      Pain Frequency Constant/continuous  -LC      Pain Description Aching;Sore  -LC      What Performance Factors Make the Current Problem(s) WORSE? prolonged standing, walking, climbing stairs, walking on uneven surface  -LC      Is your sleep disturbed? Yes  -LC      Difficulties with ADL's? pain with prolonged walking, sit to stand  -LC      Difficulties with recreational activities? knee buckling with gym exercises  -      Fall Risk Assessment    Any falls in the past year: Yes  -LC      Number of falls reported in the last 12 months 1  -LC      Factors that contributed to the fall: Lost balance  -LC      Daily Activities    Primary Language English  -      Pt Participated in POC and Goals Yes  -LC      Safety    Are you being hurt, hit, or frightened by anyone at home or in your life? No  -LC      Are you being neglected by a caregiver No  -LC        User Key  (r) = Recorded By, (t) = Taken By, (c) = Cosigned By    Initials Name Provider Type     Kasi Hagen PT DPT Physical Therapist                PT Ortho       08/11/17 0900    Left Knee    Extension/Flexion ROM Details 0-125  -LC    Right Knee    Extension/Flexion ROM Details 0-135  -LC    Left Hip     Hip Flexion Gross Movement (4-/5) good minus  -LC    Hip ABduction Gross Movement (4-/5) good minus  -LC    Hip ADduction Gross Movement (4-/5) good minus  -LC    Right Hip    Hip Flexion Gross Movement (4/5) good  -LC    Hip ABduction Gross Movement (4/5) good  -LC    Hip ADduction Gross Movement (4/5) good  -LC    Left Knee    Knee Extension Gross Movement (4-/5) good minus  -LC    Knee Flexion Gross Movement (4-/5) good minus  -LC    Right Knee    Knee Extension Gross Movement (4/5) good  -LC    Knee Flexion Gross Movement (4/5) good  -LC    Lower Extremity Flexibility    Hamstrings Bilateral:;Moderately limited  -LC      User Key  (r) = Recorded By, (t) = Taken By, (c) = Cosigned By    Initials Name Provider Type    CULLEN Hagen, PT DPT Physical Therapist                            Therapy Education       08/11/17 0939          Therapy Education    Given HEP;Symptoms/condition management;Pain management  -LC      Program New  -      How Provided Verbal;Demonstration;Written  -LC      Provided to Patient  -LC      Level of Understanding Teach back education performed;Verbalized;Demonstrated  -        User Key  (r) = Recorded By, (t) = Taken By, (c) = Cosigned By    Initials Name Provider Type    CULLEN Hagen, PT DPT Physical Therapist                PT OP Goals       08/11/17 0900       PT Short Term Goals    STG 1 Pt will be independent with HEP for strengthening L knee and improving stability in preparation for L TKA  -LC     STG 1 Progress New  -LC     STG 2 Pt will have L knee AROM 0 -130  -LC     STG 2 Progress New  -LC     STG 3 Pt will have LLE MMT grossly 4+/5  -LC     STG 3 Progress New  -LC     STG 4 Pt will report 15% improvement on KOOS to reflect improved functional ability.  -LC     STG 4 Progress New  -LC     Time Calculation    PT Goal Re-Cert Due Date 09/11/17  -LC       User Key  (r) = Recorded By, (t) = Taken By, (c) = Cosigned By    Initials Name Provider Type    CULLEN RAMOS  Hieu, PT DPT Physical Therapist                PT Assessment/Plan       08/11/17 0982       PT Assessment    Functional Limitations Limitations in functional capacity and performance;Performance in leisure activities  -     Impairments Impaired flexibility;Muscle strength;Pain;Joint integrity;Range of motion  -     Assessment Comments Pt is a 77 y.o. male who presents to PT with hx of bilateral knee pain L > R. Pt reports he has had multiple surgeries on both knees, the most recent being an arthroscopic meniscal repair on his L. 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 He has had cortisone shots which relieve his pain, but it continues to persist. Pt reports that MD has indicated need for TKA and he is attending PT for L knee prehab for TKA. He has L knee AROM 0 - 125 and painful and EROM. He has LLE MMT grossly 4-/5. He reports knee buckling with prolonged walking or standing activities. Pt was educated on HEP to perform at the gym or home to increase hip and knee strength and improve knee flexiblity. He was issued written copy of HEP.  -     Please refer to paper survey for additional self-reported information Yes  -LC     Rehab Potential Good  -LC     Patient/caregiver participated in establishment of treatment plan and goals Yes  -LC     Patient would benefit from skilled therapy intervention Yes  -LC     PT Plan    PT Frequency 1x/week  -     Predicted Duration of Therapy Intervention (days/wks) 2 weeks  -     Planned CPT's? PT EVAL LOW COMPLEXITY: 32941;PT RE-EVAL: 72798;PT NEUROMUSC RE-EDUCATION EA 15 MIN: 53518;PT MANUAL THERAPY EA 15 MIN: 72866;PT THER ACT EA 15 MIN: 99079;PT THER PROC EA 15 MIN: 42003  -     Physical Therapy Interventions (Optional Details) strengthening;stretching;ROM (Range of Motion);joint mobilization;home exercise program;patient/family education;manual therapy techniques;modalities  -     PT Plan Comments Pt requires skilled  therapy to improve functional use of LLE and to increase left knee stability and strength in preparation for L TKA  -LC       User Key  (r) = Recorded By, (t) = Taken By, (c) = Cosigned By    Initials Name Provider Type    CULLEN Hagen, PT DPT Physical Therapist                  Exercises       08/11/17 0900          Exercise 1    Exercise Name 1 see exercise flowsheet  -LC        User Key  (r) = Recorded By, (t) = Taken By, (c) = Cosigned By    Initials Name Provider Type    CULLEN Hagen, PT DPT Physical Therapist           Manual Rx (last 36 hours)      Manual Treatments       08/11/17 0900          Manual Rx 1    Manual Rx 1 Location L knee  -LC      Manual Rx 1 Type hamstring stretch  -LC      Manual Rx 1 Duration 10 min  -        User Key  (r) = Recorded By, (t) = Taken By, (c) = Cosigned By    Initials Name Provider Type    CULLEN Hagen, PT DPT Physical Therapist                            Outcome Measures       08/11/17 0800          Knee Outcome Score    Knee Outcome Score Comments 51%  -LC      Functional Assessment    Outcome Measure Options Knee Outcome Score- ADL  -        User Key  (r) = Recorded By, (t) = Taken By, (c) = Cosigned By    Initials Name Provider Type    CULLEN Hagen, PT DPT Physical Therapist            Time Calculation:   Start Time: 0900  Stop Time: 0940  Time Calculation (min): 40 min     Therapy Charges for Today     Code Description Service Date Service Provider Modifiers Qty    44100134336 HC PT MOBILITY CURRENT 8/11/2017 Kasi Hagen PT DPT GP, CK 1    32077609368 HC PT MOBILITY PROJECTED 8/11/2017 Kasi Hagen PT DPT GP, CJ 1    31789262779 HC PT EVAL LOW COMPLEXITY 1 8/11/2017 Kasi Hagen, PT DPT GP 1    10348504478 HC PT THER PROC EA 15 MIN 8/11/2017 Kasi Hagen PT DPT GP 1    27232688149 HC PT MANUAL THERAPY EA 15 MIN 8/11/2017 Kasi Hagen, PT DPT GP 1          PT G-Codes  PT Professional Judgement Used?: Yes  Outcome Measure Options: Knee  Outcome Score- ADL  Score: 51%  Functional Limitation: Mobility: Walking and moving around  Mobility: Walking and Moving Around Current Status (): At least 40 percent but less than 60 percent impaired, limited or restricted  Mobility: Walking and Moving Around Goal Status (): At least 20 percent but less than 40 percent impaired, limited or restricted         Kasi Hagen, PT DPT  8/11/2017

## 2017-08-25 ENCOUNTER — HOSPITAL ENCOUNTER (OUTPATIENT)
Dept: PHYSICAL THERAPY | Facility: HOSPITAL | Age: 77
Setting detail: THERAPIES SERIES
Discharge: HOME | End: 2017-08-25

## 2017-08-25 DIAGNOSIS — M25.561 CHRONIC PAIN OF BOTH KNEES: Primary | ICD-10-CM

## 2017-08-25 DIAGNOSIS — M25.562 CHRONIC PAIN OF BOTH KNEES: Primary | ICD-10-CM

## 2017-08-25 DIAGNOSIS — G89.29 CHRONIC PAIN OF BOTH KNEES: Primary | ICD-10-CM

## 2017-08-25 PROCEDURE — 97110 THERAPEUTIC EXERCISES: CPT | Performed by: PHYSICAL THERAPIST

## 2017-08-25 NOTE — THERAPY DISCHARGE NOTE
Outpatient Physical Therapy Ortho Treatment Note/Discharge Summary  [facility]     Patient Name: Asa Magaña  : 1940  MRN: [id number]  Today's Date: 2017      Visit Date: 2017    Visit Dx:    ICD-10-CM ICD-9-CM   1. Chronic pain of both knees M25.561 719.46    M25.562 338.29    G89.29        Patient Active Problem List   Diagnosis   • Asthma   • Hyperlipidemia   • Lumbago   • Hypothyroidism   • Hx of migraine headaches   • BPH (benign prostatic hyperplasia)   • Pyelonephritis   • CAP (community acquired pneumonia)   • Sepsis        Past Medical History:   Diagnosis Date   • Asthma    • BPH (benign prostatic hyperplasia)    • Hx of migraine headaches    • Hyperlipidemia    • Hypothyroidism    • Lumbago         Past Surgical History:   Procedure Laterality Date   • CYSTOSCOPY TRANSURETHRAL RESECTION OF PROSTATE     • CYSTOSCOPY W/ URETERAL STENT PLACEMENT Right 2017    Procedure: CYSTOSCOPY, ureteroscopy,  URETERAL STENT INSERTION, RT;  Surgeon: Shashi Arciniega MD;  Location: [facility];  Service:    • HERNIA REPAIR     • KNEE SURGERY     • THYROIDECTOMY               PT Ortho       17 1100    Subjective Comments    Subjective Comments Pt reports independence with HEP. He still has knee pain 4/10, but this is continuing to improve. He can tell that he is much stronger. He is very comfortable performing therex at home independently and is comfortable D/Cing from therapy.  -LC    Left Hip    Hip Flexion Gross Movement (4+/5) good plus  -LC    Hip ABduction Gross Movement (4+/5) good plus  -LC    Hip ADduction Gross Movement (4+/5) good plus  -LC    Right Hip    Hip Flexion Gross Movement (4+/5) good plus  -LC    Hip ABduction Gross Movement (4+/5) good plus  -LC    Hip ADduction Gross Movement (4+/5) good plus  -LC    Left Knee    Knee Extension Gross Movement (4+/5) good plus  -LC    Knee Flexion Gross Movement (4+/5) good plus  -LC    Right Knee    Knee Extension  Gross Movement (4+/5) good plus  -    Knee Flexion Gross Movement (4+/5) good plus  -LC    Lower Extremity Flexibility    Hamstrings Bilateral:;Mildly limited  -      User Key  (r) = Recorded By, (t) = Taken By, (c) = Cosigned By    Initials Name Provider Type    CULLEN Hagen PT DPT Physical Therapist                            PT Assessment/Plan       08/25/17 1155       PT Assessment    Functional Limitations Limitations in functional capacity and performance;Performance in leisure activities  -     Impairments Impaired flexibility;Muscle strength;Pain;Joint integrity;Range of motion  -     Assessment Comments Pt has B knee MMT grossly 4+/5. Pt is independent with HEP at home. He is very pleased with the progress he has made. He was educated on advanced B knee strenghthening therex today. He reports pain is 4/10, but this is improving and he is able to perform ADL's with greater endurance and decreased pain at this time. Pt was issued written copy of progressed HEP.  -     PT Plan    PT Plan Comments Pt will be D/C to independence with HEP.  -       User Key  (r) = Recorded By, (t) = Taken By, (c) = Cosigned By    Initials Name Provider Type    CULLEN Hagen, PT DPT Physical Therapist                    Exercises       08/25/17 1100          Subjective Comments    Subjective Comments Pt reports independence with HEP. He still has knee pain 4/10, but this is continuing to improve. He can tell that he is much stronger. He is very comfortable performing therex at home independently and is comfortable D/Cing from therapy.  -      Exercise 1    Exercise Name 1 see exercise flowsheet  -        User Key  (r) = Recorded By, (t) = Taken By, (c) = Cosigned By    Initials Name Provider Type    CULLEN Hagen, PT DPT Physical Therapist                               PT OP Goals       08/25/17 1100       PT Short Term Goals    STG 1 Pt will be independent with HEP for strengthening L knee and  improving stability in preparation for L TKA  -LC     STG 1 Progress Met  -LC     STG 2 Pt will have L knee AROM 0 -130  -LC     STG 2 Progress Met  -LC     STG 3 Pt will have LLE MMT grossly 4+/5  -LC     STG 3 Progress Met  -LC     STG 4 Pt will report 15% improvement on KOOS to reflect improved functional ability.  -LC     STG 4 Progress Not Met  -LC       User Key  (r) = Recorded By, (t) = Taken By, (c) = Cosigned By    Initials Name Provider Type    CULLEN Hagen, PT DPT Physical Therapist                Therapy Education       08/25/17 1154          Therapy Education    Given HEP;Symptoms/condition management;Pain management  -LC      Program Progressed  -LC      How Provided Verbal;Demonstration;Written  -LC      Provided to Patient  -LC      Level of Understanding Teach back education performed;Verbalized;Demonstrated  -LC        User Key  (r) = Recorded By, (t) = Taken By, (c) = Cosigned By    Initials Name Provider Type    CULLEN Hagen, PT DPT Physical Therapist                Time Calculation:   Start Time: 1115  Stop Time: 1140  Time Calculation (min): 25 min  Total Timed Code Minutes- PT: 25 minute(s)    Therapy Charges for Today     Code Description Service Date Service Provider Modifiers Qty    50536952320 HC PT THER PROC EA 15 MIN 8/25/2017 Kasi Hagen, PT DPT GP 2                OP PT Discharge Summary  Date of Discharge: 08/25/17  Reason for Discharge: Maximum functional potential achieved, Independent  Outcomes Achieved: Patient able to partially acheive established goals, Refer to plan of care for updates on goals achieved  Discharge Destination: Home with home program  Discharge Instructions: Pt instructed to contact PT if any change in status or any questions regarding HEP.      Kasi Hagen PT DPT  8/25/2017

## 2017-10-31 ENCOUNTER — OFFICE VISIT (OUTPATIENT)
Dept: ORTHOPEDIC SURGERY | Facility: CLINIC | Age: 77
End: 2017-10-31

## 2017-10-31 VITALS — WEIGHT: 213 LBS | HEIGHT: 71 IN | BODY MASS INDEX: 29.82 KG/M2 | TEMPERATURE: 98.7 F

## 2017-10-31 DIAGNOSIS — M17.12 PRIMARY OSTEOARTHRITIS OF LEFT KNEE: Primary | ICD-10-CM

## 2017-10-31 PROCEDURE — 20610 DRAIN/INJ JOINT/BURSA W/O US: CPT | Performed by: ORTHOPAEDIC SURGERY

## 2017-10-31 RX ORDER — METHYLPREDNISOLONE ACETATE 80 MG/ML
80 INJECTION, SUSPENSION INTRA-ARTICULAR; INTRALESIONAL; INTRAMUSCULAR; SOFT TISSUE
Status: COMPLETED | OUTPATIENT
Start: 2017-10-31 | End: 2017-10-31

## 2017-10-31 RX ORDER — LEVOTHYROXINE SODIUM 0.07 MG/1
TABLET ORAL
Refills: 3 | COMMUNITY
Start: 2017-10-23 | End: 2018-08-16

## 2017-10-31 RX ORDER — LIDOCAINE HYDROCHLORIDE 10 MG/ML
4 INJECTION, SOLUTION INFILTRATION; PERINEURAL
Status: COMPLETED | OUTPATIENT
Start: 2017-10-31 | End: 2017-10-31

## 2017-10-31 RX ADMIN — LIDOCAINE HYDROCHLORIDE 4 ML: 10 INJECTION, SOLUTION INFILTRATION; PERINEURAL at 22:37

## 2017-10-31 RX ADMIN — METHYLPREDNISOLONE ACETATE 80 MG: 80 INJECTION, SUSPENSION INTRA-ARTICULAR; INTRALESIONAL; INTRAMUSCULAR; SOFT TISSUE at 22:37

## 2018-04-13 ENCOUNTER — HOSPITAL ENCOUNTER (EMERGENCY)
Facility: HOSPITAL | Age: 78
Discharge: HOME | End: 2018-04-13
Attending: EMERGENCY MEDICINE | Admitting: EMERGENCY MEDICINE

## 2018-04-13 VITALS
OXYGEN SATURATION: 96 % | DIASTOLIC BLOOD PRESSURE: 86 MMHG | WEIGHT: 215 LBS | SYSTOLIC BLOOD PRESSURE: 140 MMHG | TEMPERATURE: 97.2 F | BODY MASS INDEX: 29.12 KG/M2 | HEIGHT: 72 IN | RESPIRATION RATE: 16 BRPM | HEART RATE: 60 BPM

## 2018-04-13 DIAGNOSIS — K62.5 BRIGHT RED RECTAL BLEEDING: Primary | ICD-10-CM

## 2018-04-13 LAB
ALBUMIN SERPL-MCNC: 3.8 G/DL (ref 3.5–5.2)
ALBUMIN/GLOB SERPL: 1.5 G/DL
ALP SERPL-CCNC: 62 U/L (ref 39–117)
ALT SERPL W P-5'-P-CCNC: 21 U/L (ref 1–41)
ANION GAP SERPL CALCULATED.3IONS-SCNC: 7.6 MMOL/L
AST SERPL-CCNC: 21 U/L (ref 1–40)
BASOPHILS # BLD AUTO: 0.05 10*3/MM3 (ref 0–0.2)
BASOPHILS NFR BLD AUTO: 0.8 % (ref 0–1.5)
BILIRUB SERPL-MCNC: 0.4 MG/DL (ref 0.1–1.2)
BUN BLD-MCNC: 21 MG/DL (ref 8–23)
BUN/CREAT SERPL: 16.4 (ref 7–25)
CALCIUM SPEC-SCNC: 8.5 MG/DL (ref 8.6–10.5)
CHLORIDE SERPL-SCNC: 106 MMOL/L (ref 98–107)
CO2 SERPL-SCNC: 27.4 MMOL/L (ref 22–29)
CREAT BLD-MCNC: 1.28 MG/DL (ref 0.76–1.27)
DEPRECATED RDW RBC AUTO: 44.2 FL (ref 37–54)
EOSINOPHIL # BLD AUTO: 0.1 10*3/MM3 (ref 0–0.7)
EOSINOPHIL NFR BLD AUTO: 1.5 % (ref 0.3–6.2)
ERYTHROCYTE [DISTWIDTH] IN BLOOD BY AUTOMATED COUNT: 12.9 % (ref 11.5–14.5)
GFR SERPL CREATININE-BSD FRML MDRD: 55 ML/MIN/1.73
GLOBULIN UR ELPH-MCNC: 2.6 GM/DL
GLUCOSE BLD-MCNC: 105 MG/DL (ref 65–99)
HCT VFR BLD AUTO: 46.8 % (ref 40.4–52.2)
HGB BLD-MCNC: 15 G/DL (ref 13.7–17.6)
HOLD SPECIMEN: NORMAL
IMM GRANULOCYTES # BLD: 0.06 10*3/MM3 (ref 0–0.03)
IMM GRANULOCYTES NFR BLD: 0.9 % (ref 0–0.5)
INR PPP: 1.06 (ref 0.9–1.1)
LYMPHOCYTES # BLD AUTO: 1.5 10*3/MM3 (ref 0.9–4.8)
LYMPHOCYTES NFR BLD AUTO: 22.6 % (ref 19.6–45.3)
MCH RBC QN AUTO: 30.6 PG (ref 27–32.7)
MCHC RBC AUTO-ENTMCNC: 32.1 G/DL (ref 32.6–36.4)
MCV RBC AUTO: 95.5 FL (ref 79.8–96.2)
MONOCYTES # BLD AUTO: 0.87 10*3/MM3 (ref 0.2–1.2)
MONOCYTES NFR BLD AUTO: 13.1 % (ref 5–12)
NEUTROPHILS # BLD AUTO: 4.06 10*3/MM3 (ref 1.9–8.1)
NEUTROPHILS NFR BLD AUTO: 61.1 % (ref 42.7–76)
PLATELET # BLD AUTO: 181 10*3/MM3 (ref 140–500)
PMV BLD AUTO: 10.5 FL (ref 6–12)
POTASSIUM BLD-SCNC: 4.6 MMOL/L (ref 3.5–5.2)
PROT SERPL-MCNC: 6.4 G/DL (ref 6–8.5)
PROTHROMBIN TIME: 13.6 SECONDS (ref 11.7–14.2)
RBC # BLD AUTO: 4.9 10*6/MM3 (ref 4.6–6)
SODIUM BLD-SCNC: 141 MMOL/L (ref 136–145)
WBC NRBC COR # BLD: 6.64 10*3/MM3 (ref 4.5–10.7)

## 2018-04-13 PROCEDURE — 99283 EMERGENCY DEPT VISIT LOW MDM: CPT

## 2018-04-13 PROCEDURE — 85610 PROTHROMBIN TIME: CPT | Performed by: EMERGENCY MEDICINE

## 2018-04-13 PROCEDURE — 85025 COMPLETE CBC W/AUTO DIFF WBC: CPT | Performed by: EMERGENCY MEDICINE

## 2018-04-13 PROCEDURE — 80053 COMPREHEN METABOLIC PANEL: CPT | Performed by: EMERGENCY MEDICINE

## 2018-04-13 RX ORDER — SODIUM CHLORIDE 0.9 % (FLUSH) 0.9 %
10 SYRINGE (ML) INJECTION AS NEEDED
Status: DISCONTINUED | OUTPATIENT
Start: 2018-04-13 | End: 2018-04-13 | Stop reason: HOSPADM

## 2018-04-13 NOTE — ED TRIAGE NOTES
Rectal bleeding x couple days, told to come to ER by PCP. Bright red bleeding when wipe. Has had this in the past with hemorrhoids. More blood than when this happened in the past.

## 2018-04-13 NOTE — ED PROVIDER NOTES
" EMERGENCY DEPARTMENT ENCOUNTER    CHIEF COMPLAINT  Chief Complaint: Rectal bleeding  History given by: Pt  History limited by: nothing  Room Number: 34/34  PMD: Michele Rivero MD      HPI:  Pt is a 77 y.o. male who presents with a hx of hemorrhoids, hypothyroidism, hyperlipidemia, and asthma, complaining of worsening rectal bleeding which began two days ago. Pt states that there is \"bright red\" blood when he wipes after having a BM. Pt's bleeding stopped after wiping. Pt's last BM was this morning and one BM yesterday.  The only 2 times that he bled, which was mild, was after a BM.  Pt denies rectal pain denies N/V, weakness, fever, CP, SOA, and fatigue. Pt states that he believes that he has hemorrhoids due to be similar to his previous episode. Pt was instructed to come to come to ED by Dr. Rivero, PCP, for further evaluation.    Duration:  Two days  Onset: gradual  Timing: intermittent  Location: rectum  Quality: \"bright red\"  Intensity/Severity: moderate  Progression: worsening  Associated Symptoms: rectal pain  Aggravating Factors: BM  Alleviating Factors: none  Previous Episodes: Pt has a hx of hemorrhoids that feels similar to a previous episode.   Treatment before arrival: Nothing. Pt was instructed to come to come to ED by Dr. Rivero, PCP, for further evaluation.    PAST MEDICAL HISTORY  Active Ambulatory Problems     Diagnosis Date Noted   • Asthma    • Hyperlipidemia    • Lumbago    • Hypothyroidism    • Hx of migraine headaches    • BPH (benign prostatic hyperplasia)    • Pyelonephritis 01/20/2017   • CAP (community acquired pneumonia) 03/08/2017   • Sepsis 03/08/2017     Resolved Ambulatory Problems     Diagnosis Date Noted   • No Resolved Ambulatory Problems     Past Medical History:   Diagnosis Date   • Asthma    • BPH (benign prostatic hyperplasia)    • Hx of migraine headaches    • Hyperlipidemia    • Hypothyroidism    • Lumbago        PAST SURGICAL HISTORY  Past Surgical History: " "  Procedure Laterality Date   • CYSTOSCOPY TRANSURETHRAL RESECTION OF PROSTATE     • CYSTOSCOPY W/ URETERAL STENT PLACEMENT Right 1/20/2017    Procedure: CYSTOSCOPY, ureteroscopy,  URETERAL STENT INSERTION, RT;  Surgeon: Shashi Arciniega MD;  Location: [facility] MAIN OR;  Service:    • HERNIA REPAIR     • KNEE SURGERY     • THYROIDECTOMY         FAMILY HISTORY  Family History   Problem Relation Age of Onset   • Diabetes Mother    • Heart disease Mother    • Diabetes Father    • Heart disease Father        SOCIAL HISTORY  Social History     Social History   • Marital status:      Spouse name: N/A   • Number of children: N/A   • Years of education: N/A     Occupational History   • Not on file.     Social History Main Topics   • Smoking status: Never Smoker   • Smokeless tobacco: Never Used   • Alcohol use No   • Drug use: No   • Sexual activity: Defer     Other Topics Concern   • Not on file     Social History Narrative   • No narrative on file       ALLERGIES  Ciprofloxacin and Quinolones    REVIEW OF SYSTEMS  Review of Systems   Constitutional: Negative for activity change, appetite change, fatigue and fever.   HENT: Negative for congestion and sore throat.    Eyes: Negative.    Respiratory: Negative for cough and shortness of breath.    Cardiovascular: Negative for chest pain and leg swelling.   Gastrointestinal: Positive for anal bleeding (\"bright red\") and rectal pain. Negative for abdominal pain, diarrhea and vomiting.   Endocrine: Negative.    Genitourinary: Negative for decreased urine volume and dysuria.   Musculoskeletal: Negative for neck pain.   Skin: Negative for rash and wound.   Allergic/Immunologic: Negative.    Neurological: Negative for weakness, numbness and headaches.   Hematological: Negative.    Psychiatric/Behavioral: Negative.    All other systems reviewed and are negative.      PHYSICAL EXAM  ED Triage Vitals [04/13/18 0841]   Temp Heart Rate Resp BP SpO2   97.2 °F (36.2 °C) 79 18 -- " "96 %      Temp src Heart Rate Source Patient Position BP Location FiO2 (%)   Tympanic Monitor -- -- --       Physical Exam   Constitutional: He is oriented to person, place, and time and well-developed, well-nourished, and in no distress.   HENT:   Head: Normocephalic and atraumatic.   Eyes: EOM are normal. Pupils are equal, round, and reactive to light.   Neck: Normal range of motion. Neck supple.   Cardiovascular: Normal rate, regular rhythm and normal heart sounds.    Pulmonary/Chest: Effort normal and breath sounds normal. No respiratory distress.   Abdominal: Soft. There is no tenderness. There is no rebound and no guarding.   Genitourinary: Rectal exam shows guaiac positive stool (\"bright red\" with brown stool). Rectal exam shows no external hemorrhoid.   Musculoskeletal: Normal range of motion. He exhibits no edema.   Neurological: He is alert and oriented to person, place, and time. He has normal sensation and normal strength.   Skin: Skin is warm and dry.   Psychiatric: Mood and affect normal.   Nursing note and vitals reviewed.      LAB RESULTS  Lab Results (last 24 hours)     Procedure Component Value Units Date/Time    CBC & Differential [[id number]] Collected:  04/13/18 0931    Specimen:  Blood Updated:  04/13/18 0942    Narrative:       The following orders were created for panel order CBC & Differential.  Procedure                               Abnormality         Status                     ---------                               -----------         ------                     CBC Auto Differential[[id number]]        Abnormal            Final result                 Please view results for these tests on the individual orders.    Comprehensive Metabolic Panel [[id number]]  (Abnormal) Collected:  04/13/18 0931    Specimen:  Blood Updated:  04/13/18 1009     Glucose 105 (H) mg/dL      BUN 21 mg/dL      Creatinine 1.28 (H) mg/dL      Sodium 141 mmol/L      Potassium 4.6 mmol/L      Chloride 106 mmol/L      " CO2 27.4 mmol/L      Calcium 8.5 (L) mg/dL      Total Protein 6.4 g/dL      Albumin 3.80 g/dL      ALT (SGPT) 21 U/L      AST (SGOT) 21 U/L      Alkaline Phosphatase 62 U/L      Total Bilirubin 0.4 mg/dL      eGFR Non African Amer 55 (L) mL/min/1.73      Globulin 2.6 gm/dL      A/G Ratio 1.5 g/dL      BUN/Creatinine Ratio 16.4     Anion Gap 7.6 mmol/L     Narrative:       The MDRD GFR formula is only valid for adults with stable renal function between ages 18 and 70.    Protime-INR [[id number]]  (Normal) Collected:  04/13/18 0931    Specimen:  Blood Updated:  04/13/18 1004     Protime 13.6 Seconds      INR 1.06    CBC Auto Differential [[id number]]  (Abnormal) Collected:  04/13/18 0931    Specimen:  Blood Updated:  04/13/18 0942     WBC 6.64 10*3/mm3      RBC 4.90 10*6/mm3      Hemoglobin 15.0 g/dL      Hematocrit 46.8 %      MCV 95.5 fL      MCH 30.6 pg      MCHC 32.1 (L) g/dL      RDW 12.9 %      RDW-SD 44.2 fl      MPV 10.5 fL      Platelets 181 10*3/mm3      Neutrophil % 61.1 %      Lymphocyte % 22.6 %      Monocyte % 13.1 (H) %      Eosinophil % 1.5 %      Basophil % 0.8 %      Immature Grans % 0.9 (H) %      Neutrophils, Absolute 4.06 10*3/mm3      Lymphocytes, Absolute 1.50 10*3/mm3      Monocytes, Absolute 0.87 10*3/mm3      Eosinophils, Absolute 0.10 10*3/mm3      Basophils, Absolute 0.05 10*3/mm3      Immature Grans, Absolute 0.06 (H) 10*3/mm3         I ordered the above labs and reviewed the results    PROCEDURES  Procedures      PROGRESS AND CONSULTS  ED Course   Value Comment By Time   Hemoglobin: 15.0 Previous hemoglobin was 11.8 1 year ago Adam Carrera MD 04/13 1020   Creatinine: (!) 1.28 Previous creatinine was 1  1 year ago Adam Carrera MD 04/13 1021     0852:  Ordered blood work and protime-INR for further evaluation.     0921:  Ordered rainbow draw for further evaluation.     1038:  Rectal exam performed. On exam, theres no external hemorrhoids. Notified pt of plan to consult with GI for further  assessment. Pt understands and agrees with the plan, all questions answered.    1042:  Placed call to GI for consult.    1108:  Discussed pt's case with YOANDY Zavala, who states to discharge pt home with suppositories.     1115:  Pt rechecked. Pt resting comfortably. Notified pt that he may have an internal hemorrhoid. Instructed pt to f/u with GI. RTER warning given if bleedings worsens. Pt understands and agrees with the plan, all questions answered.    MEDICAL DECISION MAKING  Results were reviewed/discussed with the patient and they were also made aware of online access. Pt also made aware that some labs, such as cultures, will not be resulted during ER visit and follow up with PMD is necessary.     MDM  Number of Diagnoses or Management Options     Amount and/or Complexity of Data Reviewed  Clinical lab tests: ordered and reviewed (Hemoglobin= 15.0)  Decide to obtain previous medical records or to obtain history from someone other than the patient: yes  Discuss the patient with other providers: yes (YOANDY Zavala [facility])  Independent visualization of images, tracings, or specimens: yes    Patient Progress  Patient progress: stable         DIAGNOSIS  Final diagnoses:   Bright red rectal bleeding       DISPOSITION  DISCHARGE    Patient discharged in stable condition.    Reviewed implications of results, diagnosis, meds, responsibility to follow up, warning signs and symptoms of possible worsening, potential complications and reasons to return to ER, including worsening rectal bleeding.    Patient/Family voiced understanding of above instructions.    Discussed plan for discharge, as there is no emergent indication for admission. Patient referred to primary care provider for BP management due to today's BP. Pt/family is agreeable and understands need for follow up and repeat testing.  Pt is aware that discharge does not mean that nothing is wrong but it indicates no emergency is present that requires admission and they must  continue care with follow-up as given below or physician of their choice.     FOLLOW-UP  Tam Orellana MD  [id number]  [place] [address]  [phone]    Go on 4/24/2018  For further evaluation         Medication List      No changes were made to your prescriptions during this visit.       Latest Documented Vital Signs:  As of 10:55 AM  BP- 140/86 HR- 60 Temp- 97.2 °F (36.2 °C) (Tympanic) O2 sat- 95%  --    The KADY and I have discussed this patient's history, physical exam, and treatment plan.  I have reviewed the documentation and personally had a face to face interaction with the patient. I affirm the documentation and agree with the treatment and plan.  The attached note describes my personal findings.    Documentation assistance provided by alirio Cavazos for Adam Carrera MD.  Information recorded by the scrshana was done at my direction and has been verified and validated by me.         Florian Cavazos  04/13/18 1121       Florian Cavazos  04/13/18 1124       Adam Carrera MD  04/13/18 8402

## 2018-04-13 NOTE — DISCHARGE INSTRUCTIONS
Gastrointestinal Bleeding  Gastrointestinal (GI) bleeding is bleeding somewhere along the digestive tract, between the mouth and anus. This can be caused by various problems. The severity of these problems can range from mild to serious or even life-threatening. If you have GI bleeding, you may find blood in your stools (feces), you may have black stools, or you may vomit blood. If there is a lot of bleeding, you may need to stay in the hospital.  What are the causes?  This condition may be caused by:  · Esophagitis. This is inflammation, irritation, or swelling of the esophagus.  · Hemorrhoids. These are swollen veins in the rectum.  · Anal fissures. These are areas of painful tearing that are often caused by passing hard stool.  · Diverticulosis. These are pouches that form on the colon over time, with age, and may bleed a lot.  · Diverticulitis. This is inflammation in areas with diverticulosis. It can cause pain, fever, and bloody stools, although bleeding may be mild.  · Polyps and cancer. Colon cancer often starts out as precancerous polyps.  · Gastritis and ulcers. With these, bleeding may come from the upper GI tract, near the stomach.  What are the signs or symptoms?  Symptoms of this condition may include:  · Bright red blood in your vomit, or vomit that looks like coffee grounds.  · Bloody, black, or tarry stools.  ¨ Bleeding from the lower GI tract will usually cause red or maroon blood in the stools.  ¨ Bleeding from the upper GI tract may cause black, tarry, often bad-smelling stools.  ¨ In certain cases, if the bleeding is fast enough, the stools may be red.  · Pain or cramping in the abdomen.  How is this diagnosed?  This condition may be diagnosed based on:  · Medical history and physical exam.  · Various tests, such as:  ¨ Blood tests.  ¨ X-rays and other imaging tests.  ¨ Esophagogastroduodenoscopy (EGD). In this test, a flexible, lighted tube is used to look at your esophagus, stomach, and small  intestine.  ¨ Colonoscopy. In this test, a flexible, lighted tube is used to look at your colon.  How is this treated?  Treatment for this condition depends on the cause of the bleeding. For example:  · For bleeding from the esophagus, stomach, small intestine, or colon, the health care provider doing your EGD or colonoscopy may be able to stop the bleeding as part of the procedure.  · Inflammation or infection of the colon can be treated with medicines.  · Certain rectal problems can be treated with creams, suppositories, or warm baths.  · Surgery is sometimes needed.  · Blood transfusions are sometimes needed if a lot of blood has been lost.  If bleeding is slow, you may be allowed to go home. If there is a lot of bleeding, you will need to stay in the hospital for observation.  Follow these instructions at home:  · Take over-the-counter and prescription medicines only as told by your health care provider.  · Eat foods that are high in fiber. This will help to keep your stools soft. These foods include whole grains, legumes, fruits, and vegetables. Eating 1-3 prunes each day works well for many people.  · Drink enough fluid to keep your urine clear or pale yellow.  · Keep all follow-up visits as told by your health care provider. This is important.  Contact a health care provider if:  · Your symptoms do not improve.  Get help right away if:  · Your bleeding increases.  · You feel light-headed or you faint.  · You feel weak.  · You have severe cramps in your back or abdomen.  · You pass large blood clots in your stool.  · Your symptoms are getting worse.  This information is not intended to replace advice given to you by your health care provider. Make sure you discuss any questions you have with your health care provider.  Document Released: 12/15/2001 Document Revised: 05/17/2017 Document Reviewed: 06/06/2016  Elsevier Interactive Patient Education © 2017 [employer]

## 2018-04-24 ENCOUNTER — OFFICE VISIT (OUTPATIENT)
Dept: GASTROENTEROLOGY | Facility: CLINIC | Age: 78
End: 2018-04-24

## 2018-04-24 VITALS
HEIGHT: 72 IN | DIASTOLIC BLOOD PRESSURE: 80 MMHG | SYSTOLIC BLOOD PRESSURE: 134 MMHG | BODY MASS INDEX: 29.66 KG/M2 | WEIGHT: 219 LBS | TEMPERATURE: 98.4 F

## 2018-04-24 DIAGNOSIS — K62.5 RECTAL BLEEDING: Primary | ICD-10-CM

## 2018-04-24 PROCEDURE — 99204 OFFICE O/P NEW MOD 45 MIN: CPT | Performed by: INTERNAL MEDICINE

## 2018-04-24 NOTE — PROGRESS NOTES
Chief Complaint   Patient presents with   • Rectal Bleeding     Asa Magaña is a 77 y.o. male who presents with a History of rectal bleeding   HPI     Patient is a 77-year-old male with history of asthma, BPH, hyperlipidemia and hypothyroidism who presented to the emergency room with rectal bleeding.  Patient denied any abdominal pain no nausea vomiting no diarrhea and no rectal pain with the above complaints.  Patient was found in the ER with normal hemoglobin and was sent home with a referral to follow-up with us.  Patient has undergone screening colonoscopy in 2016 by Dr. Filemon Beasley and was reportedly told the colon was normal.  Patient denies fever chills no chest pain or palpitations no myalgias or arthralgias no dysuria polyuria no skin rashes or skin lesions.  Patient here for further recommendations.  Patient was sent home on cortisone suppositories and has done well without complaints.    Past Medical History:   Diagnosis Date   • Asthma    • BPH (benign prostatic hyperplasia)    • Hx of migraine headaches    • Hyperlipidemia    • Hypothyroidism    • Lumbago    • Pneumonia    • Sepsis     bladder infection and pneumonia  x 2       Current Outpatient Prescriptions:   •  acetaminophen (TYLENOL) 325 MG tablet, Take 2 tablets by mouth Every 4 (Four) Hours As Needed for Mild Pain (1-3)., Disp: , Rfl: 0  •  finasteride (PROSCAR) 5 MG tablet, Take 5 mg by mouth Daily., Disp: , Rfl:   •  levothyroxine (SYNTHROID, LEVOTHROID) 75 MCG tablet, TK 1 T PO ONCE D, Disp: , Rfl: 3  •  rosuvastatin (CRESTOR) 5 MG tablet, TK 1 T PO D, Disp: , Rfl: 3  •  simvastatin (ZOCOR) 20 MG tablet, Take 20 mg by mouth Every Night., Disp: , Rfl:   •  SUMAtriptan (IMITREX) 50 MG tablet, Take one tablet at onset of headache. May repeat dose one time in 2 hours if headache not relieved., Disp: , Rfl: 0  •  hydrocortisone (ANUSOL-HC) 2.5 % rectal cream, Insert  into the rectum 2 (Two) Times a Day., Disp: 10 g, Rfl: 0  •  levothyroxine  (SYNTHROID, LEVOTHROID) 88 MCG tablet, Take 0.175 mcg by mouth., Disp: , Rfl:   Allergies   Allergen Reactions   • Ciprofloxacin Hives and Palpitations   • Quinolones Hives and Palpitations     Social History     Social History   • Marital status:      Spouse name: N/A   • Number of children: N/A   • Years of education: N/A     Occupational History   • Not on file.     Social History Main Topics   • Smoking status: Never Smoker   • Smokeless tobacco: Never Used   • Alcohol use Yes      Comment: seldom   • Drug use: No   • Sexual activity: Defer     Other Topics Concern   • Not on file     Social History Narrative   • No narrative on file     Family History   Problem Relation Age of Onset   • Diabetes Mother    • Heart disease Mother    • Diabetes Father    • Heart disease Father      Review of Systems   Constitutional: Negative.    HENT: Negative.    Eyes: Negative.    Respiratory: Negative.    Cardiovascular: Negative.    Gastrointestinal: Negative.    Endocrine: Negative.    Musculoskeletal: Negative.    Skin: Negative.    Allergic/Immunologic: Negative.    Hematological: Negative.      Vitals:    04/24/18 0857   BP: 134/80   Temp: 98.4 °F (36.9 °C)     Physical Exam   Constitutional: He is oriented to person, place, and time. He appears well-developed and well-nourished.   HENT:   Head: Normocephalic and atraumatic.   Eyes: EOM are normal. Pupils are equal, round, and reactive to light. No scleral icterus.   Neck: Normal range of motion. No tracheal deviation present.   Cardiovascular: Normal rate and regular rhythm.  Exam reveals no gallop and no friction rub.    No murmur heard.  Pulmonary/Chest: Effort normal and breath sounds normal. No respiratory distress. He has no wheezes. He has no rales.   Abdominal: Soft. Bowel sounds are normal. He exhibits no distension and no mass. There is no tenderness. There is no rebound and no guarding.   Musculoskeletal: Normal range of motion. He exhibits no edema.    Neurological: He is alert and oriented to person, place, and time. No cranial nerve deficit.   Skin: Skin is warm and dry. No rash noted.   Psychiatric: He has a normal mood and affect. His behavior is normal.   Nursing note and vitals reviewed.    Diagnoses and all orders for this visit:    Rectal bleeding  -     Case Request; Standing  -     Case Request    Other orders  -     Implement Anesthesia orders day of procedure.; Standing  -     Obtain informed consent; Standing    Patient 77-year-old male with history of asthma, hyperlipidemia, hypothyroidism presenting with episode of rectal bleeding.  Patient reports one other episode number of years ago but otherwise has been doing well.  Patient seen emergency room with normal hemoglobin and sent here for follow-up.  Patient does have a history of a negative colonoscopy in 2016, results are currently not available for review but patient said it was normal.  Patient instructed to follow-up in 5 years.  At this point would recommend flexible sigmoidoscopy to evaluate the left colon for possible sources of the bleeding.  We'll also assess if hemorrhoids are the offending agent whether surgical or nonsurgical treatment choice would be best.  Patient reports no further bleeding since the ER.  We'll follow-up clinically based on findings for further recommendations.

## 2018-06-12 ENCOUNTER — OFFICE VISIT (OUTPATIENT)
Dept: ORTHOPEDIC SURGERY | Facility: CLINIC | Age: 78
End: 2018-06-12

## 2018-06-12 VITALS — WEIGHT: 220 LBS | BODY MASS INDEX: 29.8 KG/M2 | TEMPERATURE: 97.7 F | HEIGHT: 72 IN

## 2018-06-12 DIAGNOSIS — M25.562 CHRONIC PAIN OF BOTH KNEES: Primary | ICD-10-CM

## 2018-06-12 DIAGNOSIS — G89.29 CHRONIC PAIN OF BOTH KNEES: Primary | ICD-10-CM

## 2018-06-12 DIAGNOSIS — M25.561 CHRONIC PAIN OF BOTH KNEES: Primary | ICD-10-CM

## 2018-06-12 PROCEDURE — 73562 X-RAY EXAM OF KNEE 3: CPT | Performed by: NURSE PRACTITIONER

## 2018-06-12 PROCEDURE — 99213 OFFICE O/P EST LOW 20 MIN: CPT | Performed by: NURSE PRACTITIONER

## 2018-06-12 PROCEDURE — 20610 DRAIN/INJ JOINT/BURSA W/O US: CPT | Performed by: NURSE PRACTITIONER

## 2018-06-12 RX ORDER — METHYLPREDNISOLONE ACETATE 80 MG/ML
80 INJECTION, SUSPENSION INTRA-ARTICULAR; INTRALESIONAL; INTRAMUSCULAR; SOFT TISSUE
Status: COMPLETED | OUTPATIENT
Start: 2018-06-12 | End: 2018-06-12

## 2018-06-12 RX ORDER — BUPIVACAINE HYDROCHLORIDE 5 MG/ML
2 INJECTION, SOLUTION EPIDURAL; INTRACAUDAL
Status: COMPLETED | OUTPATIENT
Start: 2018-06-12 | End: 2018-06-12

## 2018-06-12 RX ADMIN — METHYLPREDNISOLONE ACETATE 80 MG: 80 INJECTION, SUSPENSION INTRA-ARTICULAR; INTRALESIONAL; INTRAMUSCULAR; SOFT TISSUE at 10:15

## 2018-06-12 RX ADMIN — BUPIVACAINE HYDROCHLORIDE 2 ML: 5 INJECTION, SOLUTION EPIDURAL; INTRACAUDAL at 10:15

## 2018-06-12 NOTE — PROGRESS NOTES
Patient: Asa Magaña  YOB: 1940 77 y.o. male  Medical Record Number: [id number]    Chief Complaints:   Chief Complaint   Patient presents with   • Right Knee - Follow-up   • Left Knee - Follow-up       History of Present Illness:Asa Magaña is a 77 y.o. male who presents With complaints of bilateral knee pain.  Patient has been seen previously.  He has known significant osteoarthritis.  It is been over a year since he has had a cortisone injection though that is not his purpose of this visit today.  Patient describes the bilateral knee pain as a severe constant ache worse with any type of movement only slightly better with rest.    Allergies:   Allergies   Allergen Reactions   • Ciprofloxacin Allergic reaction to contrast dye and Palpitations   • Quinolones Allergic reaction to contrast dye and Palpitations       Medications:   Current Outpatient Prescriptions   Medication Sig Dispense Refill   • acetaminophen (TYLENOL) 325 MG tablet Take 2 tablets by mouth Every 4 (Four) Hours As Needed for Mild Pain (1-3).  0   • finasteride (PROSCAR) 5 MG tablet Take 5 mg by mouth Daily.     • hydrocortisone (ANUSOL-HC) 2.5 % rectal cream Insert  into the rectum 2 (Two) Times a Day. 10 g 0   • levothyroxine (SYNTHROID, LEVOTHROID) 75 MCG tablet TK 1 T PO ONCE D  3   • rosuvastatin (CRESTOR) 5 MG tablet TK 1 T PO D  3   • simvastatin (ZOCOR) 20 MG tablet Take 20 mg by mouth Every Night.     • SUMAtriptan (IMITREX) 50 MG tablet Take one tablet at onset of headache. May repeat dose one time in 2 hours if headache not relieved.  0     No current facility-administered medications for this visit.          The following portions of the patient's history were reviewed and updated as appropriate: allergies, current medications, past family history, past medical history, past social history, past surgical history and problem list.    Review of Systems:   A 14 point review of systems was performed. All systems  "negative except pertinent positives/negative listed in HPI above    Physical Exam:   Vitals:    06/12/18 0824   Temp: 97.7 °F (36.5 °C)   Weight: 99.8 kg (220 lb)   Height: 182.9 cm (72\")       General: A and O x 3, ASA, NAD    SCLERA:    Normal    DENTITION:   Normal  Skin clear no unusual lesions noted  Bilateral knees patient has trace amount of effusion noted with 115° flexion neutron extension with a positive Trip negative Lockman calf soft and nontender    Radiology:  Xrays 3views (ap,lateral, sunrise) bilateral knees were ordered and reviewed today secondary to pain and show significant bone-on-bone end-stage osteoarthritis.  Comparative views show definite progression in arthritis     Assessment/Plan:  Osteoarthritis bilateral knees    Patient I discussed treatment options and he would like to proceed with bilateral knee cortisone injections, prior to injections risks were discussed including pain, infection, elevate blood sugar.  Patient verbalized understanding would like to proceed with injections.  In addition patient was for to outpatient physical therapy and we'll follow-up with Dr. Beasley in approximately 2 months.    Large Joint Arthrocentesis  Date/Time: 6/12/2018 10:15 AM  Consent given by: patient  Site marked: site marked  Timeout: Immediately prior to procedure a time out was called to verify the correct patient, procedure, equipment, support staff and site/side marked as required   Supporting Documentation  Indications: pain and joint swelling   Procedure Details  Location: knee - R knee  Preparation: Patient was prepped and draped in the usual sterile fashion  Needle size: 22 G  Approach: anterolateral  Medications administered: 80 mg methylPREDNISolone acetate 80 MG/ML; 2 mL bupivacaine (PF) 0.5 %  Patient tolerance: patient tolerated the procedure well with no immediate complications    Large Joint Arthrocentesis  Date/Time: 6/12/2018 10:15 AM  Consent given by: patient  Site marked: site " marked  Timeout: Immediately prior to procedure a time out was called to verify the correct patient, procedure, equipment, support staff and site/side marked as required   Supporting Documentation  Indications: pain and joint swelling   Procedure Details  Location: knee - L knee  Preparation: Patient was prepped and draped in the usual sterile fashion  Needle size: 22 G  Approach: anterolateral  Medications administered: 2 mL bupivacaine (PF) 0.5 %; 80 mg methylPREDNISolone acetate 80 MG/ML  Patient tolerance: patient tolerated the procedure well with no immediate complications

## 2018-06-14 ENCOUNTER — TELEPHONE (OUTPATIENT)
Dept: ORTHOPEDIC SURGERY | Facility: CLINIC | Age: 78
End: 2018-06-14

## 2018-07-12 ENCOUNTER — OFFICE VISIT (OUTPATIENT)
Dept: ORTHOPEDIC SURGERY | Facility: CLINIC | Age: 78
End: 2018-07-12

## 2018-07-12 VITALS — WEIGHT: 222.1 LBS | BODY MASS INDEX: 30.08 KG/M2 | HEIGHT: 72 IN | TEMPERATURE: 98.1 F

## 2018-07-12 DIAGNOSIS — M17.12 PRIMARY OSTEOARTHRITIS OF LEFT KNEE: Primary | ICD-10-CM

## 2018-07-12 PROCEDURE — 99214 OFFICE O/P EST MOD 30 MIN: CPT | Performed by: ORTHOPAEDIC SURGERY

## 2018-07-12 RX ORDER — MELOXICAM 15 MG/1
15 TABLET ORAL ONCE
Status: CANCELLED | OUTPATIENT
Start: 2018-08-31 | End: 2018-08-31

## 2018-07-12 RX ORDER — CEFAZOLIN SODIUM 2 G/100ML
2 INJECTION, SOLUTION INTRAVENOUS ONCE
Status: CANCELLED | OUTPATIENT
Start: 2018-08-31 | End: 2018-08-31

## 2018-07-12 NOTE — PROGRESS NOTES
Patient: Asa Magaña  YOB: 1940 78 y.o. male  Medical Record Number: [id number]    Chief Complaints:   Chief Complaint   Patient presents with   • Left Knee - Follow-up, Pain       History of Present Illness:Asa Magaña is a 78 y.o. male who presents for follow-up of  Left knee pain which is severe in nature.  He has a stabbing aching pain on the medial side of the knee.  He has undergone injections anti-inflammatories activity modification and physical therapy.  Despite this he has limitation of walking tolerance.  He has trouble performing basic activities of daily living.    Allergies:   Allergies   Allergen Reactions   • Ciprofloxacin Hives and Palpitations   • Quinolones Hives and Palpitations       Medications:   Current Outpatient Prescriptions   Medication Sig Dispense Refill   • acetaminophen (TYLENOL) 325 MG tablet Take 2 tablets by mouth Every 4 (Four) Hours As Needed for Mild Pain (1-3).  0   • finasteride (PROSCAR) 5 MG tablet Take 5 mg by mouth Daily.     • hydrocortisone (ANUSOL-HC) 2.5 % rectal cream Insert  into the rectum 2 (Two) Times a Day. 10 g 0   • levothyroxine (SYNTHROID, LEVOTHROID) 75 MCG tablet TK 1 T PO ONCE D  3   • rosuvastatin (CRESTOR) 5 MG tablet TK 1 T PO D  3   • simvastatin (ZOCOR) 20 MG tablet Take 20 mg by mouth Every Night.     • SUMAtriptan (IMITREX) 50 MG tablet Take one tablet at onset of headache. May repeat dose one time in 2 hours if headache not relieved.  0     No current facility-administered medications for this visit.          The following portions of the patient's history were reviewed and updated as appropriate: allergies, current medications, past family history, past medical history, past social history, past surgical history and problem list.    Review of Systems:   A 14 point review of systems was performed. All systems negative except pertinent positives/negative listed in HPI above    Physical Exam:   Vitals:    07/12/18 0905   Temp:  "98.1 °F (36.7 °C)   Weight: 101 kg (222 lb 1.6 oz)   Height: 182.9 cm (72\")   PainSc:   7       General: A and O x 3, ASA, NAD    SCLERA:    Normal    DENTITION:   Normal  Knee:  left    ALIGNMENT:     Varus  ,   Patella  tracks  midline    GAIT:    Antalgic    SKIN:    No abnormality    RANGE OF MOTION:   0  -  125   DEG    STRENGTH:   4  / 5    LIGAMENTS:    No varus / valgus instability.   Negative  Lachman.    MENISCUS:     Negative   Trip       DISTAL PULSES:    Paplable    DISTAL SENSATION :   Intact    LYMPHATICS:     No   lymphadenopathy    OTHER:          - Positive   effusion      - Crepitance with ROM       Radiology:  Xrays 3views left knee (ap,lateral, sunrise) taken previously demonstratingadvanced varus osteoarthritis with bone on bone articulation, subchondral cysts, and periarticular osteophytes- the wear appears isolated to the medial compartment      Assessment/Plan:  Left knee isolated end-stage medial compartment osteoarthritis.  He has failed the full complement of conservative measures.  Continuation of conservative management vs. UKA vs TKA discussed. After stating understanding of the procedures and associated risks / benefit / alternatives of the various options,  the patient wishes to proceed with unicompartmental knee replacement.  At this point the patient has failed the full gamut of conservative treatment and stating complete understanding of the risks / benefits / anternatives wishes to proceed with surgical treatment.    The patient understands that no guarantees have been made with regard to outcomes of this procedure and that there is a possibility that future surgery is a possibility including conversion to total knee replacement should further disease progression occur in other compartments of the knee.    Risk and benefits of surgery were reviewed.  Including, but not limited to, blood clots or pulmonary embolism, anesthesia risk, infection, fracture, skin/leg numbness, " persistent pain/crepitance/popping/catching, failure of the implant, need for future surgeries, hematoma, possible nerve or blood vessel injury, need for transfusion, and potential risk of stroke,heart attack or death, among others.  The patient understands and wishes to proceed.     It was explained that if tissue has been repaired or reconstructed, there is also an increased chance of failure which may require further management.  Following the completion of the discussion, the patient expressed understanding of this planned course of care, all their questions were answered and consent will be obtained preoperatively.    Operative Plan: left  [facility]/ Tim unicompartmental knee replacement performing the procedure on an outpatient basiswith outpatient rehab

## 2018-08-14 PROBLEM — M17.12 PRIMARY OSTEOARTHRITIS OF LEFT KNEE: Status: ACTIVE | Noted: 2018-08-14

## 2018-08-16 ENCOUNTER — APPOINTMENT (OUTPATIENT)
Dept: PREADMISSION TESTING | Facility: HOSPITAL | Age: 78
End: 2018-08-16

## 2018-08-16 DIAGNOSIS — M17.12 PRIMARY OSTEOARTHRITIS OF LEFT KNEE: ICD-10-CM

## 2018-08-16 LAB
ANION GAP SERPL CALCULATED.3IONS-SCNC: 11 MMOL/L
BILIRUB UR QL STRIP: NEGATIVE
BUN BLD-MCNC: 21 MG/DL (ref 8–23)
BUN/CREAT SERPL: 15.4 (ref 7–25)
CALCIUM SPEC-SCNC: 8.6 MG/DL (ref 8.6–10.5)
CHLORIDE SERPL-SCNC: 106 MMOL/L (ref 98–107)
CLARITY UR: CLEAR
CO2 SERPL-SCNC: 24 MMOL/L (ref 22–29)
COLOR UR: YELLOW
CREAT BLD-MCNC: 1.36 MG/DL (ref 0.76–1.27)
DEPRECATED RDW RBC AUTO: 45 FL (ref 37–54)
ERYTHROCYTE [DISTWIDTH] IN BLOOD BY AUTOMATED COUNT: 13.1 % (ref 11.5–14.5)
GFR SERPL CREATININE-BSD FRML MDRD: 51 ML/MIN/1.73
GLUCOSE BLD-MCNC: 94 MG/DL (ref 65–99)
GLUCOSE UR STRIP-MCNC: NEGATIVE MG/DL
HCT VFR BLD AUTO: 47.2 % (ref 40.4–52.2)
HGB BLD-MCNC: 15.1 G/DL (ref 13.7–17.6)
HGB UR QL STRIP.AUTO: NEGATIVE
KETONES UR QL STRIP: NEGATIVE
LEUKOCYTE ESTERASE UR QL STRIP.AUTO: NEGATIVE
MCH RBC QN AUTO: 30.2 PG (ref 27–32.7)
MCHC RBC AUTO-ENTMCNC: 32 G/DL (ref 32.6–36.4)
MCV RBC AUTO: 94.4 FL (ref 79.8–96.2)
NITRITE UR QL STRIP: NEGATIVE
PH UR STRIP.AUTO: <=5 [PH] (ref 5–8)
PLATELET # BLD AUTO: 209 10*3/MM3 (ref 140–500)
PMV BLD AUTO: 10.8 FL (ref 6–12)
POTASSIUM BLD-SCNC: 4.4 MMOL/L (ref 3.5–5.2)
PROT UR QL STRIP: NEGATIVE
RBC # BLD AUTO: 5 10*6/MM3 (ref 4.6–6)
SODIUM BLD-SCNC: 141 MMOL/L (ref 136–145)
SP GR UR STRIP: 1.02 (ref 1–1.03)
UROBILINOGEN UR QL STRIP: NORMAL
WBC NRBC COR # BLD: 7.51 10*3/MM3 (ref 4.5–10.7)

## 2018-08-16 PROCEDURE — A9270 NON-COVERED ITEM OR SERVICE: HCPCS | Performed by: ORTHOPAEDIC SURGERY

## 2018-08-16 PROCEDURE — 85027 COMPLETE CBC AUTOMATED: CPT | Performed by: ORTHOPAEDIC SURGERY

## 2018-08-16 PROCEDURE — 93005 ELECTROCARDIOGRAM TRACING: CPT

## 2018-08-16 PROCEDURE — 93010 ELECTROCARDIOGRAM REPORT: CPT | Performed by: INTERNAL MEDICINE

## 2018-08-16 PROCEDURE — 36415 COLL VENOUS BLD VENIPUNCTURE: CPT

## 2018-08-16 PROCEDURE — 80048 BASIC METABOLIC PNL TOTAL CA: CPT | Performed by: ORTHOPAEDIC SURGERY

## 2018-08-16 PROCEDURE — 81003 URINALYSIS AUTO W/O SCOPE: CPT | Performed by: ORTHOPAEDIC SURGERY

## 2018-08-16 PROCEDURE — 63710000001 MUPIROCIN 2 % OINTMENT: Performed by: ORTHOPAEDIC SURGERY

## 2018-08-16 RX ORDER — CHLORHEXIDINE GLUCONATE 500 MG/1
CLOTH TOPICAL
COMMUNITY
End: 2018-09-28 | Stop reason: HOSPADM

## 2018-08-16 RX ORDER — IBUPROFEN 200 MG
200 TABLET ORAL EVERY 6 HOURS PRN
COMMUNITY
End: 2018-09-28 | Stop reason: HOSPADM

## 2018-08-16 RX ORDER — LEVOTHYROXINE SODIUM 0.07 MG/1
75 TABLET ORAL EVERY MORNING
COMMUNITY

## 2018-08-16 RX ORDER — ROSUVASTATIN CALCIUM 5 MG/1
5 TABLET, COATED ORAL NIGHTLY
COMMUNITY

## 2018-08-16 NOTE — DISCHARGE INSTRUCTIONS
Take the following medications the morning of surgery with a small sip of water:    LEVOTHYROXINE    ARRIVE AT 5:30    General Instructions:  • Do not eat solid food after midnight the night before surgery.  • You may drink clear liquids day of surgery but must stop at least one hour before your hospital arrival time.  • It is beneficial for you to have a clear drink that contains carbohydrates the day of surgery.  We suggest a 12 to 20 ounce bottle of Gatorade or Powerade for non-diabetic patients or a 12 to 20 ounce bottle of G2 or Powerade Zero for diabetic patients. (Pediatric patients, are not advised to drink a 12 to 20 ounce carbohydrate drink)    Clear liquids are liquids you can see through.  Nothing red in color.     Plain water                               Sports drinks  Sodas                                   Gelatin (Jell-O)  Fruit juices without pulp such as white grape juice and apple juice  Popsicles that contain no fruit or yogurt  Tea or coffee (no cream or milk added)  Gatorade / Powerade  G2 / Powerade Zero    • Infants may have breast milk up to four hours before surgery.  • Infants drinking formula may drink formula up to six hours before surgery.   • Patients who avoid smoking, chewing tobacco and alcohol for 4 weeks prior to surgery have a reduced risk of post-operative complications.  Quit smoking as many days before surgery as you can.  • Do not smoke, use chewing tobacco or drink alcohol the day of surgery.   • If applicable bring your C-PAP/ BI-PAP machine.  • Bring any papers given to you in the doctor’s office.  • Wear clean comfortable clothes and socks.  • Do not wear contact lenses or make-up.  Bring a case for your glasses.   • Bring crutches or walker if applicable.  • Remove all piercings.  Leave jewelry and any other valuables at home.  • Hair extensions with metal clips must be removed prior to surgery.  • The Pre-Admission Testing nurse will instruct you to bring medications  if unable to obtain an accurate list in Pre-Admission Testing.        If you were given a blood bank ID arm band remember to bring it with you the day of surgery.    Preventing a Surgical Site Infection:  • For 2 to 3 days before surgery, avoid shaving with a razor because the razor can irritate skin and make it easier to develop an infection.    • Any areas of open skin can increase the risk of a post-operative wound infection by allowing bacteria to enter and travel throughout the body.  Notify your surgeon if you have any skin wounds / rashes even if it is not near the expected surgical site.  The area will need assessed to determine if surgery should be delayed until it is healed.  • The night prior to surgery sleep in a clean bed with clean clothing.  Do not allow pets to sleep with you.  • Shower on the morning of surgery using a fresh bar of anti-bacterial soap (such as Dial) and clean washcloth.  Dry with a clean towel and dress in clean clothing.  • Ask your surgeon if you will be receiving antibiotics prior to surgery.  • Make sure you, your family, and all healthcare providers clean their hands with soap and water or an alcohol based hand  before caring for you or your wound.    Day of surgery:  Upon arrival, a Pre-op nurse and Anesthesiologist will review your health history, obtain vital signs, and answer questions you may have.  The only belongings needed at this time will be your home medications and if applicable your C-PAP/BI-PAP machine.  If you are staying overnight your family can leave the rest of your belongings in the car and bring them to your room later.  A Pre-op nurse will start an IV and you may receive medication in preparation for surgery, including something to help you relax.  Your family will be able to see you in the Pre-op area.  While you are in surgery your family should notify the waiting room  if they leave the waiting room area and provide a contact phone  number.    Please be aware that surgery does come with discomfort.  We want to make every effort to control your discomfort so please discuss any uncontrolled symptoms with your nurse.   Your doctor will most likely have prescribed pain medications.      If you are going home after surgery you will receive individualized written care instructions before being discharged.  A responsible adult must drive you to and from the hospital on the day of your surgery and stay with you for 24 hours.    If you are staying overnight following surgery, you will be transported to your hospital room following the recovery period.  [facility] has all private rooms.    You have received a list of surgical assistants for your reference.  If you have any questions please call Pre-Admission Testing at [phone].  Deductibles and co-payments are collected on the day of service. Please be prepared to pay the required co-pay, deductible or deposit on the day of service as defined by your plan.    2% CHLORAHEXIDINE GLUCONATE* CLOTH  Preparing or “prepping” skin before surgery can reduce the risk of infection at the surgical site. To make the process easier, [facility] has chosen disposable cloths moistened with a rinse-free, 2% Chlorhexidine Gluconate (CHG) antiseptic solution. The steps below outline the prepping process and should be carefully followed.        Use the prep cloth on the area that is circled in the diagram             Directions Night before Surgery  1) Shower using a fresh bar of anti-bacterial soap (such as Dial) and clean washcloth.  Use a clean towel to completely dry your skin.  2) Do not use any lotions, oils or creams on your skin.  3) Open the package and remove 1 cloth, wipe your skin for 30 seconds in a circular motion.  Allow to dry for 3 minutes.  4) Repeat #3 with second cloth.  5) Do not touch your eyes, ears, or mouth with the prep cloth.  6) Allow the wet prep solution to air  dry.  7) Discard the prep cloth and wash your hands with soap and water.   8) Dress in clean bed clothes and sleep on fresh clean bed sheets.   9) You may experience some temporary itching after the prep.    Directions Day of Surgery  1) Repeat steps 1,2,3,4,5,6,7, and 9.   2) Dress in clean clothes before coming to the hospital.    BACTROBAN NASAL OINTMENT  There are many germs normally in your nose. Bactroban is an ointment that will help reduce these germs. Please follow these instructions for Bactroban use:      ____The day before surgery in the morning  Date________    ____The day before surgery in the evening              Date________    ____The day of surgery in the morning    Date________    **Squirt ½ package of Bactroban Ointment onto a cotton applicator and apply to inside of 1st nostril.  Squirt the remaining Bactroban and apply to the inside of the other nostril.    PERIDEX- ORAL:  Use only if your surgeon has ordered  Use the night before and morning of surgery - Swish, gargle, and spit - do not swallow.

## 2018-08-23 ENCOUNTER — OFFICE VISIT (OUTPATIENT)
Dept: ORTHOPEDIC SURGERY | Facility: CLINIC | Age: 78
End: 2018-08-23

## 2018-08-23 VITALS — WEIGHT: 217 LBS | HEIGHT: 72 IN | BODY MASS INDEX: 29.39 KG/M2

## 2018-08-23 DIAGNOSIS — M17.12 PRIMARY OSTEOARTHRITIS OF LEFT KNEE: Primary | ICD-10-CM

## 2018-08-23 PROCEDURE — S0260 H&P FOR SURGERY: HCPCS | Performed by: NURSE PRACTITIONER

## 2018-08-23 PROCEDURE — 77077 JOINT SURVEY SINGLE VIEW: CPT | Performed by: ORTHOPAEDIC SURGERY

## 2018-08-23 NOTE — H&P
Patient: Asa Magaña    Date of Admission: 8/31/18    YOB: 1940    Medical Record Number: [id number]    Admitting Physician: Dr. Davin Beasley    Reason for Admission: End Stage Left Medial Knee OA    History of Present Illness: 78 y.o. male presents with severe end stage medial compartment knee osteoarthritis which has not been responsive to the full compliment of conservative measures. Despite conservative attempts, there is still severe, constant activity limiting pain. Given the severity of the pain, the patient has elected to proceed with knee replacement.    Allergies:   Allergies   Allergen Reactions   • Ciprofloxacin Hives and Palpitations   • Quinolones Hives and Palpitations         Current Medications:  Scheduled Meds:  PRN Meds:.    PMH:  Past Medical History:   Diagnosis Date   • Asthma    • BPH (benign prostatic hyperplasia)    • History of kidney stones    • Hx of migraine headaches    • Hyperlipidemia    • Hypothyroidism    • Knee pain    • Sepsis (CMS/HCC)     bladder infection and pneumonia  x 2, FEB 2017   • Skin abrasion     LEFT LEG.  INSTRUCTED PT TO NOTIFY DR CLARKE .  [facility] HAS APPT NEXT WEEK.         PSurg/Soc/Fam Hx:     Past Surgical History:   Procedure Laterality Date   • COLONOSCOPY  2016    benign polyp-repeat 5 years- Filemon Beasley M.D.   • CYSTOSCOPY TRANSURETHRAL RESECTION OF PROSTATE     • CYSTOSCOPY W/ URETERAL STENT PLACEMENT Right 1/20/2017    Procedure: CYSTOSCOPY, ureteroscopy,  URETERAL STENT INSERTION, RT;  Surgeon: Shashi Arciniega MD;  Location: [facility];  Service:    • HERNIA REPAIR     • KNEE SURGERY      KNEE ARTHROSCOPY X3   • THYROIDECTOMY          Social History     Occupational History   • Not on file.     Social History Main Topics   • Smoking status: Never Smoker   • Smokeless tobacco: Never Used   • Alcohol use Yes      Comment: seldom   • Drug use: No   • Sexual activity: Defer      Social History     Social History Narrative   • No  "narrative on file        Family History   Problem Relation Age of Onset   • Diabetes Mother    • Heart disease Mother    • Diabetes Father    • Heart disease Father    • Malig Hyperthermia Neg Hx          Review of Systems:   A 14 point review of systems was performed, pertinent positives discussed above, all other systems are negative    Physical Exam: 78 y.o. male  Vital Signs :   Vitals:    08/23/18 1503   Weight: 98.4 kg (217 lb)   Height: 182.9 cm (72\")     General: Alert and Oriented x 3, No acute distress.  Psych: mood and affect appropriate; recent and remote memory intact  Eyes: conjunctiva clear; pupils equally round and reactive, sclera nonicteric  CV: no peripheral edema  Resp: normal respiratory effort  Skin: no rashes or wounds; normal turgor  Musculosketetal: pain localized to the medial aspect of the knee. Neutral alignment in stance. No patellofemoral crepitance or pain with patellofemoral grind. Negative Lachman  Vascular: palpable distal pulses    Xrays:  -3 views (AP, lateral, and sunrise) were reviewed demonstrating end-stage isolated medial compartment OA with medial bone on bone articulation. The lateral and patellofemoral compartments are well preserved.  -A full length AP xray was ordered today for purposes of operative alignment demonstrating end stage medial compartment arthritic findings. There are no previous full length films for review    Assessment:  End-stage Left knee medial compartment osteoarthritis. Conservative measures have failed.      Plan:  The plan is to proceed with Left Unicompartmental Knee Replacement possible Total Knee Replacement. The patient voiced understanding of the risks, benefits, and alternative forms of treatment that were discussed with Dr Beasley at the time of scheduling.  Patient is planning on going home same day with outpatient physical therapy    Fe Coker, APRN  8/23/2018  "

## 2018-09-09 NOTE — PROGRESS NOTES
LPC INPATIENT PROGRESS NOTE         [facility] INTENSIVE CARE    3/9/2017      PATIENT IDENTIFICATION:   Name:  Asa Magaña      MRN:  [id number]     76 y.o.  male             LOS 1    Reason for visit: f/u sepsis with shock, PNA and COPD        SUBJECTIVE:    Transferred to ICU for low bp. Not requiring pressor.  Lactic trending down.  D/w pt and family.    Objective   OBJECTIVE:    Vital Sign Min/Max for last 24 hours  Temp  Min: 98.3 °F (36.8 °C)  Max: 101.8 °F (38.8 °C)   BP  Min: 85/54  Max: 119/59   Pulse  Min: 94  Max: 110   Resp  Min: 18  Max: 20   SpO2  Min: 90 %  Max: 96 %   Flow (L/min)  Min: 2  Max: 6   Weight  Min: 204 lb 11.2 oz (92.9 kg)  Max: 204 lb 11.2 oz (92.9 kg)                         Body mass index is 27.76 kg/(m^2).    Intake/Output Summary (Last 24 hours) at 03/09/17 0809  Last data filed at 03/09/17 0600   Gross per 24 hour   Intake   5109 ml   Output    650 ml   Net   4459 ml         Exam:  GEN:  No distress, appears stated age  EYES:   PERRL, anicteric sclera  ENT:    External ears/nose normal, OP clear  NECK:  No adenopathy, midline trachea  LUNGS: Normal chest on inspection, palpation and scattered course with wheeze on auscultation  CV:  Normal S1S2, without murmur  ABD:  Non tender, non distended, no hepatosplenomegaly, +BS  EXT:  No edema, cyanosis or clubbing    Scheduled meds:    atorvastatin 10 mg Oral Daily   ceftriaxone 2 g Intravenous Q24H   And      azithromycin 500 mg Intravenous Q24H   fluticasone 1 spray Each Nare BID   heparin (porcine) 5,000 Units Subcutaneous Q12H   levothyroxine 175 mcg Oral Q AM   pantoprazole 40 mg Oral Q AM     IV meds:                        sodium chloride 175 mL/hr Last Rate: 175 mL/hr (03/09/17 0516)     Data Review:    Results from last 7 days  Lab Units 03/09/17  0605 03/08/17  0852   SODIUM mmol/L 139 139   POTASSIUM mmol/L 4.0 3.8   CHLORIDE mmol/L 106 101   TOTAL CO2 mmol/L 21.3* 24.2   BUN mg/dL 25* 26*   CREATININE    Envenenamiento Infantil:No Tóxico [Childhood Poisoning: Non-Toxic]  Sanches evaluado a ballesteros hijo por posible envenenamiento. Aparentemente, no ha tenido efecto tóxico (toxic effect). Es muy poco probable que vayan a aparecer nuevos síntomas. Nura medida preventiva, observe si, judson las próximas 24 horas, se presentan síntomas nuevos. El síntoma exacto dependerá del tipo de producto que haya tragado.  Cuidados En [place]:  · Si le dieron CARBÓN LÍQUIDO (LIQUID CHARCOAL) para neutralizar el efecto de la sustancia que tragó, es posible que tenga náuseas (nausea) y, posiblemente, vómito (vomit) judson las próximas horas. También hará que la materia fecal sea de color negruzco judson los próximos 1 ó 2 días. Junto con el carbón (charcoal) se suele angel un laxante (laxative) para acelerar la eliminación de toxinas del tracto intestinal (los intestinos). Eso le causará diarrea (diarrhea) judson las próximas 24 horas. Si no le dieron ningún laxante, es posible que tenga tendencia al estreñimiento (constipation). De ser así, pregúntele a ballesteros médico cuál es la mejor manera de tratar flower malestar.  · Aproveche esta visita nura recordatorio para evaluar ballesteros hogar a fin de abdulkadir si hay sustancias venenosas al alcance de los niños. Tenga el número telefónico del Poison Control Center (Centro de Información Toxicológica) en algún lugar donde le resulte fácil encontrarlo.  Programe ashley VISITA DE CONTROL con ballesteros médico si no desaparecen todos los síntomas dentro de las próximas 24 horas.  Busque Prontamente Atención Médica  si algo de lo siguiente ocurre:  · Cambios en el comportamiento habitual: entusiasmo o somnolencia inusuales.  · Respiración rápida (desde el nacimiento hasta las 6 semanas: más de 60 respiraciones por minuto. De 6 semanas a 2 años: más de 45 respiraciones por minuto. De 3 a 6 años: más de 35 respiraciones por minuto. De 7 a 10 años: más de 30 respiraciones por minuto. Mayores de 10 años: más de 25 respiraciones por  mg/dL 1.29* 1.67*   GLUCOSE mg/dL 112* 103*   CALCIUM mg/dL 7.1* 8.9         Estimated Creatinine Clearance: 55.1 mL/min (by C-G formula based on Cr of 1.29).    Results from last 7 days  Lab Units 03/09/17  0605 03/08/17  0852   WBC 10*3/mm3 16.36* 22.23*   HEMOGLOBIN g/dL 11.3* 14.5   PLATELETS 10*3/mm3 139* 170           Results from last 7 days  Lab Units 03/09/17  0605 03/08/17  0852   ALT (SGPT) U/L 12 16   AST (SGOT) U/L 17 20       Results from last 7 days  Lab Units 03/08/17  1553   PH, ARTERIAL pH units 7.408   PO2 ART mm Hg 79.1*   PCO2, ARTERIAL mm Hg 33.9*   HCO3 ART mmol/L 21.4*       Lactate 2.3          Assessment   ASSESSMENT:   Sepsis with shock/hypotension  Right upper pneumonia  COPD Exacerbation  Diarrhea  Acute hypoxemic respiratory failure secondary to pneumonia  Lactic acidosis  Acute kidney injury      PLAN:  Can move back out of ICU.  Continue Abx.    Continue fluid resuscitation.  Continue nebs, steroids and oxygen as needs.    Adam Winston MD  Pulmonary and Critical Care Medicine  [facility]  3/9/2017    8:09 AM      minuto).  · Respiración lenta (menos de 10 veces por minuto).  · Tos frecuente o dificultades para respirar.  · Diarrea o vómito persistente.  · Mareo o debilidad.  · Ochoa en el vómito o en la materia fecal (de color negruzco o rojizo).  · Temblores o convulsiones (seizure).  · Dolor en el abdomen.  · Fiebre de 100.4°F (38°C) tomada oralmente o de 101.4°F (38.5°C) tomada rectalmente, o más cortney, o según las indicaciones de ballesteros proveedor de atención médica.  © 9398-7606 [employer]. [address] [place] [address], [place] [address]. Todos los derechos reservados. Esta información no pretende sustituir la atención médica profesional. Sólo ballesteros médico puede diagnosticar y tratar un problema de demetrius.

## 2018-09-21 ENCOUNTER — APPOINTMENT (OUTPATIENT)
Dept: PREADMISSION TESTING | Facility: HOSPITAL | Age: 78
End: 2018-09-21

## 2018-09-21 VITALS
HEIGHT: 72 IN | DIASTOLIC BLOOD PRESSURE: 80 MMHG | RESPIRATION RATE: 18 BRPM | BODY MASS INDEX: 29.26 KG/M2 | OXYGEN SATURATION: 99 % | TEMPERATURE: 97.5 F | WEIGHT: 216.06 LBS | SYSTOLIC BLOOD PRESSURE: 131 MMHG | HEART RATE: 57 BPM

## 2018-09-21 LAB
ANION GAP SERPL CALCULATED.3IONS-SCNC: 9.2 MMOL/L
BILIRUB UR QL STRIP: NEGATIVE
BUN BLD-MCNC: 21 MG/DL (ref 8–23)
BUN/CREAT SERPL: 16.3 (ref 7–25)
CALCIUM SPEC-SCNC: 8.6 MG/DL (ref 8.6–10.5)
CHLORIDE SERPL-SCNC: 100 MMOL/L (ref 98–107)
CLARITY UR: CLEAR
CO2 SERPL-SCNC: 26.8 MMOL/L (ref 22–29)
COLOR UR: YELLOW
CREAT BLD-MCNC: 1.29 MG/DL (ref 0.76–1.27)
DEPRECATED RDW RBC AUTO: 44.4 FL (ref 37–54)
ERYTHROCYTE [DISTWIDTH] IN BLOOD BY AUTOMATED COUNT: 13 % (ref 11.5–14.5)
GFR SERPL CREATININE-BSD FRML MDRD: 54 ML/MIN/1.73
GLUCOSE BLD-MCNC: 89 MG/DL (ref 65–99)
GLUCOSE UR STRIP-MCNC: NEGATIVE MG/DL
HCT VFR BLD AUTO: 45.8 % (ref 40.4–52.2)
HGB BLD-MCNC: 15.2 G/DL (ref 13.7–17.6)
HGB UR QL STRIP.AUTO: NEGATIVE
KETONES UR QL STRIP: NEGATIVE
LEUKOCYTE ESTERASE UR QL STRIP.AUTO: NEGATIVE
MCH RBC QN AUTO: 31 PG (ref 27–32.7)
MCHC RBC AUTO-ENTMCNC: 33.2 G/DL (ref 32.6–36.4)
MCV RBC AUTO: 93.3 FL (ref 79.8–96.2)
NITRITE UR QL STRIP: NEGATIVE
PH UR STRIP.AUTO: 5.5 [PH] (ref 5–8)
PLATELET # BLD AUTO: 202 10*3/MM3 (ref 140–500)
PMV BLD AUTO: 10.5 FL (ref 6–12)
POTASSIUM BLD-SCNC: 4.4 MMOL/L (ref 3.5–5.2)
PROT UR QL STRIP: NEGATIVE
RBC # BLD AUTO: 4.91 10*6/MM3 (ref 4.6–6)
SODIUM BLD-SCNC: 136 MMOL/L (ref 136–145)
SP GR UR STRIP: 1.02 (ref 1–1.03)
UROBILINOGEN UR QL STRIP: NORMAL
WBC NRBC COR # BLD: 7.91 10*3/MM3 (ref 4.5–10.7)

## 2018-09-21 PROCEDURE — 81003 URINALYSIS AUTO W/O SCOPE: CPT | Performed by: ORTHOPAEDIC SURGERY

## 2018-09-21 PROCEDURE — 80048 BASIC METABOLIC PNL TOTAL CA: CPT | Performed by: ORTHOPAEDIC SURGERY

## 2018-09-21 PROCEDURE — 36415 COLL VENOUS BLD VENIPUNCTURE: CPT

## 2018-09-21 PROCEDURE — 85027 COMPLETE CBC AUTOMATED: CPT | Performed by: ORTHOPAEDIC SURGERY

## 2018-09-21 ASSESSMENT — KOOS JR
KOOS JR SCORE: 13
KOOS JR SCORE: 54.84

## 2018-09-21 NOTE — DISCHARGE INSTRUCTIONS
ARRIVE DAY OF SURGERY AT 5:30AM TO MAIN SURGERY          Take the following medications the morning of surgery with a small sip of water:    NONE    General Instructions:  • Do not eat solid food after midnight the night before surgery.  • You may drink clear liquids day of surgery but must stop at least one hour before your hospital arrival time.  • It is beneficial for you to have a clear drink that contains carbohydrates the day of surgery.  We suggest a 12 to 20 ounce bottle of Gatorade or Powerade for non-diabetic patients or a 12 to 20 ounce bottle of G2 or Powerade Zero for diabetic patients. (Pediatric patients, are not advised to drink a 12 to 20 ounce carbohydrate drink)    Clear liquids are liquids you can see through.  Nothing red in color.     Plain water                               Sports drinks  Sodas                                   Gelatin (Jell-O)  Fruit juices without pulp such as white grape juice and apple juice  Popsicles that contain no fruit or yogurt  Tea or coffee (no cream or milk added)  Gatorade / Powerade  G2 / Powerade Zero    • Infants may have breast milk up to four hours before surgery.  • Infants drinking formula may drink formula up to six hours before surgery.   • Patients who avoid smoking, chewing tobacco and alcohol for 4 weeks prior to surgery have a reduced risk of post-operative complications.  Quit smoking as many days before surgery as you can.  • Do not smoke, use chewing tobacco or drink alcohol the day of surgery.   • If applicable bring your C-PAP/ BI-PAP machine.  • Bring any papers given to you in the doctor’s office.  • Wear clean comfortable clothes and socks.  • Do not wear contact lenses or make-up.  Bring a case for your glasses.   • Bring crutches or walker if applicable.  • Remove all piercings.  Leave jewelry and any other valuables at home.  • Hair extensions with metal clips must be removed prior to surgery.  • The Pre-Admission Testing nurse will  instruct you to bring medications if unable to obtain an accurate list in Pre-Admission Testing.            Preventing a Surgical Site Infection:  • For 2 to 3 days before surgery, avoid shaving with a razor because the razor can irritate skin and make it easier to develop an infection.    • Any areas of open skin can increase the risk of a post-operative wound infection by allowing bacteria to enter and travel throughout the body.  Notify your surgeon if you have any skin wounds / rashes even if it is not near the expected surgical site.  The area will need assessed to determine if surgery should be delayed until it is healed.  • The night prior to surgery sleep in a clean bed with clean clothing.  Do not allow pets to sleep with you.  • Shower on the morning of surgery using a fresh bar of anti-bacterial soap (such as Dial) and clean washcloth.  Dry with a clean towel and dress in clean clothing.  • Ask your surgeon if you will be receiving antibiotics prior to surgery.  • Make sure you, your family, and all healthcare providers clean their hands with soap and water or an alcohol based hand  before caring for you or your wound.    Day of surgery:  Upon arrival, a Pre-op nurse and Anesthesiologist will review your health history, obtain vital signs, and answer questions you may have.  The only belongings needed at this time will be your home medications and if applicable your C-PAP/BI-PAP machine.  If you are staying overnight your family can leave the rest of your belongings in the car and bring them to your room later.  A Pre-op nurse will start an IV and you may receive medication in preparation for surgery, including something to help you relax.  Your family will be able to see you in the Pre-op area.  While you are in surgery your family should notify the waiting room  if they leave the waiting room area and provide a contact phone number.    Please be aware that surgery does come with  discomfort.  We want to make every effort to control your discomfort so please discuss any uncontrolled symptoms with your nurse.   Your doctor will most likely have prescribed pain medications.      If you are going home after surgery you will receive individualized written care instructions before being discharged.  A responsible adult must drive you to and from the hospital on the day of your surgery and stay with you for 24 hours.    If you are staying overnight following surgery, you will be transported to your hospital room following the recovery period.  [facility] has all private rooms.    You have received a list of surgical assistants for your reference.  If you have any questions please call Pre-Admission Testing at [phone].  Deductibles and co-payments are collected on the day of service. Please be prepared to pay the required co-pay, deductible or deposit on the day of service as defined by your plan.    2% CHLORAHEXIDINE GLUCONATE* CLOTH  Preparing or “prepping” skin before surgery can reduce the risk of infection at the surgical site. To make the process easier, [facility] has chosen disposable cloths moistened with a rinse-free, 2% Chlorhexidine Gluconate (CHG) antiseptic solution. The steps below outline the prepping process and should be carefully followed.        Use the prep cloth on the area that is circled in the diagram             Directions Night before Surgery  1) Shower using a fresh bar of anti-bacterial soap (such as Dial) and clean washcloth.  Use a clean towel to completely dry your skin.  2) Do not use any lotions, oils or creams on your skin.  3) Open the package and remove 1 cloth, wipe your skin for 30 seconds in a circular motion.  Allow to dry for 3 minutes.  4) Repeat #3 with second cloth.  5) Do not touch your eyes, ears, or mouth with the prep cloth.  6) Allow the wet prep solution to air dry.  7) Discard the prep cloth and wash your hands with  soap and water.   8) Dress in clean bed clothes and sleep on fresh clean bed sheets.   9) You may experience some temporary itching after the prep.    Directions Day of Surgery  1) Repeat steps 1,2,3,4,5,6,7, and 9.   2) Dress in clean clothes before coming to the hospital.    BACTROBAN NASAL OINTMENT  There are many germs normally in your nose. Bactroban is an ointment that will help reduce these germs. Please follow these instructions for Bactroban use:      ____The day before surgery in the morning  Date________    ____The day before surgery in the evening              Date________    ____The day of surgery in the morning    Date________    **Squirt ½ package of Bactroban Ointment onto a cotton applicator and apply to inside of 1st nostril.  Squirt the remaining Bactroban and apply to the inside of the other nostril.

## 2018-09-27 ENCOUNTER — OFFICE VISIT (OUTPATIENT)
Dept: ORTHOPEDIC SURGERY | Facility: CLINIC | Age: 78
End: 2018-09-27

## 2018-09-27 VITALS
DIASTOLIC BLOOD PRESSURE: 70 MMHG | HEIGHT: 72 IN | WEIGHT: 216 LBS | SYSTOLIC BLOOD PRESSURE: 120 MMHG | BODY MASS INDEX: 29.26 KG/M2 | TEMPERATURE: 98.8 F

## 2018-09-27 DIAGNOSIS — M17.12 PRIMARY OSTEOARTHRITIS OF LEFT KNEE: Primary | ICD-10-CM

## 2018-09-27 PROCEDURE — S0260 H&P FOR SURGERY: HCPCS | Performed by: NURSE PRACTITIONER

## 2018-09-28 ENCOUNTER — HOSPITAL ENCOUNTER (OUTPATIENT)
Facility: HOSPITAL | Age: 78
Discharge: HOME HEALTH | End: 2018-09-28
Attending: ORTHOPAEDIC SURGERY | Admitting: ORTHOPAEDIC SURGERY

## 2018-09-28 ENCOUNTER — ANESTHESIA EVENT (OUTPATIENT)
Dept: PERIOP | Facility: HOSPITAL | Age: 78
End: 2018-09-28

## 2018-09-28 ENCOUNTER — ANESTHESIA (OUTPATIENT)
Dept: PERIOP | Facility: HOSPITAL | Age: 78
End: 2018-09-28

## 2018-09-28 ENCOUNTER — APPOINTMENT (OUTPATIENT)
Dept: GENERAL RADIOLOGY | Facility: HOSPITAL | Age: 78
End: 2018-09-28
Attending: ORTHOPAEDIC SURGERY

## 2018-09-28 VITALS
OXYGEN SATURATION: 95 % | RESPIRATION RATE: 16 BRPM | HEART RATE: 78 BPM | TEMPERATURE: 97.3 F | DIASTOLIC BLOOD PRESSURE: 80 MMHG | HEIGHT: 72 IN | SYSTOLIC BLOOD PRESSURE: 137 MMHG | BODY MASS INDEX: 29.26 KG/M2 | WEIGHT: 216 LBS

## 2018-09-28 DIAGNOSIS — R26.2 DIFFICULTY WALKING: Primary | ICD-10-CM

## 2018-09-28 DIAGNOSIS — M17.12 PRIMARY OSTEOARTHRITIS OF LEFT KNEE: ICD-10-CM

## 2018-09-28 PROCEDURE — 63710000001 MELOXICAM 15 MG TABLET: Performed by: ORTHOPAEDIC SURGERY

## 2018-09-28 PROCEDURE — 97161 PT EVAL LOW COMPLEX 20 MIN: CPT

## 2018-09-28 PROCEDURE — G8978 MOBILITY CURRENT STATUS: HCPCS

## 2018-09-28 PROCEDURE — 25010000003 CEFAZOLIN IN DEXTROSE 2-4 GM/100ML-% SOLUTION: Performed by: ORTHOPAEDIC SURGERY

## 2018-09-28 PROCEDURE — 25010000002 ONDANSETRON PER 1 MG: Performed by: NURSE ANESTHETIST, CERTIFIED REGISTERED

## 2018-09-28 PROCEDURE — 97110 THERAPEUTIC EXERCISES: CPT

## 2018-09-28 PROCEDURE — 25010000002 PROMETHAZINE PER 50 MG: Performed by: ORTHOPAEDIC SURGERY

## 2018-09-28 PROCEDURE — A9270 NON-COVERED ITEM OR SERVICE: HCPCS | Performed by: ORTHOPAEDIC SURGERY

## 2018-09-28 PROCEDURE — 25010000002 VANCOMYCIN 10 G RECONSTITUTED SOLUTION: Performed by: ORTHOPAEDIC SURGERY

## 2018-09-28 PROCEDURE — 73560 X-RAY EXAM OF KNEE 1 OR 2: CPT

## 2018-09-28 PROCEDURE — G8980 MOBILITY D/C STATUS: HCPCS

## 2018-09-28 PROCEDURE — 27446 REVISION OF KNEE JOINT: CPT | Performed by: NURSE PRACTITIONER

## 2018-09-28 PROCEDURE — 63710000001 MASTISOL LIQUID: Performed by: ORTHOPAEDIC SURGERY

## 2018-09-28 PROCEDURE — 25010000002 HYDROMORPHONE PER 4 MG: Performed by: NURSE ANESTHETIST, CERTIFIED REGISTERED

## 2018-09-28 PROCEDURE — 25010000002 PROPOFOL 10 MG/ML EMULSION: Performed by: NURSE ANESTHETIST, CERTIFIED REGISTERED

## 2018-09-28 PROCEDURE — 25010000002 FENTANYL CITRATE (PF) 100 MCG/2ML SOLUTION: Performed by: NURSE ANESTHETIST, CERTIFIED REGISTERED

## 2018-09-28 PROCEDURE — 25010000002 FENTANYL CITRATE (PF) 100 MCG/2ML SOLUTION: Performed by: ANESTHESIOLOGY

## 2018-09-28 PROCEDURE — 27446 REVISION OF KNEE JOINT: CPT | Performed by: ORTHOPAEDIC SURGERY

## 2018-09-28 PROCEDURE — 25010000002 MIDAZOLAM PER 1 MG: Performed by: ANESTHESIOLOGY

## 2018-09-28 PROCEDURE — 63710000001 HYDROCODONE-ACETAMINOPHEN 7.5-325 MG TABLET: Performed by: ORTHOPAEDIC SURGERY

## 2018-09-28 PROCEDURE — C1713 ANCHOR/SCREW BN/BN,TIS/BN: HCPCS | Performed by: ORTHOPAEDIC SURGERY

## 2018-09-28 PROCEDURE — G8979 MOBILITY GOAL STATUS: HCPCS

## 2018-09-28 PROCEDURE — 63710000001 PREGABALIN 75 MG CAPSULE: Performed by: ORTHOPAEDIC SURGERY

## 2018-09-28 PROCEDURE — C1776 JOINT DEVICE (IMPLANTABLE): HCPCS | Performed by: ORTHOPAEDIC SURGERY

## 2018-09-28 PROCEDURE — 25010000002 DEXAMETHASONE PER 1 MG: Performed by: NURSE ANESTHETIST, CERTIFIED REGISTERED

## 2018-09-28 PROCEDURE — 25010000002 KETOROLAC TROMETHAMINE PER 15 MG: Performed by: ORTHOPAEDIC SURGERY

## 2018-09-28 PROCEDURE — 25010000002 ONDANSETRON PER 1 MG: Performed by: ORTHOPAEDIC SURGERY

## 2018-09-28 DEVICE — CMT BONE PALACOS R HI/VISC 1X40: Type: IMPLANTABLE DEVICE | Site: KNEE | Status: FUNCTIONAL

## 2018-09-28 DEVICE — IMPLANTABLE DEVICE: Type: IMPLANTABLE DEVICE | Site: KNEE | Status: FUNCTIONAL

## 2018-09-28 RX ORDER — HYDROCODONE BITARTRATE AND ACETAMINOPHEN 7.5; 325 MG/1; MG/1
TABLET ORAL
Qty: 60 TABLET | Refills: 0 | Status: SHIPPED | OUTPATIENT
Start: 2018-09-28 | End: 2018-10-12 | Stop reason: SDUPTHER

## 2018-09-28 RX ORDER — SUMATRIPTAN 100 MG/1
50 TABLET, FILM COATED ORAL ONCE AS NEEDED
Status: DISCONTINUED | OUTPATIENT
Start: 2018-09-28 | End: 2018-09-28 | Stop reason: HOSPADM

## 2018-09-28 RX ORDER — PROMETHAZINE HYDROCHLORIDE 25 MG/1
12.5 TABLET ORAL ONCE AS NEEDED
Status: DISCONTINUED | OUTPATIENT
Start: 2018-09-28 | End: 2018-09-28 | Stop reason: HOSPADM

## 2018-09-28 RX ORDER — PANTOPRAZOLE SODIUM 40 MG/1
40 TABLET, DELAYED RELEASE ORAL
Status: DISCONTINUED | OUTPATIENT
Start: 2018-09-28 | End: 2018-09-28 | Stop reason: HOSPADM

## 2018-09-28 RX ORDER — CEFAZOLIN SODIUM 2 G/100ML
2 INJECTION, SOLUTION INTRAVENOUS ONCE
Status: COMPLETED | OUTPATIENT
Start: 2018-09-28 | End: 2018-09-28

## 2018-09-28 RX ORDER — TRANEXAMIC ACID 100 MG/ML
INJECTION, SOLUTION INTRAVENOUS AS NEEDED
Status: DISCONTINUED | OUTPATIENT
Start: 2018-09-28 | End: 2018-09-28 | Stop reason: SURG

## 2018-09-28 RX ORDER — DEXAMETHASONE SODIUM PHOSPHATE 10 MG/ML
INJECTION INTRAMUSCULAR; INTRAVENOUS AS NEEDED
Status: DISCONTINUED | OUTPATIENT
Start: 2018-09-28 | End: 2018-09-28 | Stop reason: SURG

## 2018-09-28 RX ORDER — FENTANYL CITRATE 50 UG/ML
50 INJECTION, SOLUTION INTRAMUSCULAR; INTRAVENOUS
Status: DISCONTINUED | OUTPATIENT
Start: 2018-09-28 | End: 2018-09-28 | Stop reason: HOSPADM

## 2018-09-28 RX ORDER — WOUND DRESSING ADHESIVE - LIQUID
LIQUID MISCELLANEOUS AS NEEDED
Status: DISCONTINUED | OUTPATIENT
Start: 2018-09-28 | End: 2018-09-28 | Stop reason: HOSPADM

## 2018-09-28 RX ORDER — ASPIRIN 325 MG
325 TABLET, DELAYED RELEASE (ENTERIC COATED) ORAL EVERY 12 HOURS SCHEDULED
Status: DISCONTINUED | OUTPATIENT
Start: 2018-09-29 | End: 2018-09-28 | Stop reason: HOSPADM

## 2018-09-28 RX ORDER — OXYCODONE AND ACETAMINOPHEN 7.5; 325 MG/1; MG/1
1 TABLET ORAL ONCE AS NEEDED
Status: DISCONTINUED | OUTPATIENT
Start: 2018-09-28 | End: 2018-09-28 | Stop reason: HOSPADM

## 2018-09-28 RX ORDER — ONDANSETRON 4 MG/1
4 TABLET, FILM COATED ORAL EVERY 6 HOURS PRN
Qty: 10 TABLET | Refills: 0 | Status: SHIPPED | OUTPATIENT
Start: 2018-09-28 | End: 2020-03-14 | Stop reason: HOSPADM

## 2018-09-28 RX ORDER — FLUMAZENIL 0.1 MG/ML
0.2 INJECTION INTRAVENOUS AS NEEDED
Status: DISCONTINUED | OUTPATIENT
Start: 2018-09-28 | End: 2018-09-28 | Stop reason: HOSPADM

## 2018-09-28 RX ORDER — HYDROCODONE BITARTRATE AND ACETAMINOPHEN 7.5; 325 MG/1; MG/1
1 TABLET ORAL EVERY 4 HOURS PRN
Status: DISCONTINUED | OUTPATIENT
Start: 2018-09-28 | End: 2018-09-28 | Stop reason: HOSPADM

## 2018-09-28 RX ORDER — EPHEDRINE SULFATE 50 MG/ML
5 INJECTION, SOLUTION INTRAVENOUS ONCE AS NEEDED
Status: DISCONTINUED | OUTPATIENT
Start: 2018-09-28 | End: 2018-09-28 | Stop reason: HOSPADM

## 2018-09-28 RX ORDER — HYDROCODONE BITARTRATE AND ACETAMINOPHEN 7.5; 325 MG/1; MG/1
2 TABLET ORAL EVERY 4 HOURS PRN
Status: DISCONTINUED | OUTPATIENT
Start: 2018-09-28 | End: 2018-09-28 | Stop reason: HOSPADM

## 2018-09-28 RX ORDER — SODIUM CHLORIDE 0.9 % (FLUSH) 0.9 %
1-10 SYRINGE (ML) INJECTION AS NEEDED
Status: DISCONTINUED | OUTPATIENT
Start: 2018-09-28 | End: 2018-09-28 | Stop reason: HOSPADM

## 2018-09-28 RX ORDER — PROMETHAZINE HYDROCHLORIDE 25 MG/1
25 SUPPOSITORY RECTAL ONCE AS NEEDED
Status: DISCONTINUED | OUTPATIENT
Start: 2018-09-28 | End: 2018-09-28 | Stop reason: HOSPADM

## 2018-09-28 RX ORDER — SODIUM CHLORIDE, SODIUM LACTATE, POTASSIUM CHLORIDE, CALCIUM CHLORIDE 600; 310; 30; 20 MG/100ML; MG/100ML; MG/100ML; MG/100ML
INJECTION, SOLUTION INTRAVENOUS CONTINUOUS PRN
Status: DISCONTINUED | OUTPATIENT
Start: 2018-09-28 | End: 2018-09-28 | Stop reason: SURG

## 2018-09-28 RX ORDER — MELOXICAM 15 MG/1
15 TABLET ORAL DAILY
Qty: 90 TABLET | Refills: 0 | OUTPATIENT
Start: 2018-09-28 | End: 2018-09-28 | Stop reason: HOSPADM

## 2018-09-28 RX ORDER — PSEUDOEPHEDRINE HCL 30 MG
100 TABLET ORAL 2 TIMES DAILY
Qty: 28 CAPSULE | Refills: 0 | Status: SHIPPED | OUTPATIENT
Start: 2018-09-28 | End: 2018-10-12

## 2018-09-28 RX ORDER — HYDROMORPHONE HYDROCHLORIDE 1 MG/ML
0.5 INJECTION, SOLUTION INTRAMUSCULAR; INTRAVENOUS; SUBCUTANEOUS
Status: DISCONTINUED | OUTPATIENT
Start: 2018-09-28 | End: 2018-09-28 | Stop reason: HOSPADM

## 2018-09-28 RX ORDER — CEFAZOLIN SODIUM 2 G/100ML
2 INJECTION, SOLUTION INTRAVENOUS EVERY 8 HOURS
Status: COMPLETED | OUTPATIENT
Start: 2018-09-28 | End: 2018-09-28

## 2018-09-28 RX ORDER — PROPOFOL 10 MG/ML
VIAL (ML) INTRAVENOUS AS NEEDED
Status: DISCONTINUED | OUTPATIENT
Start: 2018-09-28 | End: 2018-09-28 | Stop reason: SURG

## 2018-09-28 RX ORDER — PREGABALIN 75 MG/1
150 CAPSULE ORAL ONCE
Status: COMPLETED | OUTPATIENT
Start: 2018-09-28 | End: 2018-09-28

## 2018-09-28 RX ORDER — FINASTERIDE 5 MG/1
5 TABLET, FILM COATED ORAL DAILY
Status: DISCONTINUED | OUTPATIENT
Start: 2018-09-28 | End: 2018-09-28 | Stop reason: HOSPADM

## 2018-09-28 RX ORDER — MIDAZOLAM HYDROCHLORIDE 1 MG/ML
1 INJECTION INTRAMUSCULAR; INTRAVENOUS
Status: DISCONTINUED | OUTPATIENT
Start: 2018-09-28 | End: 2018-09-28 | Stop reason: HOSPADM

## 2018-09-28 RX ORDER — ONDANSETRON 2 MG/ML
INJECTION INTRAMUSCULAR; INTRAVENOUS AS NEEDED
Status: DISCONTINUED | OUTPATIENT
Start: 2018-09-28 | End: 2018-09-28 | Stop reason: SURG

## 2018-09-28 RX ORDER — LEVOTHYROXINE SODIUM 0.07 MG/1
75 TABLET ORAL EVERY MORNING
Status: DISCONTINUED | OUTPATIENT
Start: 2018-09-28 | End: 2018-09-28 | Stop reason: HOSPADM

## 2018-09-28 RX ORDER — MAGNESIUM HYDROXIDE 1200 MG/15ML
LIQUID ORAL AS NEEDED
Status: DISCONTINUED | OUTPATIENT
Start: 2018-09-28 | End: 2018-09-28 | Stop reason: HOSPADM

## 2018-09-28 RX ORDER — ONDANSETRON 2 MG/ML
4 INJECTION INTRAMUSCULAR; INTRAVENOUS EVERY 6 HOURS PRN
Status: DISCONTINUED | OUTPATIENT
Start: 2018-09-28 | End: 2018-09-28 | Stop reason: HOSPADM

## 2018-09-28 RX ORDER — LIDOCAINE HYDROCHLORIDE 20 MG/ML
INJECTION, SOLUTION INFILTRATION; PERINEURAL AS NEEDED
Status: DISCONTINUED | OUTPATIENT
Start: 2018-09-28 | End: 2018-09-28 | Stop reason: SURG

## 2018-09-28 RX ORDER — ONDANSETRON 4 MG/1
4 TABLET, ORALLY DISINTEGRATING ORAL EVERY 6 HOURS PRN
Status: DISCONTINUED | OUTPATIENT
Start: 2018-09-28 | End: 2018-09-28 | Stop reason: HOSPADM

## 2018-09-28 RX ORDER — MIDAZOLAM HYDROCHLORIDE 1 MG/ML
2 INJECTION INTRAMUSCULAR; INTRAVENOUS
Status: DISCONTINUED | OUTPATIENT
Start: 2018-09-28 | End: 2018-09-28 | Stop reason: HOSPADM

## 2018-09-28 RX ORDER — MEPERIDINE HYDROCHLORIDE 25 MG/ML
12.5 INJECTION INTRAMUSCULAR; INTRAVENOUS; SUBCUTANEOUS
Status: DISCONTINUED | OUTPATIENT
Start: 2018-09-28 | End: 2018-09-28 | Stop reason: HOSPADM

## 2018-09-28 RX ORDER — LABETALOL HYDROCHLORIDE 5 MG/ML
5 INJECTION, SOLUTION INTRAVENOUS
Status: DISCONTINUED | OUTPATIENT
Start: 2018-09-28 | End: 2018-09-28 | Stop reason: HOSPADM

## 2018-09-28 RX ORDER — NALOXONE HCL 0.4 MG/ML
0.2 VIAL (ML) INJECTION AS NEEDED
Status: DISCONTINUED | OUTPATIENT
Start: 2018-09-28 | End: 2018-09-28 | Stop reason: HOSPADM

## 2018-09-28 RX ORDER — PANTOPRAZOLE SODIUM 40 MG/1
40 TABLET, DELAYED RELEASE ORAL DAILY
Qty: 14 TABLET | Refills: 0 | Status: SHIPPED | OUTPATIENT
Start: 2018-09-28 | End: 2018-09-28 | Stop reason: SDUPTHER

## 2018-09-28 RX ORDER — FAMOTIDINE 10 MG/ML
20 INJECTION, SOLUTION INTRAVENOUS ONCE
Status: COMPLETED | OUTPATIENT
Start: 2018-09-28 | End: 2018-09-28

## 2018-09-28 RX ORDER — PROMETHAZINE HYDROCHLORIDE 12.5 MG/1
12.5 TABLET ORAL EVERY 6 HOURS PRN
Status: DISCONTINUED | OUTPATIENT
Start: 2018-09-28 | End: 2018-09-28 | Stop reason: HOSPADM

## 2018-09-28 RX ORDER — PROMETHAZINE HYDROCHLORIDE 25 MG/ML
12.5 INJECTION, SOLUTION INTRAMUSCULAR; INTRAVENOUS ONCE AS NEEDED
Status: DISCONTINUED | OUTPATIENT
Start: 2018-09-28 | End: 2018-09-28 | Stop reason: HOSPADM

## 2018-09-28 RX ORDER — PROMETHAZINE HYDROCHLORIDE 25 MG/ML
12.5 INJECTION, SOLUTION INTRAMUSCULAR; INTRAVENOUS EVERY 6 HOURS PRN
Status: DISCONTINUED | OUTPATIENT
Start: 2018-09-28 | End: 2018-09-28 | Stop reason: HOSPADM

## 2018-09-28 RX ORDER — KETOROLAC TROMETHAMINE 30 MG/ML
30 INJECTION, SOLUTION INTRAMUSCULAR; INTRAVENOUS EVERY 8 HOURS
Status: DISCONTINUED | OUTPATIENT
Start: 2018-09-28 | End: 2018-09-28 | Stop reason: HOSPADM

## 2018-09-28 RX ORDER — MELOXICAM 15 MG/1
15 TABLET ORAL ONCE
Status: COMPLETED | OUTPATIENT
Start: 2018-09-28 | End: 2018-09-28

## 2018-09-28 RX ORDER — SODIUM CHLORIDE, SODIUM LACTATE, POTASSIUM CHLORIDE, CALCIUM CHLORIDE 600; 310; 30; 20 MG/100ML; MG/100ML; MG/100ML; MG/100ML
9 INJECTION, SOLUTION INTRAVENOUS CONTINUOUS
Status: DISCONTINUED | OUTPATIENT
Start: 2018-09-28 | End: 2018-09-28 | Stop reason: HOSPADM

## 2018-09-28 RX ORDER — PROMETHAZINE HYDROCHLORIDE 25 MG/1
25 TABLET ORAL ONCE AS NEEDED
Status: DISCONTINUED | OUTPATIENT
Start: 2018-09-28 | End: 2018-09-28 | Stop reason: HOSPADM

## 2018-09-28 RX ORDER — ACETAMINOPHEN 10 MG/ML
1000 INJECTION, SOLUTION INTRAVENOUS ONCE
Status: DISCONTINUED | OUTPATIENT
Start: 2018-09-28 | End: 2018-09-28 | Stop reason: HOSPADM

## 2018-09-28 RX ORDER — ONDANSETRON 4 MG/1
4 TABLET, FILM COATED ORAL EVERY 6 HOURS PRN
Status: DISCONTINUED | OUTPATIENT
Start: 2018-09-28 | End: 2018-09-28 | Stop reason: HOSPADM

## 2018-09-28 RX ORDER — PANTOPRAZOLE SODIUM 40 MG/1
40 TABLET, DELAYED RELEASE ORAL DAILY
Qty: 90 TABLET | Refills: 0 | OUTPATIENT
Start: 2018-09-28 | End: 2018-09-28 | Stop reason: HOSPADM

## 2018-09-28 RX ORDER — LIDOCAINE HYDROCHLORIDE 10 MG/ML
0.5 INJECTION, SOLUTION EPIDURAL; INFILTRATION; INTRACAUDAL; PERINEURAL ONCE AS NEEDED
Status: DISCONTINUED | OUTPATIENT
Start: 2018-09-28 | End: 2018-09-28 | Stop reason: HOSPADM

## 2018-09-28 RX ORDER — MELOXICAM 15 MG/1
15 TABLET ORAL DAILY
Status: DISCONTINUED | OUTPATIENT
Start: 2018-09-28 | End: 2018-09-28 | Stop reason: HOSPADM

## 2018-09-28 RX ORDER — ONDANSETRON 2 MG/ML
4 INJECTION INTRAMUSCULAR; INTRAVENOUS ONCE AS NEEDED
Status: DISCONTINUED | OUTPATIENT
Start: 2018-09-28 | End: 2018-09-28 | Stop reason: HOSPADM

## 2018-09-28 RX ORDER — HYDROCODONE BITARTRATE AND ACETAMINOPHEN 7.5; 325 MG/1; MG/1
1 TABLET ORAL ONCE AS NEEDED
Status: DISCONTINUED | OUTPATIENT
Start: 2018-09-28 | End: 2018-09-28 | Stop reason: HOSPADM

## 2018-09-28 RX ORDER — DOCUSATE SODIUM 100 MG/1
100 CAPSULE, LIQUID FILLED ORAL 2 TIMES DAILY
Status: DISCONTINUED | OUTPATIENT
Start: 2018-09-28 | End: 2018-09-28 | Stop reason: HOSPADM

## 2018-09-28 RX ORDER — MELOXICAM 15 MG/1
15 TABLET ORAL DAILY
Qty: 30 TABLET | Refills: 0 | Status: SHIPPED | OUTPATIENT
Start: 2018-09-28 | End: 2018-09-28 | Stop reason: SDUPTHER

## 2018-09-28 RX ADMIN — FENTANYL CITRATE 50 MCG: 50 INJECTION, SOLUTION INTRAMUSCULAR; INTRAVENOUS at 08:35

## 2018-09-28 RX ADMIN — VANCOMYCIN HYDROCHLORIDE 1500 MG: 10 INJECTION, POWDER, LYOPHILIZED, FOR SOLUTION INTRAVENOUS at 06:03

## 2018-09-28 RX ADMIN — SODIUM CHLORIDE, POTASSIUM CHLORIDE, SODIUM LACTATE AND CALCIUM CHLORIDE: 600; 310; 30; 20 INJECTION, SOLUTION INTRAVENOUS at 06:33

## 2018-09-28 RX ADMIN — MIDAZOLAM HYDROCHLORIDE 2 MG: 2 INJECTION, SOLUTION INTRAMUSCULAR; INTRAVENOUS at 06:38

## 2018-09-28 RX ADMIN — MELOXICAM 15 MG: 15 TABLET ORAL at 06:02

## 2018-09-28 RX ADMIN — CEFAZOLIN SODIUM 2 G: 2 INJECTION, SOLUTION INTRAVENOUS at 14:32

## 2018-09-28 RX ADMIN — PROPOFOL 150 MG: 10 INJECTION, EMULSION INTRAVENOUS at 07:00

## 2018-09-28 RX ADMIN — ONDANSETRON 4 MG: 2 INJECTION INTRAMUSCULAR; INTRAVENOUS at 08:03

## 2018-09-28 RX ADMIN — DEXAMETHASONE SODIUM PHOSPHATE 8 MG: 10 INJECTION INTRAMUSCULAR; INTRAVENOUS at 07:00

## 2018-09-28 RX ADMIN — SODIUM CHLORIDE, POTASSIUM CHLORIDE, SODIUM LACTATE AND CALCIUM CHLORIDE: 600; 310; 30; 20 INJECTION, SOLUTION INTRAVENOUS at 08:19

## 2018-09-28 RX ADMIN — SODIUM CHLORIDE, POTASSIUM CHLORIDE, SODIUM LACTATE AND CALCIUM CHLORIDE 500 ML: 600; 310; 30; 20 INJECTION, SOLUTION INTRAVENOUS at 05:54

## 2018-09-28 RX ADMIN — FENTANYL CITRATE 50 MCG: 50 INJECTION, SOLUTION INTRAMUSCULAR; INTRAVENOUS at 08:50

## 2018-09-28 RX ADMIN — FAMOTIDINE 20 MG: 10 INJECTION, SOLUTION INTRAVENOUS at 06:37

## 2018-09-28 RX ADMIN — LIDOCAINE HYDROCHLORIDE 100 MG: 20 INJECTION, SOLUTION INFILTRATION; PERINEURAL at 07:00

## 2018-09-28 RX ADMIN — HYDROCODONE BITARTRATE AND ACETAMINOPHEN 1 TABLET: 7.5; 325 TABLET ORAL at 16:50

## 2018-09-28 RX ADMIN — HYDROMORPHONE HYDROCHLORIDE 0.5 MG: 1 INJECTION, SOLUTION INTRAMUSCULAR; INTRAVENOUS; SUBCUTANEOUS at 08:57

## 2018-09-28 RX ADMIN — FENTANYL CITRATE 50 MCG: 50 INJECTION INTRAMUSCULAR; INTRAVENOUS at 06:37

## 2018-09-28 RX ADMIN — TRANEXAMIC ACID 1000 MG: 100 INJECTION, SOLUTION INTRAVENOUS at 07:04

## 2018-09-28 RX ADMIN — ONDANSETRON 4 MG: 2 INJECTION INTRAMUSCULAR; INTRAVENOUS at 12:58

## 2018-09-28 RX ADMIN — SODIUM CHLORIDE, POTASSIUM CHLORIDE, SODIUM LACTATE AND CALCIUM CHLORIDE 9 ML/HR: 600; 310; 30; 20 INJECTION, SOLUTION INTRAVENOUS at 06:38

## 2018-09-28 RX ADMIN — FENTANYL CITRATE 50 MCG: 50 INJECTION INTRAMUSCULAR; INTRAVENOUS at 07:17

## 2018-09-28 RX ADMIN — KETOROLAC TROMETHAMINE 15 MG: 30 INJECTION, SOLUTION INTRAMUSCULAR at 09:27

## 2018-09-28 RX ADMIN — PREGABALIN 150 MG: 75 CAPSULE ORAL at 06:02

## 2018-09-28 RX ADMIN — TRANEXAMIC ACID 1000 MG: 100 INJECTION, SOLUTION INTRAVENOUS at 08:00

## 2018-09-28 RX ADMIN — HYDROMORPHONE HYDROCHLORIDE 0.5 MG: 1 INJECTION, SOLUTION INTRAMUSCULAR; INTRAVENOUS; SUBCUTANEOUS at 09:12

## 2018-09-28 RX ADMIN — PROMETHAZINE HYDROCHLORIDE 12.5 MG: 25 INJECTION INTRAMUSCULAR; INTRAVENOUS at 14:06

## 2018-09-28 RX ADMIN — CEFAZOLIN SODIUM 2 G: 2 INJECTION, SOLUTION INTRAVENOUS at 06:57

## 2018-09-28 NOTE — ANESTHESIA PROCEDURE NOTES
Peripheral Block    Pre-sedation assessment completed: 9/28/2018 6:35 AM    Patient reassessed immediately prior to procedure    Patient location during procedure: holding area  Start time: 9/28/2018 6:35 AM  Stop time: 9/28/2018 6:42 AM  Reason for block: at surgeon's request and post-op pain management  Performed by  Anesthesiologist: HIEU YATES  Preanesthetic Checklist  Completed: patient identified, site marked, surgical consent, pre-op evaluation, timeout performed, IV checked, risks and benefits discussed and monitors and equipment checked  Prep:  Pt Position: supine  Sterile barriers:cap, gloves and mask  Prep: ChloraPrep  Patient monitoring: blood pressure monitoring, continuous pulse oximetry and EKG  Procedure  Sedation:yes  Performed under: local infiltration  Guidance:ultrasound guided  ULTRASOUND INTERPRETATION.  Using ultrasound guidance a 22 G (22G needle for placement of 3cc 2%lido to site prior to start of procedure.) gauge needle was placed in close proximity to the femoral nerve, at which point, under ultrasound guidance anesthetic was injected in the area of the nerve and spread of the anesthesia was seen on ultrasound in close proximity thereto.  There were no abnormalities seen on ultrasound; a digital image was taken; and the patient tolerated the procedure with no complications. Images:still images obtained    Laterality:left  Block Type:adductor canal block  Injection Technique:single-shot  Needle Type:echogenic  Needle Gauge:22 G  Resistance on Injection: none  Medications  Local Injected:ropivacaine 0.5% without epinephrine Local Amount Injected:30mL  Post Assessment  Injection Assessment: negative aspiration for heme, no paresthesia on injection and incremental injection  Patient Tolerance:comfortable throughout block  Complications:no

## 2018-09-28 NOTE — ANESTHESIA POSTPROCEDURE EVALUATION
Patient: Asa Magaña    Procedure Summary     Date:  09/28/18 Room / Location:  [facility] OR [address] [place] [address] MAIN OR    Anesthesia Start:  0654 Anesthesia Stop:  0828    Procedure:  KNEE ARTHROPLASTY UNICOMPARTMENTAL (Left Knee) Diagnosis:       Primary osteoarthritis of left knee      (Primary osteoarthritis of left knee [M17.12])    Surgeon:  Davin Beasley MD Provider:  Evan Baires MD    Anesthesia Type:  general ASA Status:  2          Anesthesia Type: general  Last vitals  BP   149/95 (09/28/18 0945)   Temp   36.5 °C (97.7 °F) (09/28/18 0826)   Pulse   69 (09/28/18 0945)   Resp   16 (09/28/18 0945)     SpO2   99 % (09/28/18 0945)     Post Anesthesia Care and Evaluation    Patient location during evaluation: PACU  Patient participation: complete - patient participated  Level of consciousness: awake and alert  Pain management: adequate  Airway patency: patent  Anesthetic complications: No anesthetic complications    Cardiovascular status: acceptable  Respiratory status: acceptable  Hydration status: acceptable    Comments: /95   Pulse 69   Temp 36.5 °C (97.7 °F) (Oral)   Resp 16   SpO2 99%

## 2018-09-28 NOTE — ANESTHESIA PREPROCEDURE EVALUATION
Anesthesia Evaluation     Patient summary reviewed and Nursing notes reviewed   no history of anesthetic complications:  NPO Solid Status: > 8 hours  NPO Liquid Status: > 2 hours           Airway   Mallampati: III  TM distance: >3 FB  Neck ROM: full  Possible difficult intubation and Anterior  Dental - normal exam     Pulmonary - normal exam   (+) pneumonia resolved , asthma, sleep apnea,   (-) COPD, not a smoker, lung cancer  Cardiovascular - normal exam  Exercise tolerance: excellent (>7 METS)    ECG reviewed  Rhythm: regular  Rate: normal    (+) hyperlipidemia,   (-) hypertension, valvular problems/murmurs, past MI, CAD, cardiac stents      Neuro/Psych  (+) headaches,     (-) seizures, TIA, CVA  GI/Hepatic/Renal/Endo    (+)   renal disease stones, hypothyroidism,   (-) PUD, hepatitis, liver disease, diabetes    Musculoskeletal     Abdominal  - normal exam   Substance History - negative use     OB/GYN negative ob/gyn ROS         Other   (+) arthritis                   Anesthesia Plan    ASA 2     general   (GA w/ AC block  )  intravenous induction   Anesthetic plan, all risks, benefits, and alternatives have been provided, discussed and informed consent has been obtained with: patient and spouse/significant other.    Plan discussed with CRNA and attending.

## 2018-09-28 NOTE — ANESTHESIA PROCEDURE NOTES
Airway  Urgency: elective    Date/Time: 9/28/2018 7:01 AM  Airway not difficult    General Information and Staff    Patient location during procedure: OR  Anesthesiologist: HIEU YATES  CRNA: ANDREA HARRIS    Indications and Patient Condition  Indications for airway management: airway protection    Preoxygenated: yes  MILS not maintained throughout  Mask difficulty assessment: 1 - vent by mask    Final Airway Details  Final airway type: supraglottic airway      Successful airway: unique  Size 5    Number of attempts at approach: 1    Additional Comments  Pre O2, SIAI

## 2018-09-28 NOTE — H&P
Patient: Asa Magaña    Date of Admission: 9/28/18    YOB: 1940    Medical Record Number: [id number]    Admitting Physician: Dr. Davin Beasley    Reason for Admission: End Stage Left Medial Knee OA    History of Present Illness: 78 y.o. male presents with severe end stage medial compartment knee osteoarthritis which has not been responsive to the full compliment of conservative measures. Despite conservative attempts, there is still severe, constant activity limiting pain. Given the severity of the pain, the patient has elected to proceed with knee replacement.    Allergies:   Allergies   Allergen Reactions   • Ciprofloxacin Hives and Palpitations   • Quinolones Hives and Palpitations         Current Medications:  Scheduled Meds:  PRN Meds:.    PMH:  Past Medical History:   Diagnosis Date   • Asthma    • BPH (benign prostatic hyperplasia)    • History of kidney stones    • History of sepsis 02/2017    BLADDER INFECTION AND PNEUMONIA BOTH TWICE   • Hx of migraine headaches    • Hyperlipidemia    • Hypothyroidism    • Knee pain    • Skin abrasion     LEFT FOREARM THAT HAS NOW HEALED       PSurg/Soc/Fam Hx:     Past Surgical History:   Procedure Laterality Date   • COLONOSCOPY  2016    benign polyp-repeat 5 years- Filemon Beasley M.D.   • CYSTOSCOPY TRANSURETHRAL RESECTION OF PROSTATE     • CYSTOSCOPY W/ URETERAL STENT PLACEMENT Right 1/20/2017    Procedure: CYSTOSCOPY, ureteroscopy,  URETERAL STENT INSERTION, RT;  Surgeon: Shashi Arciniega MD;  Location: [facility];  Service:    • HERNIA REPAIR     • KNEE SURGERY      KNEE ARTHROSCOPY X3   • THYROIDECTOMY          Social History     Occupational History   • Not on file.     Social History Main Topics   • Smoking status: Never Smoker   • Smokeless tobacco: Never Used   • Alcohol use Yes      Comment: seldom   • Drug use: No   • Sexual activity: Defer      Social History     Social History Narrative   • No narrative on file        Family History  "  Problem Relation Age of Onset   • Diabetes Mother    • Heart disease Mother    • Diabetes Father    • Heart disease Father    • Malig Hyperthermia Neg Hx          Review of Systems:   A 14 point review of systems was performed, pertinent positives discussed above, all other systems are negative    Physical Exam: 78 y.o. male  Vital Signs :   Vitals:    09/27/18 1434   BP: 120/70   BP Location: Left arm   Patient Position: Sitting   Cuff Size: Adult   Temp: 98.8 °F (37.1 °C)   TempSrc: Temporal Artery    Weight: 98 kg (216 lb)   Height: 182.9 cm (72\")     General: Alert and Oriented x 3, No acute distress.  Psych: mood and affect appropriate; recent and remote memory intact  Eyes: conjunctiva clear; pupils equally round and reactive, sclera nonicteric  CV: no peripheral edema  Resp: normal respiratory effort  Skin: no rashes or wounds; normal turgor  Musculosketetal: pain localized to the medial aspect of the knee. Neutral alignment in stance. No patellofemoral crepitance or pain with patellofemoral grind. Negative Lachman  Vascular: palpable distal pulses    Xrays:  -3 views (AP, lateral, and sunrise) were reviewed demonstrating end-stage isolated medial compartment OA with medial bone on bone articulation. The lateral and patellofemoral compartments are well preserved.  -A full length AP xray was ordered today for purposes of operative alignment demonstrating end stage medial compartment arthritic findings. There are no previous full length films for review    Assessment:  End-stage Left knee medial compartment osteoarthritis. Conservative measures have failed.      Plan:  The plan is to proceed with Left Unicompartmental Knee Replacement possible Total Knee Replacement. The patient voiced understanding of the risks, benefits, and alternative forms of treatment that were discussed with Dr Beasley at the time of scheduling.  Patient is planning on going home with home health same day    Fe Coker, " APRN  9/28/2018

## 2018-10-01 ENCOUNTER — TELEPHONE (OUTPATIENT)
Dept: ORTHOPEDIC SURGERY | Facility: CLINIC | Age: 78
End: 2018-10-01

## 2018-10-01 DIAGNOSIS — Z96.652 S/P LEFT UNICOMPARTMENTAL KNEE REPLACEMENT: Primary | ICD-10-CM

## 2018-10-01 NOTE — TELEPHONE ENCOUNTER
Patient plans on traveling to [place] on Thursday and Friday wants to know if this okay.     Can you route the message to someone else I am leaving for the day.

## 2018-10-01 NOTE — TELEPHONE ENCOUNTER
[facility]    Home health PT has not yet been to the patient house.     he PRIMITIVO dressing  start beeping around 10 Am and stopped and started working properly around 1230.     Patient states redness around the bandage area.

## 2018-10-01 NOTE — PROGRESS NOTES
Case Management Discharge Note    Final Note: S/w [facility] HAND pt was not see prior to d/c. Estephania s/w pt and notified [facility].     Destination     No service has been selected for the patient.      Durable Medical Equipment     No service has been selected for the patient.      Dialysis/Infusion     No service has been selected for the patient.      Home Medical Care - Selection Complete     Service Request Status Selected Specialties Address Phone Number Fax Number    [facility] Selected Home Health Services [address] [address] [phone]      Social Care     No service has been selected for the patient.             Final Discharge Disposition Code: 06 - home with home health care

## 2018-10-01 NOTE — TELEPHONE ENCOUNTER
Have spoken with the patient regarding his home PT.  Does have an order for home health to be set up however when I spoke with the discharge planner she was not aware of it and those therapy was not set up.  I after discussing with the patient he is [facility] which is about a mile or so from home and would prefer to do outpatient PT.  Orders have been placed and of ask him to contact them to set up the appointment

## 2018-10-04 ENCOUNTER — HOSPITAL ENCOUNTER (OUTPATIENT)
Dept: CARDIOLOGY | Facility: HOSPITAL | Age: 78
Discharge: HOME | End: 2018-10-04
Attending: ORTHOPAEDIC SURGERY | Admitting: ORTHOPAEDIC SURGERY

## 2018-10-04 ENCOUNTER — TELEPHONE (OUTPATIENT)
Dept: ORTHOPEDIC SURGERY | Facility: CLINIC | Age: 78
End: 2018-10-04

## 2018-10-04 ENCOUNTER — HOSPITAL ENCOUNTER (OUTPATIENT)
Dept: CARDIOLOGY | Facility: HOSPITAL | Age: 78
End: 2018-10-04
Attending: ORTHOPAEDIC SURGERY

## 2018-10-04 ENCOUNTER — TRANSCRIBE ORDERS (OUTPATIENT)
Dept: ADMINISTRATIVE | Facility: HOSPITAL | Age: 78
End: 2018-10-04

## 2018-10-04 DIAGNOSIS — M79.89 LEG SWELLING: Primary | ICD-10-CM

## 2018-10-04 DIAGNOSIS — M79.89 LEG SWELLING: ICD-10-CM

## 2018-10-04 LAB

## 2018-10-04 PROCEDURE — 93970 EXTREMITY STUDY: CPT

## 2018-10-04 NOTE — TELEPHONE ENCOUNTER
S/P LEFT UKA by EDY on 9/28/18. Today patient noticed a soft-ball sized dark purple bruise on the inside of his lower left thigh. He says his calves are tight and his ankles are swollen with slight redness.

## 2018-10-04 NOTE — TELEPHONE ENCOUNTER
Per MLL, need to send patient for a STAT venous doppler.     Order has been placed and I got it scheduled for today and they asked for the patient to go ahead and come to the hospital as soon as possible.     I called the patient to advise of this information. He understands and I explained to him where to go at the hospital.

## 2018-10-12 ENCOUNTER — OFFICE VISIT (OUTPATIENT)
Dept: ORTHOPEDIC SURGERY | Facility: CLINIC | Age: 78
End: 2018-10-12

## 2018-10-12 VITALS — TEMPERATURE: 98.7 F | WEIGHT: 220 LBS | BODY MASS INDEX: 29.8 KG/M2 | HEIGHT: 72 IN

## 2018-10-12 DIAGNOSIS — Z96.652 S/P LEFT UNICOMPARTMENTAL KNEE REPLACEMENT: Primary | ICD-10-CM

## 2018-10-12 PROCEDURE — 99024 POSTOP FOLLOW-UP VISIT: CPT | Performed by: NURSE PRACTITIONER

## 2018-10-12 RX ORDER — HYDROCODONE BITARTRATE AND ACETAMINOPHEN 7.5; 325 MG/1; MG/1
TABLET ORAL
Qty: 30 TABLET | Refills: 0 | Status: SHIPPED | OUTPATIENT
Start: 2018-10-12 | End: 2018-12-14

## 2018-10-12 NOTE — PROGRESS NOTES
Asa Magaña : 1940 MRN: [id number] DATE: 10/12/2018    DIAGNOSIS: 2 week follow up left total knee      SUBJECTIVE:Patient returns today for 2 week follow up of left total knee replacement. Patient reports doing well with no unusual complaints. Appears to be progressing appropriately.    OBJECTIVE:   Exam:. The incision is healing appropriately. No sign of infection. Range of motion is progressing as expected. The calf is soft and nontender with a negative Homans sign.    ASSESSMENT: 2 week status post left knee replacement.    PLAN: 1) Staples removed and steri strips applied   2) Order given for PT   3) Discontinue WONG hose   4) Continue ice PRN   5) aspirin 325 mg orally every day for 1 month   6) Follow up in 6 weeks with repeat Xrays of left knee (3views)    eF Coker, APRN  10/12/2018

## 2018-11-13 RX ORDER — HYDROCODONE BITARTRATE AND ACETAMINOPHEN 7.5; 325 MG/1; MG/1
TABLET ORAL
Qty: 30 TABLET | Refills: 0 | OUTPATIENT
Start: 2018-11-13

## 2018-11-16 ENCOUNTER — OFFICE VISIT (OUTPATIENT)
Dept: ORTHOPEDIC SURGERY | Facility: CLINIC | Age: 78
End: 2018-11-16

## 2018-11-16 VITALS — BODY MASS INDEX: 29.28 KG/M2 | WEIGHT: 216.2 LBS | HEIGHT: 72 IN | TEMPERATURE: 98.4 F

## 2018-11-16 DIAGNOSIS — Z96.652 S/P TOTAL KNEE ARTHROPLASTY, LEFT: Primary | ICD-10-CM

## 2018-11-16 PROCEDURE — 99024 POSTOP FOLLOW-UP VISIT: CPT | Performed by: NURSE PRACTITIONER

## 2018-11-16 PROCEDURE — 73562 X-RAY EXAM OF KNEE 3: CPT | Performed by: NURSE PRACTITIONER

## 2018-11-16 RX ORDER — CEPHALEXIN 500 MG/1
CAPSULE ORAL
Qty: 4 CAPSULE | Refills: 5 | Status: SHIPPED | OUTPATIENT
Start: 2018-11-16 | End: 2018-12-14

## 2018-11-16 NOTE — PROGRESS NOTES
Asa Magaña : 1940 MRN: [id number] DATE: 2018    DIAGNOSIS: 8 week follow up left UKA     SUBJECTIVE:Patient returns today for 8 week follow up of left UKA. Patient reports doing well with no unusual complaints. Appears to be progressing appropriately.    OBJECTIVE:   Exam:. The incision is well healed. No sign of infection. Range of motion is measured at 0 to 120. The calf is soft and nontender with a negative Homans sign. Strength is progressing and the patient is ambulating appropriately.    DIAGNOSTIC STUDIES  Xrays: 3 views of the left knee (AP, lateral, and sunrise) were ordered and reviewed for evaluation of recent knee replacement. They demonstrate a well positioned, well aligned knee replacement without complicating factors noted. In comparison with previous films there has been no change.    ASSESSMENT: 8 week status post left UKA.    PLAN: 1) Continue with PT exercises as prescribed   2) Follow up in 1 month to discuss right knee surgery    Fe Coker, APRN  2018

## 2018-12-14 ENCOUNTER — OFFICE VISIT (OUTPATIENT)
Dept: ORTHOPEDIC SURGERY | Facility: CLINIC | Age: 78
End: 2018-12-14

## 2018-12-14 VITALS — BODY MASS INDEX: 29.12 KG/M2 | WEIGHT: 215 LBS | HEIGHT: 72 IN | TEMPERATURE: 98.8 F

## 2018-12-14 DIAGNOSIS — Z96.652 S/P LEFT UNICOMPARTMENTAL KNEE REPLACEMENT: Primary | ICD-10-CM

## 2018-12-14 PROCEDURE — 73562 X-RAY EXAM OF KNEE 3: CPT | Performed by: ORTHOPAEDIC SURGERY

## 2018-12-14 PROCEDURE — 99024 POSTOP FOLLOW-UP VISIT: CPT | Performed by: ORTHOPAEDIC SURGERY

## 2018-12-14 NOTE — PROGRESS NOTES
Patient: Asa Magaña  YOB: 1940 78 y.o. male  Medical Record Number: [id number]    Chief Complaints:   Chief Complaint   Patient presents with   • Left Knee - Post-op, Follow-up       History of Present Illness:Asa Magaña is a 78 y.o. male who presents for follow-up of  bilateral knees left knee uni-continues to improve still has some discomfort having trouble getting up out of chairs for this because of the right knee as well.  He has medial knee pain which is limiting him and he continues to work therapy on that as well.  He describes an aching pain on the medial joint worse with weightbearing.    Allergies:   Allergies   Allergen Reactions   • Ciprofloxacin Hives and Palpitations   • Quinolones Hives and Palpitations       Medications:   Current Outpatient Medications   Medication Sig Dispense Refill   • acetaminophen (TYLENOL) 325 MG tablet Take 2 tablets by mouth Every 4 (Four) Hours As Needed for Mild Pain (1-3).  0   • finasteride (PROSCAR) 5 MG tablet Take 5 mg by mouth Daily.     • levothyroxine (SYNTHROID, LEVOTHROID) 75 MCG tablet Take 75 mcg by mouth Every Morning.     • ondansetron (ZOFRAN) 4 MG tablet Take 1 tablet by mouth Every 6 (Six) Hours As Needed for Nausea or Vomiting for up to 10 doses. 10 tablet 0   • rosuvastatin (CRESTOR) 5 MG tablet Take 5 mg by mouth Every Night.     • SUMAtriptan (IMITREX) 50 MG tablet Take one tablet at onset of headache. May repeat dose one time in 2 hours if headache not relieved. (Patient taking differently: Take 50 mg by mouth 1 (One) Time As Needed. Take one tablet at onset of headache. May repeat dose one time in 2 hours if headache not relieved.)  0     No current facility-administered medications for this visit.      Facility-Administered Medications Ordered in Other Visits   Medication Dose Route Frequency Provider Last Rate Last Dose   • mupirocin (BACTROBAN) 2 % nasal ointment   Nasal BID Davin Beasley MD             The following  "portions of the patient's history were reviewed and updated as appropriate: allergies, current medications, past family history, past medical history, past social history, past surgical history and problem list.    Review of Systems:   A 14 point review of systems was performed. All systems negative except pertinent positives/negative listed in HPI above    Physical Exam:   Vitals:    12/14/18 1514   Temp: 98.8 °F (37.1 °C)   TempSrc: Temporal   Weight: 97.5 kg (215 lb)   Height: 182.9 cm (72\")       General: A and O x 3, ASA, NAD    SCLERA:    Normal    DENTITION:   Normal  Healed left knee incision good range of motion 0-120 known stability little slow to arise from the chair he has right knee pain and crepitus medially with range of motion trace effusion mild pseudo-laxity of the MCL    Radiology:  Xrays 3views left  Knee (ap,lateral, sunrise) were ordered and reviewed for evaluation of knee pain demonstrating positioned unicompartmental knee replacement.  The right knee shows advanced bone-on-bone medial compartment arthritis.  In comparison with postop films the unit compartmental is unchanged      Assessment/Plan:  Left unicompartmental replacement doing well continues to improve continue with therapy exercises.  He will also work therapy exercises on the right knee.  He does have advanced medial compartment ostial arthritis we may need to proceed with right unicompartmental replacement in the future.  I'll see him back as needed.  "

## 2019-02-18 VITALS
RESPIRATION RATE: 18 BRPM | HEART RATE: 64 BPM | RESPIRATION RATE: 14 BRPM | SYSTOLIC BLOOD PRESSURE: 113 MMHG | HEART RATE: 70 BPM | OXYGEN SATURATION: 98 % | HEART RATE: 67 BPM | DIASTOLIC BLOOD PRESSURE: 64 MMHG | OXYGEN SATURATION: 99 % | DIASTOLIC BLOOD PRESSURE: 73 MMHG | OXYGEN SATURATION: 95 % | SYSTOLIC BLOOD PRESSURE: 121 MMHG | WEIGHT: 215 LBS | DIASTOLIC BLOOD PRESSURE: 68 MMHG | HEART RATE: 69 BPM | RESPIRATION RATE: 16 BRPM | SYSTOLIC BLOOD PRESSURE: 128 MMHG | TEMPERATURE: 97 F | RESPIRATION RATE: 21 BRPM | TEMPERATURE: 97.2 F | DIASTOLIC BLOOD PRESSURE: 80 MMHG | DIASTOLIC BLOOD PRESSURE: 83 MMHG | SYSTOLIC BLOOD PRESSURE: 135 MMHG | RESPIRATION RATE: 13 BRPM | RESPIRATION RATE: 95 BRPM | SYSTOLIC BLOOD PRESSURE: 126 MMHG | HEART RATE: 71 BPM | HEART RATE: 66 BPM | SYSTOLIC BLOOD PRESSURE: 112 MMHG | SYSTOLIC BLOOD PRESSURE: 116 MMHG | SYSTOLIC BLOOD PRESSURE: 102 MMHG | HEART RATE: 74 BPM | HEART RATE: 65 BPM | DIASTOLIC BLOOD PRESSURE: 71 MMHG | SYSTOLIC BLOOD PRESSURE: 147 MMHG | RESPIRATION RATE: 15 BRPM | DIASTOLIC BLOOD PRESSURE: 62 MMHG | SYSTOLIC BLOOD PRESSURE: 157 MMHG

## 2019-02-19 ENCOUNTER — AMBULATORY SURGICAL CENTER (AMBULATORY)
Dept: URBAN - METROPOLITAN AREA SURGERY 17 | Facility: SURGERY | Age: 79
End: 2019-02-19
Payer: MEDICARE

## 2019-02-19 ENCOUNTER — OFFICE (AMBULATORY)
Dept: URBAN - METROPOLITAN AREA PATHOLOGY 4 | Facility: PATHOLOGY | Age: 79
End: 2019-02-19
Payer: MEDICARE

## 2019-02-19 DIAGNOSIS — D12.0 BENIGN NEOPLASM OF CECUM: ICD-10-CM

## 2019-02-19 DIAGNOSIS — K57.30 DIVERTICULOSIS OF LARGE INTESTINE WITHOUT PERFORATION OR ABS: ICD-10-CM

## 2019-02-19 DIAGNOSIS — K63.5 POLYP OF COLON: ICD-10-CM

## 2019-02-19 DIAGNOSIS — Z86.010 PERSONAL HISTORY OF COLONIC POLYPS: ICD-10-CM

## 2019-02-19 DIAGNOSIS — K64.8 OTHER HEMORRHOIDS: ICD-10-CM

## 2019-02-19 PROBLEM — D12.5 BENIGN NEOPLASM OF SIGMOID COLON: Status: ACTIVE | Noted: 2019-02-19

## 2019-02-19 LAB
GI HISTOLOGY: A. UNSPECIFIED: (no result)
GI HISTOLOGY: B. UNSPECIFIED: (no result)
GI HISTOLOGY: PDF REPORT: (no result)

## 2019-02-19 PROCEDURE — 88305 TISSUE EXAM BY PATHOLOGIST: CPT | Performed by: INTERNAL MEDICINE

## 2019-02-19 PROCEDURE — 45385 COLONOSCOPY W/LESION REMOVAL: CPT | Mod: PT | Performed by: INTERNAL MEDICINE

## 2020-02-27 ENCOUNTER — OFFICE VISIT (OUTPATIENT)
Dept: ORTHOPEDIC SURGERY | Facility: CLINIC | Age: 80
End: 2020-02-27

## 2020-02-27 VITALS — WEIGHT: 224 LBS | BODY MASS INDEX: 30.34 KG/M2 | HEIGHT: 72 IN | TEMPERATURE: 98.4 F

## 2020-02-27 DIAGNOSIS — M25.561 PAIN IN BOTH KNEES, UNSPECIFIED CHRONICITY: ICD-10-CM

## 2020-02-27 DIAGNOSIS — M25.562 PAIN IN BOTH KNEES, UNSPECIFIED CHRONICITY: ICD-10-CM

## 2020-02-27 DIAGNOSIS — M25.561 CHRONIC PAIN OF RIGHT KNEE: ICD-10-CM

## 2020-02-27 DIAGNOSIS — Z96.652 S/P LEFT UNICOMPARTMENTAL KNEE REPLACEMENT: Primary | ICD-10-CM

## 2020-02-27 DIAGNOSIS — G89.29 CHRONIC PAIN OF RIGHT KNEE: ICD-10-CM

## 2020-02-27 PROCEDURE — 73562 X-RAY EXAM OF KNEE 3: CPT | Performed by: ORTHOPAEDIC SURGERY

## 2020-02-27 PROCEDURE — 20610 DRAIN/INJ JOINT/BURSA W/O US: CPT | Performed by: ORTHOPAEDIC SURGERY

## 2020-02-27 PROCEDURE — 99213 OFFICE O/P EST LOW 20 MIN: CPT | Performed by: ORTHOPAEDIC SURGERY

## 2020-02-27 RX ORDER — METHYLPREDNISOLONE ACETATE 80 MG/ML
80 INJECTION, SUSPENSION INTRA-ARTICULAR; INTRALESIONAL; INTRAMUSCULAR; SOFT TISSUE
Status: COMPLETED | OUTPATIENT
Start: 2020-02-27 | End: 2020-02-27

## 2020-02-27 RX ADMIN — METHYLPREDNISOLONE ACETATE 80 MG: 80 INJECTION, SUSPENSION INTRA-ARTICULAR; INTRALESIONAL; INTRAMUSCULAR; SOFT TISSUE at 16:09

## 2020-02-27 NOTE — PROGRESS NOTES
Patient: Asa Magaña  YOB: 1940 79 y.o. male  Medical Record Number: [id number]    Chief Complaints:   Chief Complaint   Patient presents with   • Left Knee - Follow-up, Pain       History of Present Illness:Asa Magaña is a 79 y.o. male who presents for follow-up of  Bilateral knee pain. He's is a year and a half out from left medial UKA and did well until 3 months ago when he began having ant/medial knee pain worse with stairs  -  6/10 pain aching. Denies f/c/ or constitutional sx    Allergies:   Allergies   Allergen Reactions   • Ciprofloxacin Hives and Palpitations   • Quinolones Hives and Palpitations       Medications:   Current Outpatient Medications   Medication Sig Dispense Refill   • acetaminophen (TYLENOL) 325 MG tablet Take 2 tablets by mouth Every 4 (Four) Hours As Needed for Mild Pain (1-3).  0   • finasteride (PROSCAR) 5 MG tablet Take 5 mg by mouth Daily.     • levothyroxine (SYNTHROID, LEVOTHROID) 75 MCG tablet Take 75 mcg by mouth Every Morning.     • rosuvastatin (CRESTOR) 5 MG tablet Take 5 mg by mouth Every Night.     • SUMAtriptan (IMITREX) 50 MG tablet Take one tablet at onset of headache. May repeat dose one time in 2 hours if headache not relieved. (Patient taking differently: Take 50 mg by mouth 1 (One) Time As Needed. Take one tablet at onset of headache. May repeat dose one time in 2 hours if headache not relieved.)  0   • ondansetron (ZOFRAN) 4 MG tablet Take 1 tablet by mouth Every 6 (Six) Hours As Needed for Nausea or Vomiting for up to 10 doses. 10 tablet 0     No current facility-administered medications for this visit.      Facility-Administered Medications Ordered in Other Visits   Medication Dose Route Frequency Provider Last Rate Last Dose   • mupirocin (BACTROBAN) 2 % nasal ointment   Nasal BID Davin Beasley MD             The following portions of the patient's history were reviewed and updated as appropriate: allergies, current medications, past family  "history, past medical history, past social history, past surgical history and problem list.    Review of Systems:   A 14 point review of systems was performed. All systems negative except pertinent positives/negative listed in HPI above    Physical Exam:   Vitals:    02/27/20 1543   Temp: 98.4 °F (36.9 °C)   TempSrc: Temporal   Weight: 102 kg (224 lb)   Height: 182.9 cm (72\")   PainSc:   6   PainLoc: Knee       General: A and O x 3, ASA, NAD    SCLERA:    Normal    DENTITION:   Normal  Knee:  left    ALIGNMENT:     Neutral  ,   Patella  tracks  Midline without crepitance    GAIT:    Slight antalgic    SKIN:    Well healed midline incision, no erythema or fluctuance    RANGE OF MOTION:   0  -  125   DEG    STRENGTH:   5  / 5    LIGAMENTS:    No varus / valgus instability.   No  Flexion   instability.       DISTAL PULSES:    Paplable    DISTAL SENSATION :   Intact    LYMPHATICS:     No   lymphadenopathy    OTHER:     No   Effusion      Calf soft / nontender ,   Negative Contreras's sign  Knee:  right    ALIGNMENT:     Varus  ,   Patella  tracks  midline    GAIT:    Antalgic    SKIN:    No abnormality    RANGE OF MOTION:   2  -  125   DEG    STRENGTH:   4  / 5    LIGAMENTS:    No varus / valgus instability.   Negative  Lachman.    MENISCUS:     Negative   Trip       DISTAL PULSES:    Paplable    DISTAL SENSATION :   Intact    LYMPHATICS:     No   lymphadenopathy    OTHER:          - Positive trace  effusion      - Crepitance with ROM               Radiology:  Xrays 3viewsleft knee  (ap,lateral, sunrise) were ordered and reviewed for evaluation of knee pain demonstrating a well positioned left uni knee replacement without evidence of wear, loosening or osteolysis  todays xrays were compared to previous xrays and demonstrate no change. There are no degenerative lateral or PF changes    Assessment/Plan:  Left UKA with recent onset pain -  No evidence of wear, loosening, or arthritic progression   Right knee OA    Injected " both knees, will follow, if not improved will consider MRI left knee    Large Joint Arthrocentesis: R knee  Date/Time: 2/27/2020 4:09 PM  Consent given by: patient  Site marked: site marked  Timeout: Immediately prior to procedure a time out was called to verify the correct patient, procedure, equipment, support staff and site/side marked as required   Supporting Documentation  Indications: pain and joint swelling   Procedure Details  Location: knee - R knee  Preparation: Patient was prepped and draped in the usual sterile fashion  Needle gauge: 21 G.  Approach: anterolateral  Medications administered: 80 mg methylPREDNISolone acetate 80 MG/ML; 2 mL lidocaine (cardiac)  Patient tolerance: patient tolerated the procedure well with no immediate complications    Large Joint Arthrocentesis: L knee  Date/Time: 2/27/2020 4:09 PM  Consent given by: patient  Site marked: site marked  Timeout: Immediately prior to procedure a time out was called to verify the correct patient, procedure, equipment, support staff and site/side marked as required   Supporting Documentation  Indications: pain and joint swelling   Procedure Details  Location: knee - L knee  Preparation: Patient was prepped and draped in the usual sterile fashion  Needle gauge: 21 G.  Approach: anterolateral  Medications administered: 2 mL lidocaine (cardiac); 80 mg methylPREDNISolone acetate 80 MG/ML  Patient tolerance: patient tolerated the procedure well with no immediate complications

## 2020-03-13 ENCOUNTER — APPOINTMENT (OUTPATIENT)
Dept: GENERAL RADIOLOGY | Facility: HOSPITAL | Age: 80
End: 2020-03-13

## 2020-03-13 ENCOUNTER — APPOINTMENT (OUTPATIENT)
Dept: CT IMAGING | Facility: HOSPITAL | Age: 80
End: 2020-03-13

## 2020-03-13 ENCOUNTER — HOSPITAL ENCOUNTER (OUTPATIENT)
Facility: HOSPITAL | Age: 80
Setting detail: OBSERVATION
Discharge: HOME | End: 2020-03-14
Attending: EMERGENCY MEDICINE | Admitting: HOSPITALIST

## 2020-03-13 ENCOUNTER — APPOINTMENT (OUTPATIENT)
Dept: MRI IMAGING | Facility: HOSPITAL | Age: 80
End: 2020-03-13

## 2020-03-13 DIAGNOSIS — R11.2 INTRACTABLE VOMITING WITH NAUSEA, UNSPECIFIED VOMITING TYPE: ICD-10-CM

## 2020-03-13 DIAGNOSIS — R42 VERTIGO: Primary | ICD-10-CM

## 2020-03-13 PROBLEM — R07.89 CHEST PAIN, ATYPICAL: Status: ACTIVE | Noted: 2020-03-13

## 2020-03-13 PROBLEM — J32.0 MAXILLARY SINUSITIS: Status: ACTIVE | Noted: 2020-03-13

## 2020-03-13 LAB
ABO GROUP BLD: NORMAL
ALBUMIN SERPL-MCNC: 4.4 G/DL (ref 3.5–5.2)
ALBUMIN/GLOB SERPL: 2.2 G/DL
ALP SERPL-CCNC: 69 U/L (ref 39–117)
ALT SERPL W P-5'-P-CCNC: 21 U/L (ref 1–41)
ANION GAP SERPL CALCULATED.3IONS-SCNC: 13 MMOL/L (ref 5–15)
APTT PPP: 32.5 SECONDS (ref 22.7–35.4)
AST SERPL-CCNC: 22 U/L (ref 1–40)
BASOPHILS # BLD AUTO: 0.04 10*3/MM3 (ref 0–0.2)
BASOPHILS NFR BLD AUTO: 0.4 % (ref 0–1.5)
BILIRUB SERPL-MCNC: 0.4 MG/DL (ref 0.2–1.2)
BLD GP AB SCN SERPL QL: NEGATIVE
BUN BLD-MCNC: 19 MG/DL (ref 8–23)
BUN/CREAT SERPL: 16 (ref 7–25)
CALCIUM SPEC-SCNC: 8.3 MG/DL (ref 8.6–10.5)
CHLORIDE SERPL-SCNC: 101 MMOL/L (ref 98–107)
CO2 SERPL-SCNC: 21 MMOL/L (ref 22–29)
CREAT BLD-MCNC: 1.19 MG/DL (ref 0.76–1.27)
CREAT BLDA-MCNC: 1.1 MG/DL (ref 0.6–1.3)
DEPRECATED RDW RBC AUTO: 42.9 FL (ref 37–54)
EOSINOPHIL # BLD AUTO: 0.08 10*3/MM3 (ref 0–0.4)
EOSINOPHIL NFR BLD AUTO: 0.8 % (ref 0.3–6.2)
ERYTHROCYTE [DISTWIDTH] IN BLOOD BY AUTOMATED COUNT: 12.7 % (ref 12.3–15.4)
GFR SERPL CREATININE-BSD FRML MDRD: 59 ML/MIN/1.73
GLOBULIN UR ELPH-MCNC: 2 GM/DL
GLUCOSE BLD-MCNC: 147 MG/DL (ref 65–99)
GLUCOSE BLDC GLUCOMTR-MCNC: 135 MG/DL (ref 70–130)
HCT VFR BLD AUTO: 46.3 % (ref 37.5–51)
HGB BLD-MCNC: 15.7 G/DL (ref 13–17.7)
HOLD SPECIMEN: NORMAL
HOLD SPECIMEN: NORMAL
IMM GRANULOCYTES # BLD AUTO: 0.08 10*3/MM3 (ref 0–0.05)
IMM GRANULOCYTES NFR BLD AUTO: 0.8 % (ref 0–0.5)
INR PPP: 1.03 (ref 0.9–1.1)
LYMPHOCYTES # BLD AUTO: 1.89 10*3/MM3 (ref 0.7–3.1)
LYMPHOCYTES NFR BLD AUTO: 17.8 % (ref 19.6–45.3)
MCH RBC QN AUTO: 31.3 PG (ref 26.6–33)
MCHC RBC AUTO-ENTMCNC: 33.9 G/DL (ref 31.5–35.7)
MCV RBC AUTO: 92.2 FL (ref 79–97)
MONOCYTES # BLD AUTO: 1.16 10*3/MM3 (ref 0.1–0.9)
MONOCYTES NFR BLD AUTO: 10.9 % (ref 5–12)
NEUTROPHILS # BLD AUTO: 7.39 10*3/MM3 (ref 1.7–7)
NEUTROPHILS NFR BLD AUTO: 69.3 % (ref 42.7–76)
NRBC BLD AUTO-RTO: 0 /100 WBC (ref 0–0.2)
PLATELET # BLD AUTO: 208 10*3/MM3 (ref 140–450)
PMV BLD AUTO: 9.8 FL (ref 6–12)
POTASSIUM BLD-SCNC: 3.7 MMOL/L (ref 3.5–5.2)
PROT SERPL-MCNC: 6.4 G/DL (ref 6–8.5)
PROTHROMBIN TIME: 13.2 SECONDS (ref 11.7–14.2)
RBC # BLD AUTO: 5.02 10*6/MM3 (ref 4.14–5.8)
RH BLD: POSITIVE
SODIUM BLD-SCNC: 135 MMOL/L (ref 136–145)
T&S EXPIRATION DATE: NORMAL
TROPONIN T SERPL-MCNC: <0.01 NG/ML (ref 0–0.03)
TROPONIN T SERPL-MCNC: <0.01 NG/ML (ref 0–0.03)
WBC NRBC COR # BLD: 10.64 10*3/MM3 (ref 3.4–10.8)
WHOLE BLOOD HOLD SPECIMEN: NORMAL
WHOLE BLOOD HOLD SPECIMEN: NORMAL

## 2020-03-13 PROCEDURE — 70553 MRI BRAIN STEM W/O & W/DYE: CPT

## 2020-03-13 PROCEDURE — 25010000002 ONDANSETRON PER 1 MG: Performed by: EMERGENCY MEDICINE

## 2020-03-13 PROCEDURE — G0378 HOSPITAL OBSERVATION PER HR: HCPCS

## 2020-03-13 PROCEDURE — 85610 PROTHROMBIN TIME: CPT | Performed by: EMERGENCY MEDICINE

## 2020-03-13 PROCEDURE — 99285 EMERGENCY DEPT VISIT HI MDM: CPT

## 2020-03-13 PROCEDURE — 99203 OFFICE O/P NEW LOW 30 MIN: CPT | Performed by: PSYCHIATRY & NEUROLOGY

## 2020-03-13 PROCEDURE — 70496 CT ANGIOGRAPHY HEAD: CPT

## 2020-03-13 PROCEDURE — 25010000002 LORAZEPAM PER 2 MG: Performed by: EMERGENCY MEDICINE

## 2020-03-13 PROCEDURE — 84484 ASSAY OF TROPONIN QUANT: CPT | Performed by: NURSE PRACTITIONER

## 2020-03-13 PROCEDURE — 82962 GLUCOSE BLOOD TEST: CPT

## 2020-03-13 PROCEDURE — 0 IOPAMIDOL PER 1 ML: Performed by: EMERGENCY MEDICINE

## 2020-03-13 PROCEDURE — 96374 THER/PROPH/DIAG INJ IV PUSH: CPT

## 2020-03-13 PROCEDURE — 70498 CT ANGIOGRAPHY NECK: CPT

## 2020-03-13 PROCEDURE — 25010000002 DEXAMETHASONE PER 1 MG: Performed by: EMERGENCY MEDICINE

## 2020-03-13 PROCEDURE — 71045 X-RAY EXAM CHEST 1 VIEW: CPT

## 2020-03-13 PROCEDURE — 96376 TX/PRO/DX INJ SAME DRUG ADON: CPT

## 2020-03-13 PROCEDURE — 96375 TX/PRO/DX INJ NEW DRUG ADDON: CPT

## 2020-03-13 PROCEDURE — 85025 COMPLETE CBC W/AUTO DIFF WBC: CPT | Performed by: EMERGENCY MEDICINE

## 2020-03-13 PROCEDURE — 25010000002 ONDANSETRON PER 1 MG: Performed by: NURSE PRACTITIONER

## 2020-03-13 PROCEDURE — 86850 RBC ANTIBODY SCREEN: CPT | Performed by: EMERGENCY MEDICINE

## 2020-03-13 PROCEDURE — 0 GADOBENATE DIMEGLUMINE 529 MG/ML SOLUTION: Performed by: EMERGENCY MEDICINE

## 2020-03-13 PROCEDURE — A9577 INJ MULTIHANCE: HCPCS | Performed by: EMERGENCY MEDICINE

## 2020-03-13 PROCEDURE — 84484 ASSAY OF TROPONIN QUANT: CPT | Performed by: EMERGENCY MEDICINE

## 2020-03-13 PROCEDURE — 80053 COMPREHEN METABOLIC PANEL: CPT | Performed by: EMERGENCY MEDICINE

## 2020-03-13 PROCEDURE — 82565 ASSAY OF CREATININE: CPT

## 2020-03-13 PROCEDURE — 93010 ELECTROCARDIOGRAM REPORT: CPT | Performed by: INTERNAL MEDICINE

## 2020-03-13 PROCEDURE — 86901 BLOOD TYPING SEROLOGIC RH(D): CPT | Performed by: EMERGENCY MEDICINE

## 2020-03-13 PROCEDURE — 86900 BLOOD TYPING SEROLOGIC ABO: CPT | Performed by: EMERGENCY MEDICINE

## 2020-03-13 PROCEDURE — 85730 THROMBOPLASTIN TIME PARTIAL: CPT | Performed by: EMERGENCY MEDICINE

## 2020-03-13 PROCEDURE — 0042T HC CT CEREBRAL PERFUSION W/WO CONTRAST: CPT

## 2020-03-13 PROCEDURE — 93005 ELECTROCARDIOGRAM TRACING: CPT | Performed by: EMERGENCY MEDICINE

## 2020-03-13 RX ORDER — FLUTICASONE PROPIONATE 50 MCG
2 SPRAY, SUSPENSION (ML) NASAL DAILY
Status: DISCONTINUED | OUTPATIENT
Start: 2020-03-13 | End: 2020-03-14 | Stop reason: HOSPADM

## 2020-03-13 RX ORDER — MECLIZINE HYDROCHLORIDE 25 MG/1
25 TABLET ORAL 3 TIMES DAILY PRN
Status: DISCONTINUED | OUTPATIENT
Start: 2020-03-13 | End: 2020-03-14 | Stop reason: HOSPADM

## 2020-03-13 RX ORDER — SCOLOPAMINE TRANSDERMAL SYSTEM 1 MG/1
1 PATCH, EXTENDED RELEASE TRANSDERMAL
Status: DISCONTINUED | OUTPATIENT
Start: 2020-03-13 | End: 2020-03-14 | Stop reason: HOSPADM

## 2020-03-13 RX ORDER — SODIUM CHLORIDE 0.9 % (FLUSH) 0.9 %
10 SYRINGE (ML) INJECTION EVERY 12 HOURS SCHEDULED
Status: DISCONTINUED | OUTPATIENT
Start: 2020-03-13 | End: 2020-03-13

## 2020-03-13 RX ORDER — ONDANSETRON 2 MG/ML
4 INJECTION INTRAMUSCULAR; INTRAVENOUS EVERY 6 HOURS PRN
Status: DISCONTINUED | OUTPATIENT
Start: 2020-03-13 | End: 2020-03-14 | Stop reason: HOSPADM

## 2020-03-13 RX ORDER — SODIUM CHLORIDE 0.9 % (FLUSH) 0.9 %
10 SYRINGE (ML) INJECTION AS NEEDED
Status: DISCONTINUED | OUTPATIENT
Start: 2020-03-13 | End: 2020-03-13

## 2020-03-13 RX ORDER — ONDANSETRON 2 MG/ML
4 INJECTION INTRAMUSCULAR; INTRAVENOUS ONCE
Status: COMPLETED | OUTPATIENT
Start: 2020-03-13 | End: 2020-03-13

## 2020-03-13 RX ORDER — ONDANSETRON 4 MG/1
4 TABLET, FILM COATED ORAL EVERY 6 HOURS PRN
Status: DISCONTINUED | OUTPATIENT
Start: 2020-03-13 | End: 2020-03-14 | Stop reason: HOSPADM

## 2020-03-13 RX ORDER — ACETAMINOPHEN 325 MG/1
650 TABLET ORAL EVERY 4 HOURS PRN
Status: DISCONTINUED | OUTPATIENT
Start: 2020-03-13 | End: 2020-03-14 | Stop reason: HOSPADM

## 2020-03-13 RX ORDER — DOCUSATE SODIUM 100 MG/1
100 CAPSULE, LIQUID FILLED ORAL 2 TIMES DAILY PRN
Status: DISCONTINUED | OUTPATIENT
Start: 2020-03-13 | End: 2020-03-14 | Stop reason: HOSPADM

## 2020-03-13 RX ORDER — LORAZEPAM 2 MG/ML
0.5 INJECTION INTRAMUSCULAR ONCE
Status: COMPLETED | OUTPATIENT
Start: 2020-03-13 | End: 2020-03-13

## 2020-03-13 RX ORDER — ASPIRIN 81 MG/1
324 TABLET, CHEWABLE ORAL ONCE
Status: COMPLETED | OUTPATIENT
Start: 2020-03-13 | End: 2020-03-13

## 2020-03-13 RX ORDER — GUAIFENESIN 600 MG/1
600 TABLET, EXTENDED RELEASE ORAL EVERY 12 HOURS SCHEDULED
Status: DISCONTINUED | OUTPATIENT
Start: 2020-03-13 | End: 2020-03-14 | Stop reason: HOSPADM

## 2020-03-13 RX ORDER — SODIUM CHLORIDE 0.9 % (FLUSH) 0.9 %
10 SYRINGE (ML) INJECTION AS NEEDED
Status: DISCONTINUED | OUTPATIENT
Start: 2020-03-13 | End: 2020-03-14 | Stop reason: HOSPADM

## 2020-03-13 RX ADMIN — SODIUM CHLORIDE, PRESERVATIVE FREE 10 ML: 5 INJECTION INTRAVENOUS at 18:02

## 2020-03-13 RX ADMIN — ONDANSETRON 4 MG: 2 INJECTION INTRAMUSCULAR; INTRAVENOUS at 18:01

## 2020-03-13 RX ADMIN — GADOBENATE DIMEGLUMINE 20 ML: 529 INJECTION, SOLUTION INTRAVENOUS at 14:47

## 2020-03-13 RX ADMIN — GUAIFENESIN 600 MG: 600 TABLET, EXTENDED RELEASE ORAL at 18:01

## 2020-03-13 RX ADMIN — LORAZEPAM 0.5 MG: 2 INJECTION INTRAMUSCULAR; INTRAVENOUS at 13:09

## 2020-03-13 RX ADMIN — ACETAMINOPHEN 650 MG: 325 TABLET, FILM COATED ORAL at 18:01

## 2020-03-13 RX ADMIN — IOPAMIDOL 150 ML: 755 INJECTION, SOLUTION INTRAVENOUS at 12:48

## 2020-03-13 RX ADMIN — SCOPALAMINE 1 PATCH: 1 PATCH, EXTENDED RELEASE TRANSDERMAL at 14:04

## 2020-03-13 RX ADMIN — MECLIZINE HYDROCHLORIDE 25 MG: 25 TABLET ORAL at 18:01

## 2020-03-13 RX ADMIN — DEXAMETHASONE SODIUM PHOSPHATE 10 MG: 10 INJECTION INTRAMUSCULAR; INTRAVENOUS at 13:45

## 2020-03-13 RX ADMIN — ASPIRIN 324 MG: 81 TABLET, CHEWABLE ORAL at 13:44

## 2020-03-13 RX ADMIN — ONDANSETRON 4 MG: 2 INJECTION INTRAMUSCULAR; INTRAVENOUS at 12:40

## 2020-03-13 RX ADMIN — FLUTICASONE PROPIONATE 2 SPRAY: 50 SPRAY, METERED NASAL at 18:01

## 2020-03-13 NOTE — PLAN OF CARE
Problem: Patient Care Overview  Goal: Plan of Care Review  Outcome: Ongoing (interventions implemented as appropriate)  Flowsheets (Taken 3/13/2020 1815)  Progress: no change  Plan of Care Reviewed With: patient; spouse  Outcome Summary: New admit with severe vertigo, vss, pt c/o of nausea and dizziness, prn zofran and anitvert given, all diagnostic testing completed and negative for cva, will continue to monitor

## 2020-03-13 NOTE — H&P
"    Patient Name:  Asa Magaña  YOB: 1940  MRN:  [id number]  Admit Date:  3/13/2020  Patient Care Team:  Michele Rivero MD as PCP - General (Family Medicine)      Subjective   History Present Illness     Chief Complaint   Patient presents with   • Dizziness       Mr. Magaña is a 79 y.o. never smoker with a history of hypothyroidism, history of migraine headaches, BPH, HLD who admitted for vertigo.    Wife at bedside.  Patient reportedly helped her with groceries this morning, ate a bagel for breakfast, and went to [facility] for haircut where he developed acute dizziness approximately 1100 on 3/13/2020.  Patient reportedly called wife and said \"cannot walk\" secondary to acute dizziness; therefore, wife on her way to [facility] noted patient in route to ER via EMS.    Patient denies LOC or headache surrounding the event; however, reports left frontal headache now with history of migraines -- states this is unlike migraine -- patient does not look debilitated or in any acute distress also no evidence of photophobia.  Additionally, patient reports nausea with vomiting surrounding today's event (\"room spinning\") with new complaints of \"dull\" left chest pain.      In ER, troponin unremarkable <0.010 --will continue to trend, EKG preliminary result sinus rhythm, PVC, low voltage precordial leads, CTA head neck unremarkable for acute intracranial hemorrhage with noted left maxillary sinus circumferential mucosal thickening in the central, chronic inspissated secretions.  On physical exam, no evidence of focality (motor or sensory) noted yet presence of horizontal nystagmus.     Further recommendations per hospital course.  See additional details below in assessment / plan.    History of Present Illness  Review of Systems   Constitutional: Negative for chills and fever.   HENT: Positive for rhinorrhea. Negative for congestion.    Eyes: Positive for visual disturbance (reported \"double vision\" -- " "resolved). Negative for photophobia.   Respiratory: Positive for shortness of breath. Negative for cough.    Cardiovascular: Positive for chest pain (left \"dull\"). Negative for leg swelling.   Gastrointestinal: Positive for nausea and vomiting. Negative for abdominal distention, abdominal pain, constipation and diarrhea.   Endocrine: Negative for polydipsia, polyphagia and polyuria.   Genitourinary: Negative for difficulty urinating and dysuria.   Musculoskeletal: Positive for arthralgias (chronic left knee). Negative for back pain, neck pain and neck stiffness.   Skin: Negative for rash and wound.   Neurological: Positive for dizziness and headaches (left frontal ). Negative for tremors, seizures, syncope, facial asymmetry, speech difficulty, weakness, light-headedness and numbness.   Psychiatric/Behavioral: Negative for confusion and hallucinations.        Personal History     Past Medical History:   Diagnosis Date   • Asthma    • BPH (benign prostatic hyperplasia)    • History of kidney stones    • History of sepsis 02/2017    BLADDER INFECTION AND PNEUMONIA BOTH TWICE   • Hx of migraine headaches    • Hyperlipidemia    • Hypothyroidism    • Knee pain    • Skin abrasion     LEFT FOREARM THAT HAS NOW HEALED     Past Surgical History:   Procedure Laterality Date   • COLONOSCOPY  2016    benign polyp-repeat 5 years- Filemon Beasley M.D.   • CYSTOSCOPY TRANSURETHRAL RESECTION OF PROSTATE     • CYSTOSCOPY W/ URETERAL STENT PLACEMENT Right 1/20/2017    Procedure: CYSTOSCOPY, ureteroscopy,  URETERAL STENT INSERTION, RT;  Surgeon: Shashi Arciniega MD;  Location: [facility];  Service:    • HERNIA REPAIR     • KNEE ARTHROPLASTY UNICOMPARTMENTAL Left 9/28/2018    Procedure: KNEE ARTHROPLASTY UNICOMPARTMENTAL;  Surgeon: Davin Beasley MD;  Location: [facility];  Service: Orthopedics   • KNEE SURGERY      KNEE ARTHROSCOPY X3   • THYROIDECTOMY       Family History   Problem Relation Age of Onset   • Diabetes Mother  "   • Heart disease Mother    • Diabetes Father    • Heart disease Father    • Malig Hyperthermia Neg Hx      Social History     Tobacco Use   • Smoking status: Never Smoker   • Smokeless tobacco: Never Used   Substance Use Topics   • Alcohol use: Yes     Comment: seldom   • Drug use: No     Current Facility-Administered Medications on File Prior to Encounter   Medication Dose Route Frequency Provider Last Rate Last Dose   • mupirocin (BACTROBAN) 2 % nasal ointment   Nasal BID Davin Beasley MD         Current Outpatient Medications on File Prior to Encounter   Medication Sig Dispense Refill   • SUMAtriptan (IMITREX) 50 MG tablet Take one tablet at onset of headache. May repeat dose one time in 2 hours if headache not relieved. (Patient taking differently: Take 50 mg by mouth 1 (One) Time As Needed. Take one tablet at onset of headache. May repeat dose one time in 2 hours if headache not relieved.)  0   • acetaminophen (TYLENOL) 325 MG tablet Take 2 tablets by mouth Every 4 (Four) Hours As Needed for Mild Pain (1-3).  0   • finasteride (PROSCAR) 5 MG tablet Take 5 mg by mouth Daily.     • levothyroxine (SYNTHROID, LEVOTHROID) 75 MCG tablet Take 75 mcg by mouth Every Morning.     • ondansetron (ZOFRAN) 4 MG tablet Take 1 tablet by mouth Every 6 (Six) Hours As Needed for Nausea or Vomiting for up to 10 doses. 10 tablet 0   • rosuvastatin (CRESTOR) 5 MG tablet Take 5 mg by mouth Every Night.       Allergies   Allergen Reactions   • Ciprofloxacin Hives and Palpitations   • Quinolones Hives and Palpitations       Objective    Objective     Vital Signs  Temp:  [97 °F (36.1 °C)-97.4 °F (36.3 °C)] 97.4 °F (36.3 °C)  Heart Rate:  [57-66] 59  Resp:  [16-18] 16  BP: (145-189)/(81-92) 189/92  SpO2:  [94 %-98 %] 96 %  on   ;   Device (Oxygen Therapy): room air  Body mass index is 32.84 kg/m².    Physical Exam   Constitutional: He is oriented to person, place, and time. No distress.   HENT:   Head: Normocephalic and atraumatic.    Eyes: Pupils are equal, round, and reactive to light. EOM are normal.   Neck: Normal range of motion. Neck supple.   Cardiovascular: Normal rate and normal heart sounds.   Pulmonary/Chest: Effort normal and breath sounds normal.   Abdominal: Soft. Bowel sounds are normal.   Musculoskeletal: He exhibits no edema.   Neurological: He is alert and oriented to person, place, and time. A cranial nerve deficit (horizontal nystagmus ) is present. No sensory deficit. Coordination normal.   Skin: Skin is warm and dry. He is not diaphoretic.   Psychiatric: He has a normal mood and affect. His behavior is normal. Judgment and thought content normal.       Results Review:  I reviewed the patient's new clinical results.  I reviewed the patient's new imaging results and agree with the interpretation.  I reviewed the patient's other test results and agree with the interpretation  I personally viewed and interpreted the patient's EKG/Telemetry data  Discussed with ED provider.    Lab Results (last 24 hours)     Procedure Component Value Units Date/Time    POC Glucose Once [[id number]]  (Abnormal) Collected:  03/13/20 1229    Specimen:  Blood Updated:  03/13/20 1240     Glucose 135 mg/dL     CBC & Differential [[id number]] Collected:  03/13/20 1230    Specimen:  Blood Updated:  03/13/20 1237    Narrative:       The following orders were created for panel order CBC & Differential.  Procedure                               Abnormality         Status                     ---------                               -----------         ------                     CBC Auto Differential[[id number]]        Abnormal            Final result                 Please view results for these tests on the individual orders.    Comprehensive Metabolic Panel [[id number]]  (Abnormal) Collected:  03/13/20 1230    Specimen:  Blood Updated:  03/13/20 1258     Glucose 147 mg/dL      BUN 19 mg/dL      Creatinine 1.19 mg/dL      Sodium 135 mmol/L      Potassium 3.7  mmol/L      Chloride 101 mmol/L      CO2 21.0 mmol/L      Calcium 8.3 mg/dL      Total Protein 6.4 g/dL      Albumin 4.40 g/dL      ALT (SGPT) 21 U/L      AST (SGOT) 22 U/L      Alkaline Phosphatase 69 U/L      Total Bilirubin 0.4 mg/dL      eGFR Non African Amer 59 mL/min/1.73      Globulin 2.0 gm/dL      A/G Ratio 2.2 g/dL      BUN/Creatinine Ratio 16.0     Anion Gap 13.0 mmol/L     Narrative:       GFR Normal >60  Chronic Kidney Disease <60  Kidney Failure <15      Protime-INR [[id number]]  (Normal) Collected:  03/13/20 1230    Specimen:  Blood Updated:  03/13/20 1247     Protime 13.2 Seconds      INR 1.03    aPTT [[id number]]  (Normal) Collected:  03/13/20 1230    Specimen:  Blood Updated:  03/13/20 1247     PTT 32.5 seconds     Troponin [[id number]]  (Normal) Collected:  03/13/20 1230    Specimen:  Blood Updated:  03/13/20 1258     Troponin T <0.010 ng/mL     Narrative:       Troponin T Reference Range:  <= 0.03 ng/mL-   Negative for AMI  >0.03 ng/mL-     Abnormal for myocardial necrosis.  Clinicians would have to utilize clinical acumen, EKG, Troponin and serial changes to determine if it is an Acute Myocardial Infarction or myocardial injury due to an underlying chronic condition.       Results may be falsely decreased if patient taking Biotin.      CBC Auto Differential [[id number]]  (Abnormal) Collected:  03/13/20 1230    Specimen:  Blood Updated:  03/13/20 1237     WBC 10.64 10*3/mm3      RBC 5.02 10*6/mm3      Hemoglobin 15.7 g/dL      Hematocrit 46.3 %      MCV 92.2 fL      MCH 31.3 pg      MCHC 33.9 g/dL      RDW 12.7 %      RDW-SD 42.9 fl      MPV 9.8 fL      Platelets 208 10*3/mm3      Neutrophil % 69.3 %      Lymphocyte % 17.8 %      Monocyte % 10.9 %      Eosinophil % 0.8 %      Basophil % 0.4 %      Immature Grans % 0.8 %      Neutrophils, Absolute 7.39 10*3/mm3      Lymphocytes, Absolute 1.89 10*3/mm3      Monocytes, Absolute 1.16 10*3/mm3      Eosinophils, Absolute 0.08 10*3/mm3      Basophils,  Absolute 0.04 10*3/mm3      Immature Grans, Absolute 0.08 10*3/mm3      nRBC 0.0 /100 WBC     POC Creatinine [[id number]]  (Normal) Collected:  03/13/20 1242    Specimen:  Blood Updated:  03/13/20 1341     Creatinine 1.10 mg/dL      Comment: Serial Number: [id number]:  [id number]             Imaging Results (Last 24 Hours)     Procedure Component Value Units Date/Time    MRI Brain With & Without Contrast [[id number]] Resulted:  03/13/20 1449     Updated:  03/13/20 1509    CT Angiogram Head [[id number]] Collected:  03/13/20 1400     Updated:  03/13/20 1400    Narrative:       EMERGENCY CONTRAST-ENHANCED CT ANGIOGRAM OF THE HEAD AND NECK WITH CT  PERFUSION STUDY OF THE HEAD 03/13/2020     CLINICAL HISTORY: Acute onset dizziness, vomiting, possible stroke.     NONCONTRAST HEAD CT      TECHNIQUE: Spiral CT images were obtained from the base of the skull to  the vertex without intravenous contrast and images were reformatted and  submitted in 3 mm thick axial CT section with brain algorithm. 2 mm  thick sagittal and coronal reconstructions were performed and submitted  in brain algorithm.     This is correlated to prior noncontrast head CT from [facility] 01/09/2013.     FINDINGS: The brain parenchyma is normal in attenuation. The ventricles  are normal in size. I see no mass effect and no midline shift and no  extra-axial fluid collections are identified. There is no evidence of  acute intracranial hemorrhage. There is complete opacification of the  left maxillary sinus with circumferential mucosal thickening in the  central, chronic inspissated secretions The remainder of the paranasal  sinuses and mastoid air cells and middle ear cavities are clear. The  calvarium and skull base and orbits are unremarkable.       Impression:       Complete opacification of the left maxillary sinus with  circumferential mucosal thickening and hyperdense central chronic  inspissated secretions. The remainder of the head CT  is normal.      The results were communicated to Dr. Smith, the Stroke Neurologist  while the patient was still under the scanner 03/13/2020 at 12:44 PM and  he requested a CT angiogram of the head and neck and CT perfusion study  of the head for further evaluation.      CT ANGIOGRAM OF THE HEAD AND NECK AND CT PERFUSION STUDY      TECHNIQUE: Spiral CT images were obtained through the head during the  arterial phase of contrast and images were reformatted and analyzed with  rapid software analysis CT perfusion study of the head and subsequently  spiral CT images were obtained from the top of the aortic arch up  through the great vessels of the head and neck during arterial phase of  contrast. Images were reformatted and submitted in 1 mm thick axial,  sagittal and coronal CT sections. Additional 3D reconstructions were  performed to complete the CT angiogram of the head and neck and finally  spiral CT images were obtained from the base of skull to vertex delayed  following intravenous contrast and images were reformatted and submitted  in 3 mm thick axial CT section with brain algorithm.     FINDINGS:      CT PERFUSION: CT perfusion study consists of a 10 cm slab of brain  imaging from the mid cerebellar hemispheres through the cerebral  hemispheres and demonstrates no areas of delayed time to maximal  enhancement of greater than 6 seconds. Thus, no convincing hypoperfused  areas and there are no areas of reduced cerebral blood flow to less than  30% with no areas of acute completed infarction in the 10 cm field of  brain imaging.     CT ANGIOGRAM NECK: The nasopharynx, oropharynx, hypopharynx, true cords  and subglottic airway are normal in appearance. 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 The right lobe of the thyroid is unremarkable.  The lung apices are clear. The parotid glands are atrophic and there is  mild atrophy of  this submandibular glands. The  and  parapharyngeal spaces are normal in appearance. No pathologic  lymphadenopathy is seen in the neck. There is some minimal cervical  spondylosis, minimal disc space narrowing and degenerative endplate  changes at C4-5 and C5-6. There is uncovertebral joint spurring with  mild left foraminal narrowing at C5-6 and C6-7. There is anatomic origin  of great vessels off the aortic arch. The left subclavian artery origin  is normal in appearance and no stenosis is seen in the left subclavian  artery. There is focal mild-to-moderate stenosis at the left vertebral  artery origin. Beyond its origin the left vertebral artery is widely  patent without stenosis to the vertebrobasilar junction. The left common  carotid origin is normal in appearance and no stenosis is seen in the  left common carotid artery. Portions of the mid and left common carotid  artery are obscured by motion artifact. The distal left common carotid  artery and left common carotid bifurcation are normal in appearance. No  stenosis is seen in the left internal carotid artery using the NASCET  criteria. Brachycephalic artery origin is normal in appearance and no  stenosis is seen in the brachycephalic artery. Its bifurcation into the  right subclavian and common carotid artery is normal in appearance. No  stenosis is seen in the right subclavian artery. The right vertebral  artery origin is normal in appearance and no stenosis is seen in the  right vertebral artery from its origin to the vertebrobasilar junction.  The right common carotid origin is normal in appearance. Portions of the  mid right common carotid artery are obscured by motion artifact. The  distal right common carotid artery and right common carotid bifurcation  is normal in appearance and no stenosis is seen in the right internal  carotid artery using the NASCET criteria.     CT ANGIOGRAM OF THE HEAD: CT angiographic images of the head  demonstrate  a normal appearance to the intracranial segments of the distal vertebral  arteries as well as the basilar artery and basilar tip as well as the  posterior cerebral and superior cerebellar arteries bilaterally. The  upper cervical, petrous, cavernous and supracavernous segments of the  internal carotid arteries are normal in appearance. The anterior and  middle cerebral arteries are normal in appearance. The anterior  communicating artery origin and middle cerebral artery trifurcations are  normal in appearance.     IMPRESSION:     1. CT images of the head demonstrate complete opacification of the left  maxillary sinus. There is circumferential mucosal thickening and  hyperdense central chronic inspissated secretions. Otherwise the CT of  the head is normal. The results of the noncontrast head CT were  communicated to Dr. Smith from Stroke Neurology 03/13/2020 at 12:43  PM.     2. CT perfusion study demonstrates no areas of hypoperfusion and no  areas of acute completed infarction in the 10 cm slab of brain imaging  from the mid cerebellar hemispheres through the cerebral hemispheres.     3. There is a focal mild to up to mild-to-moderate stenosis of the left  vertebral artery origin; otherwise, the remainder of the CT angiogram of  the head and neck is within normal limits with no stenosis seen in the  great vessels of the head or neck.      The final dictated results were communicated to Dr. Smith from Stroke  Neurology and Dr. Galeas by telephone on 03/13/2020 at 1:00 PM.        Radiation dose reduction techniques were utilized, including automated  exposure control and exposure modulation based on body size.          CT Cerebral Perfusion With & Without Contrast [[id number]] Collected:  03/13/20 1400     Updated:  03/13/20 1400    Narrative:       EMERGENCY CONTRAST-ENHANCED CT ANGIOGRAM OF THE HEAD AND NECK WITH CT  PERFUSION STUDY OF THE HEAD 03/13/2020     CLINICAL HISTORY: Acute onset  dizziness, vomiting, possible stroke.     NONCONTRAST HEAD CT      TECHNIQUE: Spiral CT images were obtained from the base of the skull to  the vertex without intravenous contrast and images were reformatted and  submitted in 3 mm thick axial CT section with brain algorithm. 2 mm  thick sagittal and coronal reconstructions were performed and submitted  in brain algorithm.     This is correlated to prior noncontrast head CT from [facility] 01/09/2013.     FINDINGS: The brain parenchyma is normal in attenuation. The ventricles  are normal in size. I see no mass effect and no midline shift and no  extra-axial fluid collections are identified. There is no evidence of  acute intracranial hemorrhage. There is complete opacification of the  left maxillary sinus with circumferential mucosal thickening in the  central, chronic inspissated secretions The remainder of the paranasal  sinuses and mastoid air cells and middle ear cavities are clear. The  calvarium and skull base and orbits are unremarkable.       Impression:       Complete opacification of the left maxillary sinus with  circumferential mucosal thickening and hyperdense central chronic  inspissated secretions. The remainder of the head CT is normal.      The results were communicated to Dr. Smith, the Stroke Neurologist  while the patient was still under the scanner 03/13/2020 at 12:44 PM and  he requested a CT angiogram of the head and neck and CT perfusion study  of the head for further evaluation.      CT ANGIOGRAM OF THE HEAD AND NECK AND CT PERFUSION STUDY      TECHNIQUE: Spiral CT images were obtained through the head during the  arterial phase of contrast and images were reformatted and analyzed with  rapid software analysis CT perfusion study of the head and subsequently  spiral CT images were obtained from the top of the aortic arch up  through the great vessels of the head and neck during arterial phase of  contrast. Images were  reformatted and submitted in 1 mm thick axial,  sagittal and coronal CT sections. Additional 3D reconstructions were  performed to complete the CT angiogram of the head and neck and finally  spiral CT images were obtained from the base of skull to vertex delayed  following intravenous contrast and images were reformatted and submitted  in 3 mm thick axial CT section with brain algorithm.     FINDINGS:      CT PERFUSION: CT perfusion study consists of a 10 cm slab of brain  imaging from the mid cerebellar hemispheres through the cerebral  hemispheres and demonstrates no areas of delayed time to maximal  enhancement of greater than 6 seconds. Thus, no convincing hypoperfused  areas and there are no areas of reduced cerebral blood flow to less than  30% with no areas of acute completed infarction in the 10 cm field of  brain imaging.     CT ANGIOGRAM NECK: The nasopharynx, oropharynx, hypopharynx, true cords  and subglottic airway are normal in appearance. 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 The right lobe of the thyroid is unremarkable.  The lung apices are clear. The parotid glands are atrophic and there is  mild atrophy of this submandibular glands. The  and  parapharyngeal spaces are normal in appearance. No pathologic  lymphadenopathy is seen in the neck. There is some minimal cervical  spondylosis, minimal disc space narrowing and degenerative endplate  changes at C4-5 and C5-6. There is uncovertebral joint spurring with  mild left foraminal narrowing at C5-6 and C6-7. There is anatomic origin  of great vessels off the aortic arch. The left subclavian artery origin  is normal in appearance and no stenosis is seen in the left subclavian  artery. There is focal mild-to-moderate stenosis at the left vertebral  artery origin. Beyond its origin the left vertebral artery is widely  patent without stenosis to the  vertebrobasilar junction. The left common  carotid origin is normal in appearance and no stenosis is seen in the  left common carotid artery. Portions of the mid and left common carotid  artery are obscured by motion artifact. The distal left common carotid  artery and left common carotid bifurcation are normal in appearance. No  stenosis is seen in the left internal carotid artery using the NASCET  criteria. Brachycephalic artery origin is normal in appearance and no  stenosis is seen in the brachycephalic artery. Its bifurcation into the  right subclavian and common carotid artery is normal in appearance. No  stenosis is seen in the right subclavian artery. The right vertebral  artery origin is normal in appearance and no stenosis is seen in the  right vertebral artery from its origin to the vertebrobasilar junction.  The right common carotid origin is normal in appearance. Portions of the  mid right common carotid artery are obscured by motion artifact. The  distal right common carotid artery and right common carotid bifurcation  is normal in appearance and no stenosis is seen in the right internal  carotid artery using the NASCET criteria.     CT ANGIOGRAM OF THE HEAD: CT angiographic images of the head demonstrate  a normal appearance to the intracranial segments of the distal vertebral  arteries as well as the basilar artery and basilar tip as well as the  posterior cerebral and superior cerebellar arteries bilaterally. The  upper cervical, petrous, cavernous and supracavernous segments of the  internal carotid arteries are normal in appearance. The anterior and  middle cerebral arteries are normal in appearance. The anterior  communicating artery origin and middle cerebral artery trifurcations are  normal in appearance.     IMPRESSION:     1. CT images of the head demonstrate complete opacification of the left  maxillary sinus. There is circumferential mucosal thickening and  hyperdense central chronic  inspissated secretions. Otherwise the CT of  the head is normal. The results of the noncontrast head CT were  communicated to Dr. Smith from Stroke Neurology 03/13/2020 at 12:43  PM.     2. CT perfusion study demonstrates no areas of hypoperfusion and no  areas of acute completed infarction in the 10 cm slab of brain imaging  from the mid cerebellar hemispheres through the cerebral hemispheres.     3. There is a focal mild to up to mild-to-moderate stenosis of the left  vertebral artery origin; otherwise, the remainder of the CT angiogram of  the head and neck is within normal limits with no stenosis seen in the  great vessels of the head or neck.      The final dictated results were communicated to Dr. Smith from [facility] and Dr. Galeas by telephone on 03/13/2020 at 1:00 PM.        Radiation dose reduction techniques were utilized, including automated  exposure control and exposure modulation based on body size.          CT Angiogram Neck [[id number]] Collected:  03/13/20 1400     Updated:  03/13/20 1400    Narrative:       EMERGENCY CONTRAST-ENHANCED CT ANGIOGRAM OF THE HEAD AND NECK WITH CT  PERFUSION STUDY OF THE HEAD 03/13/2020     CLINICAL HISTORY: Acute onset dizziness, vomiting, possible stroke.     NONCONTRAST HEAD CT      TECHNIQUE: Spiral CT images were obtained from the base of the skull to  the vertex without intravenous contrast and images were reformatted and  submitted in 3 mm thick axial CT section with brain algorithm. 2 mm  thick sagittal and coronal reconstructions were performed and submitted  in brain algorithm.     This is correlated to prior noncontrast head CT from [facility] 01/09/2013.     FINDINGS: The brain parenchyma is normal in attenuation. The ventricles  are normal in size. I see no mass effect and no midline shift and no  extra-axial fluid collections are identified. There is no evidence of  acute intracranial hemorrhage. There is complete  opacification of the  left maxillary sinus with circumferential mucosal thickening in the  central, chronic inspissated secretions The remainder of the paranasal  sinuses and mastoid air cells and middle ear cavities are clear. The  calvarium and skull base and orbits are unremarkable.       Impression:       Complete opacification of the left maxillary sinus with  circumferential mucosal thickening and hyperdense central chronic  inspissated secretions. The remainder of the head CT is normal.      The results were communicated to Dr. Smith, the Stroke Neurologist  while the patient was still under the scanner 03/13/2020 at 12:44 PM and  he requested a CT angiogram of the head and neck and CT perfusion study  of the head for further evaluation.      CT ANGIOGRAM OF THE HEAD AND NECK AND CT PERFUSION STUDY      TECHNIQUE: Spiral CT images were obtained through the head during the  arterial phase of contrast and images were reformatted and analyzed with  rapid software analysis CT perfusion study of the head and subsequently  spiral CT images were obtained from the top of the aortic arch up  through the great vessels of the head and neck during arterial phase of  contrast. Images were reformatted and submitted in 1 mm thick axial,  sagittal and coronal CT sections. Additional 3D reconstructions were  performed to complete the CT angiogram of the head and neck and finally  spiral CT images were obtained from the base of skull to vertex delayed  following intravenous contrast and images were reformatted and submitted  in 3 mm thick axial CT section with brain algorithm.     FINDINGS:      CT PERFUSION: CT perfusion study consists of a 10 cm slab of brain  imaging from the mid cerebellar hemispheres through the cerebral  hemispheres and demonstrates no areas of delayed time to maximal  enhancement of greater than 6 seconds. Thus, no convincing hypoperfused  areas and there are no areas of reduced cerebral blood flow  to less than  30% with no areas of acute completed infarction in the 10 cm field of  brain imaging.     CT ANGIOGRAM NECK: The nasopharynx, oropharynx, hypopharynx, true cords  and subglottic airway are normal in appearance. 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 The right lobe of the thyroid is unremarkable.  The lung apices are clear. The parotid glands are atrophic and there is  mild atrophy of this submandibular glands. The  and  parapharyngeal spaces are normal in appearance. No pathologic  lymphadenopathy is seen in the neck. There is some minimal cervical  spondylosis, minimal disc space narrowing and degenerative endplate  changes at C4-5 and C5-6. There is uncovertebral joint spurring with  mild left foraminal narrowing at C5-6 and C6-7. There is anatomic origin  of great vessels off the aortic arch. The left subclavian artery origin  is normal in appearance and no stenosis is seen in the left subclavian  artery. There is focal mild-to-moderate stenosis at the left vertebral  artery origin. Beyond its origin the left vertebral artery is widely  patent without stenosis to the vertebrobasilar junction. The left common  carotid origin is normal in appearance and no stenosis is seen in the  left common carotid artery. Portions of the mid and left common carotid  artery are obscured by motion artifact. The distal left common carotid  artery and left common carotid bifurcation are normal in appearance. No  stenosis is seen in the left internal carotid artery using the NASCET  criteria. Brachycephalic artery origin is normal in appearance and no  stenosis is seen in the brachycephalic artery. Its bifurcation into the  right subclavian and common carotid artery is normal in appearance. No  stenosis is seen in the right subclavian artery. The right vertebral  artery origin is normal in appearance and no stenosis is seen in  the  right vertebral artery from its origin to the vertebrobasilar junction.  The right common carotid origin is normal in appearance. Portions of the  mid right common carotid artery are obscured by motion artifact. The  distal right common carotid artery and right common carotid bifurcation  is normal in appearance and no stenosis is seen in the right internal  carotid artery using the NASCET criteria.     CT ANGIOGRAM OF THE HEAD: CT angiographic images of the head demonstrate  a normal appearance to the intracranial segments of the distal vertebral  arteries as well as the basilar artery and basilar tip as well as the  posterior cerebral and superior cerebellar arteries bilaterally. The  upper cervical, petrous, cavernous and supracavernous segments of the  internal carotid arteries are normal in appearance. The anterior and  middle cerebral arteries are normal in appearance. The anterior  communicating artery origin and middle cerebral artery trifurcations are  normal in appearance.     IMPRESSION:     1. CT images of the head demonstrate complete opacification of the left  maxillary sinus. There is circumferential mucosal thickening and  hyperdense central chronic inspissated secretions. Otherwise the CT of  the head is normal. The results of the noncontrast head CT were  communicated to Dr. Smith from [facility]rology 03/13/2020 at 12:43  PM.     2. CT perfusion study demonstrates no areas of hypoperfusion and no  areas of acute completed infarction in the 10 cm slab of brain imaging  from the mid cerebellar hemispheres through the cerebral hemispheres.     3. There is a focal mild to up to mild-to-moderate stenosis of the left  vertebral artery origin; otherwise, the remainder of the CT angiogram of  the head and neck is within normal limits with no stenosis seen in the  great vessels of the head or neck.      The final dictated results were communicated to Dr. Smith from [facility] and   [place] by telephone on 03/13/2020 at 1:00 PM.        Radiation dose reduction techniques were utilized, including automated  exposure control and exposure modulation based on body size.          XR Chest 1 View [[id number]] Collected:  03/13/20 1335     Updated:  03/13/20 1339    Narrative:       XR CHEST 1 VW-     HISTORY: Male who is 79 years-old,  stroke     TECHNIQUE: Frontal view of the chest     COMPARISON: 03/08/2017     FINDINGS: The heart size is borderline. Aorta is tortuous. Pulmonary  vasculature is unremarkable. No focal pulmonary consolidation, pleural  effusion, or pneumothorax. No acute osseous process.       Impression:       No evidence for acute pulmonary process. Borderline heart  size. Tortuous aorta. Follow-up as clinically indicated.     This report was finalized on 3/13/2020 1:36 PM by Dr. Checo Flores M.D.                  ECG 12 Lead   Preliminary Result   HEART RATE= 60  bpm   RR Interval= 996  ms   NM Interval= 191  ms   P Horizontal Axis= -2  deg   P Front Axis= 78  deg   QRSD Interval= 94  ms   QT Interval= 427  ms   QRS Axis= 37  deg   T Wave Axis= 58  deg   - OTHERWISE NORMAL ECG -   Sinus rhythm   Ventricular premature complex   Low voltage, precordial leads   Electronically Signed By:    Date and Time of Study: 2020-03-13 13:27:13           Assessment/Plan     Active Hospital Problems    Diagnosis  POA   • **Vertigo [R42]  Yes   • Chronic maxillary sinusitis [J32.0]  Yes   • Chest pain, atypical [R07.89]  Yes   • Hx of migraine headaches [Z86.69]  Not Applicable   • Hypothyroidism [E03.9]  Yes      Resolved Hospital Problems   No resolved problems to display.       Mr. Magaña is a 79 y.o. never smoker with a history of hypothyroidism, history of migraine headaches, BPH, HLD who admitted for vertigo.      Vertigo   ? Chronic maxillary sinusitis   Consult neuro   CTA head, neck & cerebral perfusion complete opacification of the left maxillary sinus; otherwise normal, no areas of  hypoperfusions, or acute completed infarction, mild to moderate stenosis of the left vertebral artery    MRI brain pending results   Ordering meclizine TID       Hypothyroidism   Ordering TSH   Continue home levothyroxine      Hx of migraine headaches   No maintenance migraine meds at home       Chronic maxillary sinusitis   Ordering Flonase, mucinex       Chest pain, atypical   Initial troponin unremarkable, trending   EKG prelim SR, PVC, low voltage, precordial leads   Denies hx of CAD   CXR tortuous aorta, nothing acute    I discussed the patient's findings and my recommendations with Dr. Harley    *Home med rec pending RN verification prior to reconciliation*    VTE Prophylaxis - SCD  Code Status - CPR       YOANDY Baker  [facility]  03/13/20  15:46

## 2020-03-13 NOTE — ED PROVIDER NOTES
EMERGENCY DEPARTMENT ENCOUNTER    Room Number:  S513/1  Date seen:  3/13/2020  Time seen: 12:14 PM  PCP: Michele Rivero MD  Historian: Patient       HPI:  Chief Complaint: Dizziness   A complete HPI/ROS/PMH/PSH/SH/FH are unobtainable due to: Nothing   Context: Asa Magaña is a 79 y.o. male who presents to the ED via EMS c/o dizziness described as the room spinning that began this morning around 0800, improved and then returned around 1100. Pt reports he initially noticed mild dizziness around 0800 this morning which then resolved until he was getting his hair cut around 1100 and became extremely dizzy. Pt reports becoming very nauseated and began vomiting shortly after dizziness worsened. EMS reports giving 300 cc fluids and 4 mg Zofran, which initially improved nausea but pt has since began vomiting again. He reports dizziness is constant whether he is laying or standing and is unchanged with positional changes. Pt denies headache and diplopia. He affirms tinnitus in both ears and hearing loss for the past 6 months, unchanged today. Pt reports being too unsteady to ambulate since dizziness began again around 1100. He reports hx of HLD but denies hx of HTN, DM and cardiac hx. He denies being anticoagulated.       PAST MEDICAL HISTORY  Active Ambulatory Problems     Diagnosis Date Noted   • Asthma    • Hyperlipidemia    • Lumbago    • Hypothyroidism    • Hx of migraine headaches    • BPH (benign prostatic hyperplasia)    • Pyelonephritis 01/20/2017   • CAP (community acquired pneumonia) 03/08/2017   • Sepsis (CMS/[facility]) 03/08/2017   • Rectal bleeding 04/24/2018   • Primary osteoarthritis of left knee 08/14/2018     Resolved Ambulatory Problems     Diagnosis Date Noted   • No Resolved Ambulatory Problems     Past Medical History:   Diagnosis Date   • History of kidney stones    • History of sepsis 02/2017   • Knee pain    • Skin abrasion          PAST SURGICAL HISTORY  Past Surgical History:   Procedure  Laterality Date   • COLONOSCOPY  2016    benign polyp-repeat 5 years- Filemon Beasley M.D.   • CYSTOSCOPY TRANSURETHRAL RESECTION OF PROSTATE     • CYSTOSCOPY W/ URETERAL STENT PLACEMENT Right 1/20/2017    Procedure: CYSTOSCOPY, ureteroscopy,  URETERAL STENT INSERTION, RT;  Surgeon: Shashi Arciniega MD;  Location: [facility] OR;  Service:    • HERNIA REPAIR     • KNEE ARTHROPLASTY UNICOMPARTMENTAL Left 9/28/2018    Procedure: KNEE ARTHROPLASTY UNICOMPARTMENTAL;  Surgeon: Davin Beasley MD;  Location: [facility] OR;  Service: Orthopedics   • KNEE SURGERY      KNEE ARTHROSCOPY X3   • THYROIDECTOMY           FAMILY HISTORY  Family History   Problem Relation Age of Onset   • Diabetes Mother    • Heart disease Mother    • Diabetes Father    • Heart disease Father    • Malig Hyperthermia Neg Hx          SOCIAL HISTORY  Social History     Socioeconomic History   • Marital status:      Spouse name: Not on file   • Number of children: Not on file   • Years of education: Not on file   • Highest education level: Not on file   Tobacco Use   • Smoking status: Never Smoker   • Smokeless tobacco: Never Used   Substance and Sexual Activity   • Alcohol use: Yes     Comment: seldom   • Drug use: No   • Sexual activity: Defer         ALLERGIES  Ciprofloxacin and Quinolones        REVIEW OF SYSTEMS  Review of Systems   Constitutional: Negative for diaphoresis and fever.   HENT: Negative for congestion.    Eyes: Negative for visual disturbance.   Respiratory: Negative for shortness of breath.    Cardiovascular: Negative for palpitations.   Gastrointestinal: Positive for nausea and vomiting. Negative for blood in stool.   Endocrine: Negative for polyuria.   Genitourinary: Negative for flank pain.   Musculoskeletal: Negative for joint swelling.   Skin: Negative for wound.   Neurological: Positive for dizziness. Negative for seizures and headaches.   Hematological: Negative for adenopathy.   Psychiatric/Behavioral: Negative for  sleep disturbance.          PHYSICAL EXAM  ED Triage Vitals   Temp Heart Rate Resp BP SpO2   03/13/20 1205 03/13/20 1204 03/13/20 1204 03/13/20 1204 03/13/20 1204   97 °F (36.1 °C) 66 18 (!) 183/81 98 %      Temp src Heart Rate Source Patient Position BP Location FiO2 (%)   -- -- -- -- --                GENERAL:  awake and alert, appears to be in mild distress  HENT: nares patent  EYES: no scleral icterus, pupils are 3 mm and reactive bilaterally, nystagmus with rapid phase to the right, EOMI, reports diplopia when testing extra occular movements   CV: regular rhythm, normal rate  RESPIRATORY: normal effort, CTAB  ABDOMEN: soft, non-tender  MUSCULOSKELETAL: no deformity  NEURO: alert, moves all extremities, follows commands, reports slightly diminished sensation to light touch in L arm and L leg, horizontal nystagmus present, cranial nerves 2-12 grossly intact otherwise, normal strength in BUE and BLE with no pronator drift, intact finger to nose in BUE, speech if fluent   SKIN: warm, dry    Vital signs and nursing notes reviewed.        LAB RESULTS  Recent Results (from the past 24 hour(s))   POC Glucose Once    Collection Time: 03/13/20 12:29 PM   Result Value Ref Range    Glucose 135 (H) 70 - 130 mg/dL   Comprehensive Metabolic Panel    Collection Time: 03/13/20 12:30 PM   Result Value Ref Range    Glucose 147 (H) 65 - 99 mg/dL    BUN 19 8 - 23 mg/dL    Creatinine 1.19 0.76 - 1.27 mg/dL    Sodium 135 (L) 136 - 145 mmol/L    Potassium 3.7 3.5 - 5.2 mmol/L    Chloride 101 98 - 107 mmol/L    CO2 21.0 (L) 22.0 - 29.0 mmol/L    Calcium 8.3 (L) 8.6 - 10.5 mg/dL    Total Protein 6.4 6.0 - 8.5 g/dL    Albumin 4.40 3.50 - 5.20 g/dL    ALT (SGPT) 21 1 - 41 U/L    AST (SGOT) 22 1 - 40 U/L    Alkaline Phosphatase 69 39 - 117 U/L    Total Bilirubin 0.4 0.2 - 1.2 mg/dL    eGFR Non African Amer 59 (L) >60 mL/min/1.73    Globulin 2.0 gm/dL    A/G Ratio 2.2 g/dL    BUN/Creatinine Ratio 16.0 7.0 - 25.0    Anion Gap 13.0 5.0 - 15.0  mmol/L   Protime-INR    Collection Time: 03/13/20 12:30 PM   Result Value Ref Range    Protime 13.2 11.7 - 14.2 Seconds    INR 1.03 0.90 - 1.10   aPTT    Collection Time: 03/13/20 12:30 PM   Result Value Ref Range    PTT 32.5 22.7 - 35.4 seconds   Troponin    Collection Time: 03/13/20 12:30 PM   Result Value Ref Range    Troponin T <0.010 0.000 - 0.030 ng/mL   Type & Screen    Collection Time: 03/13/20 12:30 PM   Result Value Ref Range    ABO Type O     RH type Positive     Antibody Screen Negative     T&S Expiration Date 3/16/2020 11:59:59 PM    Light Blue Top    Collection Time: 03/13/20 12:30 PM   Result Value Ref Range    Extra Tube hold for add-on    Green Top (Gel)    Collection Time: 03/13/20 12:30 PM   Result Value Ref Range    Extra Tube Hold for add-ons.    Lavender Top    Collection Time: 03/13/20 12:30 PM   Result Value Ref Range    Extra Tube hold for add-on    Gold Top - SST    Collection Time: 03/13/20 12:30 PM   Result Value Ref Range    Extra Tube Hold for add-ons.    CBC Auto Differential    Collection Time: 03/13/20 12:30 PM   Result Value Ref Range    WBC 10.64 3.40 - 10.80 10*3/mm3    RBC 5.02 4.14 - 5.80 10*6/mm3    Hemoglobin 15.7 13.0 - 17.7 g/dL    Hematocrit 46.3 37.5 - 51.0 %    MCV 92.2 79.0 - 97.0 fL    MCH 31.3 26.6 - 33.0 pg    MCHC 33.9 31.5 - 35.7 g/dL    RDW 12.7 12.3 - 15.4 %    RDW-SD 42.9 37.0 - 54.0 fl    MPV 9.8 6.0 - 12.0 fL    Platelets 208 140 - 450 10*3/mm3    Neutrophil % 69.3 42.7 - 76.0 %    Lymphocyte % 17.8 (L) 19.6 - 45.3 %    Monocyte % 10.9 5.0 - 12.0 %    Eosinophil % 0.8 0.3 - 6.2 %    Basophil % 0.4 0.0 - 1.5 %    Immature Grans % 0.8 (H) 0.0 - 0.5 %    Neutrophils, Absolute 7.39 (H) 1.70 - 7.00 10*3/mm3    Lymphocytes, Absolute 1.89 0.70 - 3.10 10*3/mm3    Monocytes, Absolute 1.16 (H) 0.10 - 0.90 10*3/mm3    Eosinophils, Absolute 0.08 0.00 - 0.40 10*3/mm3    Basophils, Absolute 0.04 0.00 - 0.20 10*3/mm3    Immature Grans, Absolute 0.08 (H) 0.00 - 0.05 10*3/mm3     nRBC 0.0 0.0 - 0.2 /100 WBC   POC Creatinine    Collection Time: 03/13/20 12:42 PM   Result Value Ref Range    Creatinine 1.10 0.60 - 1.30 mg/dL   Troponin    Collection Time: 03/13/20  8:22 PM   Result Value Ref Range    Troponin T <0.010 0.000 - 0.030 ng/mL       Ordered the above labs and reviewed the results.        RADIOLOGY  Ct Angiogram Head    Result Date: 3/13/2020  EMERGENCY CONTRAST-ENHANCED CT ANGIOGRAM OF THE HEAD AND NECK WITH CT PERFUSION STUDY OF THE HEAD 03/13/2020  CLINICAL HISTORY: Acute onset dizziness, vomiting, possible stroke.  NONCONTRAST HEAD CT  TECHNIQUE: Spiral CT images were obtained from the base of the skull to the vertex without intravenous contrast and images were reformatted and submitted in 3 mm thick axial CT section with brain algorithm. 2 mm thick sagittal and coronal reconstructions were performed and submitted in brain algorithm.  This is correlated to prior noncontrast head CT from [facility] 01/09/2013.  FINDINGS: The brain parenchyma is normal in attenuation. The ventricles are normal in size. I see no mass effect and no midline shift and no extra-axial fluid collections are identified. There is no evidence of acute intracranial hemorrhage. There is complete opacification of the left maxillary sinus with circumferential mucosal thickening in the central, chronic inspissated secretions The remainder of the paranasal sinuses and mastoid air cells and middle ear cavities are clear. The calvarium and skull base and orbits are unremarkable.      Complete opacification of the left maxillary sinus with circumferential mucosal thickening and hyperdense central chronic inspissated secretions. The remainder of the head CT is normal.  The results were communicated to Dr. Smith, the Stroke Neurologist while the patient was still under the scanner 03/13/2020 at 12:44 PM and he requested a CT angiogram of the head and neck and CT perfusion study of the head for  further evaluation.  CT ANGIOGRAM OF THE HEAD AND NECK AND CT PERFUSION STUDY  TECHNIQUE: Spiral CT images were obtained through the head during the arterial phase of contrast and images were reformatted and analyzed with rapid software analysis CT perfusion study of the head and subsequently spiral CT images were obtained from the top of the aortic arch up through the great vessels of the head and neck during arterial phase of contrast. Images were reformatted and submitted in 1 mm thick axial, sagittal and coronal CT sections. Additional 3D reconstructions were performed to complete the CT angiogram of the head and neck and finally spiral CT images were obtained from the base of skull to vertex delayed following intravenous contrast and images were reformatted and submitted in 3 mm thick axial CT section with brain algorithm.  FINDINGS:  CT PERFUSION: CT perfusion study consists of a 10 cm slab of brain imaging from the mid cerebellar hemispheres through the cerebral hemispheres and demonstrates no areas of delayed time to maximal enhancement of greater than 6 seconds. Thus, no convincing hypoperfused areas and there are no areas of reduced cerebral blood flow to less than 30% with no areas of acute completed infarction in the 10 cm field of brain imaging.  CT ANGIOGRAM NECK: The nasopharynx, oropharynx, hypopharynx, true cords and subglottic airway are normal in appearance. 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 The right lobe of the thyroid is unremarkable. The lung apices are clear. The parotid glands are atrophic and there is mild atrophy of this submandibular glands. The  and parapharyngeal spaces are normal in appearance. No pathologic lymphadenopathy is seen in the neck. There is some minimal cervical spondylosis, minimal disc space narrowing and degenerative endplate changes at C4-5 and C5-6. There is uncovertebral  joint spurring with mild left foraminal narrowing at C5-6 and C6-7. There is anatomic origin of great vessels off the aortic arch. The left subclavian artery origin is normal in appearance and no stenosis is seen in the left subclavian artery. There is focal mild-to-moderate stenosis at the left vertebral artery origin. Beyond its origin the left vertebral artery is widely patent without stenosis to the vertebrobasilar junction. The left common carotid origin is normal in appearance and no stenosis is seen in the left common carotid artery. Portions of the mid and left common carotid artery are obscured by motion artifact. The distal left common carotid artery and left common carotid bifurcation are normal in appearance. No stenosis is seen in the left internal carotid artery using the NASCET criteria. Brachycephalic artery origin is normal in appearance and no stenosis is seen in the brachycephalic artery. Its bifurcation into the right subclavian and common carotid artery is normal in appearance. No stenosis is seen in the right subclavian artery. The right vertebral artery origin is normal in appearance and no stenosis is seen in the right vertebral artery from its origin to the vertebrobasilar junction. The right common carotid origin is normal in appearance. Portions of the mid right common carotid artery are obscured by motion artifact. The distal right common carotid artery and right common carotid bifurcation is normal in appearance and no stenosis is seen in the right internal carotid artery using the NASCET criteria.  CT ANGIOGRAM OF THE HEAD: CT angiographic images of the head demonstrate a normal appearance to the intracranial segments of the distal vertebral arteries as well as the basilar artery and basilar tip as well as the posterior cerebral and superior cerebellar arteries bilaterally. The upper cervical, petrous, cavernous and supracavernous segments of the internal carotid arteries are normal in  appearance. The anterior and middle cerebral arteries are normal in appearance. The anterior communicating artery origin and middle cerebral artery trifurcations are normal in appearance.  IMPRESSION:  1. CT images of the head demonstrate complete opacification of the left maxillary sinus with circumferential mucosal thickening and hyperdense central chronic inspissated secretions. Otherwise the CT of the head is normal. The results of the noncontrast head CT were communicated to Dr. Smith from Stroke Neurology 03/13/2020 at 12:43 PM.  2. CT perfusion study demonstrates no areas of hypoperfusion and no areas of acute completed infarction in the 10 cm slab of brain imaging from the mid cerebellar hemispheres through the cerebral hemispheres.  3. There is a focal mild to up to mild-to-moderate stenosis of the left vertebral artery origin; otherwise, the remainder of the CT angiogram of the head and neck is within normal limits with no stenosis seen in the great vessels of the head or neck.  The final dictated results were communicated to Dr. Smith from Stroke Neurology and Dr. Galeas by telephone on 03/13/2020 at 1:00 PM.   Radiation dose reduction techniques were utilized, including automated exposure control and exposure modulation based on body size.  This report was finalized on 3/13/2020 4:15 PM by Dr. Jamar Sears M.D.      Ct Angiogram Neck    Result Date: 3/13/2020  EMERGENCY CONTRAST-ENHANCED CT ANGIOGRAM OF THE HEAD AND NECK WITH CT PERFUSION STUDY OF THE HEAD 03/13/2020  CLINICAL HISTORY: Acute onset dizziness, vomiting, possible stroke.  NONCONTRAST HEAD CT  TECHNIQUE: Spiral CT images were obtained from the base of the skull to the vertex without intravenous contrast and images were reformatted and submitted in 3 mm thick axial CT section with brain algorithm. 2 mm thick sagittal and coronal reconstructions were performed and submitted in brain algorithm.  This is correlated to prior noncontrast  head CT from [facility] 01/09/2013.  FINDINGS: The brain parenchyma is normal in attenuation. The ventricles are normal in size. I see no mass effect and no midline shift and no extra-axial fluid collections are identified. There is no evidence of acute intracranial hemorrhage. There is complete opacification of the left maxillary sinus with circumferential mucosal thickening in the central, chronic inspissated secretions The remainder of the paranasal sinuses and mastoid air cells and middle ear cavities are clear. The calvarium and skull base and orbits are unremarkable.      Complete opacification of the left maxillary sinus with circumferential mucosal thickening and hyperdense central chronic inspissated secretions. The remainder of the head CT is normal.  The results were communicated to Dr. Smith, the Stroke Neurologist while the patient was still under the scanner 03/13/2020 at 12:44 PM and he requested a CT angiogram of the head and neck and CT perfusion study of the head for further evaluation.  CT ANGIOGRAM OF THE HEAD AND NECK AND CT PERFUSION STUDY  TECHNIQUE: Spiral CT images were obtained through the head during the arterial phase of contrast and images were reformatted and analyzed with rapid software analysis CT perfusion study of the head and subsequently spiral CT images were obtained from the top of the aortic arch up through the great vessels of the head and neck during arterial phase of contrast. Images were reformatted and submitted in 1 mm thick axial, sagittal and coronal CT sections. Additional 3D reconstructions were performed to complete the CT angiogram of the head and neck and finally spiral CT images were obtained from the base of skull to vertex delayed following intravenous contrast and images were reformatted and submitted in 3 mm thick axial CT section with brain algorithm.  FINDINGS:  CT PERFUSION: CT perfusion study consists of a 10 cm slab of brain imaging from  the mid cerebellar hemispheres through the cerebral hemispheres and demonstrates no areas of delayed time to maximal enhancement of greater than 6 seconds. Thus, no convincing hypoperfused areas and there are no areas of reduced cerebral blood flow to less than 30% with no areas of acute completed infarction in the 10 cm field of brain imaging.  CT ANGIOGRAM NECK: The nasopharynx, oropharynx, hypopharynx, true cords and subglottic airway are normal in appearance. 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 The right lobe of the thyroid is unremarkable. The lung apices are clear. The parotid glands are atrophic and there is mild atrophy of this submandibular glands. The  and parapharyngeal spaces are normal in appearance. No pathologic lymphadenopathy is seen in the neck. There is some minimal cervical spondylosis, minimal disc space narrowing and degenerative endplate changes at C4-5 and C5-6. There is uncovertebral joint spurring with mild left foraminal narrowing at C5-6 and C6-7. There is anatomic origin of great vessels off the aortic arch. The left subclavian artery origin is normal in appearance and no stenosis is seen in the left subclavian artery. There is focal mild-to-moderate stenosis at the left vertebral artery origin. Beyond its origin the left vertebral artery is widely patent without stenosis to the vertebrobasilar junction. The left common carotid origin is normal in appearance and no stenosis is seen in the left common carotid artery. Portions of the mid and left common carotid artery are obscured by motion artifact. The distal left common carotid artery and left common carotid bifurcation are normal in appearance. No stenosis is seen in the left internal carotid artery using the NASCET criteria. Brachycephalic artery origin is normal in appearance and no stenosis is seen in the brachycephalic artery. Its  bifurcation into the right subclavian and common carotid artery is normal in appearance. No stenosis is seen in the right subclavian artery. The right vertebral artery origin is normal in appearance and no stenosis is seen in the right vertebral artery from its origin to the vertebrobasilar junction. The right common carotid origin is normal in appearance. Portions of the mid right common carotid artery are obscured by motion artifact. The distal right common carotid artery and right common carotid bifurcation is normal in appearance and no stenosis is seen in the right internal carotid artery using the NASCET criteria.  CT ANGIOGRAM OF THE HEAD: CT angiographic images of the head demonstrate a normal appearance to the intracranial segments of the distal vertebral arteries as well as the basilar artery and basilar tip as well as the posterior cerebral and superior cerebellar arteries bilaterally. The upper cervical, petrous, cavernous and supracavernous segments of the internal carotid arteries are normal in appearance. The anterior and middle cerebral arteries are normal in appearance. The anterior communicating artery origin and middle cerebral artery trifurcations are normal in appearance.  IMPRESSION:  1. CT images of the head demonstrate complete opacification of the left maxillary sinus with circumferential mucosal thickening and hyperdense central chronic inspissated secretions. Otherwise the CT of the head is normal. The results of the noncontrast head CT were communicated to Dr. Smith from Stroke Neurology 03/13/2020 at 12:43 PM.  2. CT perfusion study demonstrates no areas of hypoperfusion and no areas of acute completed infarction in the 10 cm slab of brain imaging from the mid cerebellar hemispheres through the cerebral hemispheres.  3. There is a focal mild to up to mild-to-moderate stenosis of the left vertebral artery origin; otherwise, the remainder of the CT angiogram of the head and neck is  within normal limits with no stenosis seen in the great vessels of the head or neck.  The final dictated results were communicated to Dr. Smith from Stroke Neurology and Dr. Galeas by telephone on 03/13/2020 at 1:00 PM.   Radiation dose reduction techniques were utilized, including automated exposure control and exposure modulation based on body size.  This report was finalized on 3/13/2020 4:15 PM by Dr. Jamar Sears M.D.      Mri Brain With & Without Contrast    Result Date: 3/13/2020  STAT MRI OF THE BRAIN WITH AND WITHOUT CONTRAST 03/13/2020  CLINICAL HISTORY: Acute onset of vertigo, dizziness, nausea and vomiting, evaluate for acute stroke.  TECHNIQUE: Axial T1, FLAIR, fat-suppressed T2, axial diffusion and gradient echo T2, sagittal T1 and postcontrast axial fat-suppressed T1 and coronal T1-weighted images were obtained of the entire head.  Study is correlated to a contrast-enhanced CT angiogram of the head and neck and CT perfusion study of the head earlier today 03/13/2020 at 12:40 PM.  FINDINGS: The brain parenchyma is normal in signal intensity. Specifically no diffusion-weighted abnormality is seen with no acute infarct identified. On the gradient echo T2 weighted images no acute or old blood breakdown products are seen intracranially. The ventricles are normal in size. I see no focal mass effect. There is no midline shift. No extra-axial fluid collections are identified. No abnormal areas of enhancement are seen in the head. There is complete opacification of the left maxillary sinus with circumferential mucosal thickening of the central contents that are T1 hyperintense, T2 hypointense and on CT today were hyperdense and felt to be hyperdense chronic inspissated secretions or hemorrhagic or fungal contents.  The remainder of the paranasal sinuses, mastoid air cells and middle ear cavities are clear. Good flow voids are demonstrated within the cerebral vessels and in the dural venous sinuses.  The  calvarium and skull base demonstrate normal marrow signal intensity.  The orbits are unremarkable.      1. There is complete opacification of the left maxillary sinus with circumferential mucosal thickening of the central contents that are T1 hyperintense, T2 hypointense indicating chronic inspissated secretions, less likely hemorrhagic or fungal contents. 2. The remainder of the MRI of the head is within normal limits. Specifically no acute infarct is identified and the etiology of the acute onset vertigo and dizziness is not established on this exam. The results were communicated to Kenny Smith MD, by telephone 03/13/2020 at 3:25 PM.  This report was finalized on 3/13/2020 4:45 PM by Dr. Jamar Sears M.D.      Xr Chest 1 View    Result Date: 3/13/2020  XR CHEST 1 VW-  HISTORY: Male who is 79 years-old,  stroke  TECHNIQUE: Frontal view of the chest  COMPARISON: 03/08/2017  FINDINGS: The heart size is borderline. Aorta is tortuous. Pulmonary vasculature is unremarkable. No focal pulmonary consolidation, pleural effusion, or pneumothorax. No acute osseous process.      No evidence for acute pulmonary process. Borderline heart size. Tortuous aorta. Follow-up as clinically indicated.  This report was finalized on 3/13/2020 1:36 PM by Dr. Checo Flores M.D.      Ct Cerebral Perfusion With & Without Contrast    Result Date: 3/13/2020  EMERGENCY CONTRAST-ENHANCED CT ANGIOGRAM OF THE HEAD AND NECK WITH CT PERFUSION STUDY OF THE HEAD 03/13/2020  CLINICAL HISTORY: Acute onset dizziness, vomiting, possible stroke.  NONCONTRAST HEAD CT  TECHNIQUE: Spiral CT images were obtained from the base of the skull to the vertex without intravenous contrast and images were reformatted and submitted in 3 mm thick axial CT section with brain algorithm. 2 mm thick sagittal and coronal reconstructions were performed and submitted in brain algorithm.  This is correlated to prior noncontrast head CT from [facility]  01/09/2013.  FINDINGS: The brain parenchyma is normal in attenuation. The ventricles are normal in size. I see no mass effect and no midline shift and no extra-axial fluid collections are identified. There is no evidence of acute intracranial hemorrhage. There is complete opacification of the left maxillary sinus with circumferential mucosal thickening in the central, chronic inspissated secretions The remainder of the paranasal sinuses and mastoid air cells and middle ear cavities are clear. The calvarium and skull base and orbits are unremarkable.      Complete opacification of the left maxillary sinus with circumferential mucosal thickening and hyperdense central chronic inspissated secretions. The remainder of the head CT is normal.  The results were communicated to Dr. Smith, the Stroke Neurologist while the patient was still under the scanner 03/13/2020 at 12:44 PM and he requested a CT angiogram of the head and neck and CT perfusion study of the head for further evaluation.  CT ANGIOGRAM OF THE HEAD AND NECK AND CT PERFUSION STUDY  TECHNIQUE: Spiral CT images were obtained through the head during the arterial phase of contrast and images were reformatted and analyzed with rapid software analysis CT perfusion study of the head and subsequently spiral CT images were obtained from the top of the aortic arch up through the great vessels of the head and neck during arterial phase of contrast. Images were reformatted and submitted in 1 mm thick axial, sagittal and coronal CT sections. Additional 3D reconstructions were performed to complete the CT angiogram of the head and neck and finally spiral CT images were obtained from the base of skull to vertex delayed following intravenous contrast and images were reformatted and submitted in 3 mm thick axial CT section with brain algorithm.  FINDINGS:  CT PERFUSION: CT perfusion study consists of a 10 cm slab of brain imaging from the mid cerebellar hemispheres through  the cerebral hemispheres and demonstrates no areas of delayed time to maximal enhancement of greater than 6 seconds. Thus, no convincing hypoperfused areas and there are no areas of reduced cerebral blood flow to less than 30% with no areas of acute completed infarction in the 10 cm field of brain imaging.  CT ANGIOGRAM NECK: The nasopharynx, oropharynx, hypopharynx, true cords and subglottic airway are normal in appearance. 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 The right lobe of the thyroid is unremarkable. The lung apices are clear. The parotid glands are atrophic and there is mild atrophy of this submandibular glands. The  and parapharyngeal spaces are normal in appearance. No pathologic lymphadenopathy is seen in the neck. There is some minimal cervical spondylosis, minimal disc space narrowing and degenerative endplate changes at C4-5 and C5-6. There is uncovertebral joint spurring with mild left foraminal narrowing at C5-6 and C6-7. There is anatomic origin of great vessels off the aortic arch. The left subclavian artery origin is normal in appearance and no stenosis is seen in the left subclavian artery. There is focal mild-to-moderate stenosis at the left vertebral artery origin. Beyond its origin the left vertebral artery is widely patent without stenosis to the vertebrobasilar junction. The left common carotid origin is normal in appearance and no stenosis is seen in the left common carotid artery. Portions of the mid and left common carotid artery are obscured by motion artifact. The distal left common carotid artery and left common carotid bifurcation are normal in appearance. No stenosis is seen in the left internal carotid artery using the NASCET criteria. Brachycephalic artery origin is normal in appearance and no stenosis is seen in the brachycephalic artery. Its bifurcation into the right subclavian and  common carotid artery is normal in appearance. No stenosis is seen in the right subclavian artery. The right vertebral artery origin is normal in appearance and no stenosis is seen in the right vertebral artery from its origin to the vertebrobasilar junction. The right common carotid origin is normal in appearance. Portions of the mid right common carotid artery are obscured by motion artifact. The distal right common carotid artery and right common carotid bifurcation is normal in appearance and no stenosis is seen in the right internal carotid artery using the NASCET criteria.  CT ANGIOGRAM OF THE HEAD: CT angiographic images of the head demonstrate a normal appearance to the intracranial segments of the distal vertebral arteries as well as the basilar artery and basilar tip as well as the posterior cerebral and superior cerebellar arteries bilaterally. The upper cervical, petrous, cavernous and supracavernous segments of the internal carotid arteries are normal in appearance. The anterior and middle cerebral arteries are normal in appearance. The anterior communicating artery origin and middle cerebral artery trifurcations are normal in appearance.  IMPRESSION:  1. CT images of the head demonstrate complete opacification of the left maxillary sinus with circumferential mucosal thickening and hyperdense central chronic inspissated secretions. Otherwise the CT of the head is normal. The results of the noncontrast head CT were communicated to Dr. Smith from Stroke Neurology 03/13/2020 at 12:43 PM.  2. CT perfusion study demonstrates no areas of hypoperfusion and no areas of acute completed infarction in the 10 cm slab of brain imaging from the mid cerebellar hemispheres through the cerebral hemispheres.  3. There is a focal mild to up to mild-to-moderate stenosis of the left vertebral artery origin; otherwise, the remainder of the CT angiogram of the head and neck is within normal limits with no stenosis seen in  the great vessels of the head or neck.  The final dictated results were communicated to Dr. Smith from Stroke Neurology and Dr. Galeas by telephone on 03/13/2020 at 1:00 PM.   Radiation dose reduction techniques were utilized, including automated exposure control and exposure modulation based on body size.  This report was finalized on 3/13/2020 4:15 PM by Dr. Jamar Sears M.D.        Ordered the above noted radiological studies. Reviewed by me in PACS.  Spoke with  (radiologist) regarding results.          PROCEDURES  Critical Care  Performed by: Azeem Galeas MD  Authorized by: Azeem Galeas MD     Critical care provider statement:     Critical care time (minutes):  30    Critical care start time:  3/13/2020 12:20 PM    Critical care end time:  3/13/2020 1:53 PM    Critical care time was exclusive of:  Separately billable procedures and treating other patients    Critical care was necessary to treat or prevent imminent or life-threatening deterioration of the following conditions:  CNS failure or compromise    Critical care was time spent personally by me on the following activities:  Development of treatment plan with patient or surrogate, discussions with consultants, examination of patient, ordering and performing treatments and interventions, ordering and review of laboratory studies and ordering and review of radiographic studies        EKG:           EKG time: 1327  Rhythm/Rate: Sinus rhythm, rate 60  P waves and NC: Nml  QRS, axis: Nml axis, single PVC noted   ST and T waves: No acute ischemic changes      Interpreted Contemporaneously by me, independently viewed  Similar compared to prior 8/16/18          MEDICATIONS GIVEN IN ER  Medications   sodium chloride 0.9 % flush 10 mL (has no administration in time range)   Scopolamine (TRANSDERM-SCOP) 1.5 MG/3DAYS patch 1 patch (1 patch Transdermal Medication Applied 3/13/20 8034)   acetaminophen (TYLENOL) tablet 650 mg (650 mg Oral  Given 3/13/20 1801)   ondansetron (ZOFRAN) tablet 4 mg ( Oral Not Given:  See Alt 3/13/20 1801)     Or   ondansetron (ZOFRAN) injection 4 mg (4 mg Intravenous Given 3/13/20 1801)   docusate sodium (COLACE) capsule 100 mg (has no administration in time range)   guaiFENesin (MUCINEX) 12 hr tablet 600 mg (600 mg Oral Given 3/13/20 1801)   fluticasone (FLONASE) 50 MCG/ACT nasal spray 2 spray (2 sprays Each Nare Given 3/13/20 1801)   meclizine (ANTIVERT) tablet 25 mg (25 mg Oral Given 3/13/20 1801)   ondansetron (ZOFRAN) injection 4 mg (4 mg Intravenous Given 3/13/20 1240)   iopamidol (ISOVUE-370) 76 % injection 150 mL (150 mL Intravenous Given by Other 3/13/20 1248)   LORazepam (ATIVAN) injection 0.5 mg (0.5 mg Intravenous Given 3/13/20 1309)   dexamethasone (DECADRON) 10 mg/20ml NS IV syringe (10 mg Intravenous Given 3/13/20 1345)   aspirin chewable tablet 324 mg (324 mg Oral Given 3/13/20 1344)   gadobenate dimeglumine (MULTIHANCE) injection 20 mL (20 mL Intravenous Given 3/13/20 1447)             MEDICAL DECISION MAKING and ED Course    ED Course as of Mar 13 2155   Fri Mar 13, 2020   1221 79-year-old male arrives with acute onset of dizziness at approximately 11 AM.  He reported a brief episode around 8:00 this morning that then resolved.  He denied diplopia when obtaining his history, however when testing extraocular movements he did report diplopia.  He was also noted to have nystagmus.  He otherwise did not demonstrate ataxia but he reports that he is too dizzy to walk right now.  NIH stroke scale of 1 for slight decreased sensation to light touch in the left upper and left lower extremity.  Team D was called due to the possibility of posterior circulation stroke within the window for TPA.    [JR]   1321 Glucose(!): 147 [JR]   1321 INR: 1.03 [JR]   1321 Sodium(!): 135 [JR]   1321 Hemoglobin: 15.7 [JR]   1321 WBC: 10.64 [JR]      ED Course User Index  [JR] Azeem Galeas MD       9515: Spoke with   (stroke neurology) who recommended proceeding with imaging and will consult     1317:  (stroke neurology) is in ED and has evaluated pt. He agrees that pt is not a tpa candidate.     1319: Spoke with  (radiology) who reports that imaging was negative except for chronic opacification of maxillary sinus.     1351: Spoke with YOANDY Monroe ([facility]) who agreed to admit to  ([facility])    MDM  Number of Diagnoses or Management Options  Intractable vomiting with nausea, unspecified vomiting type:   Vertigo:      Amount and/or Complexity of Data Reviewed  Clinical lab tests: reviewed and ordered (Labs unremarkable)  Tests in the radiology section of CPT®: ordered and reviewed (CTA head, CT cereberal perfusion, CTA neck and XR chest negative acute. MRI brain pending.)  Tests in the medicine section of CPT®: reviewed and ordered (EKG- see procedure note for interpretation)  Discussion of test results with the performing providers: yes (- radiology)  Discuss the patient with other providers: yes (- stroke neurology  YOANDY Monroe- [facility])  Independent visualization of images, tracings, or specimens: yes    Patient Progress  Patient progress: stable       CT and CTA head and neck showing.  He has had persistent dizziness and vertigo as well as persistent nausea and vomiting despite Zofran and Ativan.  He will be admitted for MRI brain and further evaluation by neurology.  Labs reviewed and are reassuring.      DIAGNOSIS  Final diagnoses:   Vertigo   Intractable vomiting with nausea, unspecified vomiting type         DISPOSITION  ADMISSION    Discussed treatment plan and reason for admission with pt/family and admitting physician.  Pt/family voiced understanding of the plan for admission for further testing/treatment as needed.           Latest Documented Vital Signs:  As of 21:55  BP- 145/79 HR- 68 Temp- 97.5 °F (36.4 °C) (Oral) O2 sat- 94%        --  Documentation assistance provided by  alirio Leal for Dr. ELMER Galeas MD.  Information recorded by the mariyaibcaroline was done at my direction and has been verified and validated by me.      Please note that portions of this were completed with a voice recognition program.                                  Kimberlee Leal  03/13/20 1412       Azeem Galeas MD  03/13/20 9476

## 2020-03-13 NOTE — ED NOTES
Nursing report ED to floor  Asa Magaña  79 y.o.  male    HPI (triage note):   Chief Complaint   Patient presents with   • Dizziness       Admitting doctor:   Bryan Harley MD    Admitting diagnosis:   The primary encounter diagnosis was Vertigo. A diagnosis of Intractable vomiting with nausea, unspecified vomiting type was also pertinent to this visit.    Code status:   Current Code Status     Date Active Code Status Order ID Comments User Context       3/13/2020 1428 CPR [id number]  Kiko Olson APRN ED       Questions for Current Code Status     Question Answer Comment    Code Status CPR     Medical Interventions (Level of Support Prior to Arrest) Full           Allergies:   Ciprofloxacin and Quinolones    Weight:       03/13/20  1235   Weight: 101 kg (222 lb 6.4 oz)       Most recent vitals:   Vitals:    03/13/20 1235 03/13/20 1300 03/13/20 1330 03/13/20 1400   BP:  164/85 145/83 146/86   Pulse:   57 57   Resp:       Temp:       SpO2:   94% 96%   Weight: 101 kg (222 lb 6.4 oz)      Height:           Active LDAs/IV Access:   Lines, Drains & Airways    Active LDAs     Name:   Placement date:   Placement time:   Site:   Days:    Peripheral IV 03/13/20 1205 Left Forearm   03/13/20    1205    Forearm   less than 1    Peripheral IV 03/13/20 1230 Right Antecubital   03/13/20    1230    Antecubital   less than 1                Labs (abnormal labs have a star):   Labs Reviewed   COMPREHENSIVE METABOLIC PANEL - Abnormal; Notable for the following components:       Result Value    Glucose 147 (*)     Sodium 135 (*)     CO2 21.0 (*)     Calcium 8.3 (*)     eGFR Non  Amer 59 (*)     All other components within normal limits    Narrative:     GFR Normal >60  Chronic Kidney Disease <60  Kidney Failure <15     CBC WITH AUTO DIFFERENTIAL - Abnormal; Notable for the following components:    Lymphocyte % 17.8 (*)     Immature Grans % 0.8 (*)     Neutrophils, Absolute 7.39 (*)     Monocytes, Absolute 1.16 (*)      Immature Grans, Absolute 0.08 (*)     All other components within normal limits   POCT GLUCOSE FINGERSTICK - Abnormal; Notable for the following components:    Glucose 135 (*)     All other components within normal limits   PROTIME-INR - Normal   APTT - Normal   TROPONIN (IN-HOUSE) - Normal    Narrative:     Troponin T Reference Range:  <= 0.03 ng/mL-   Negative for AMI  >0.03 ng/mL-     Abnormal for myocardial necrosis.  Clinicians would have to utilize clinical acumen, EKG, Troponin and serial changes to determine if it is an Acute Myocardial Infarction or myocardial injury due to an underlying chronic condition.       Results may be falsely decreased if patient taking Biotin.     POCT CREATININE - Normal   RAINBOW DRAW    Narrative:     The following orders were created for panel order [place] Draw.  Procedure                               Abnormality         Status                     ---------                               -----------         ------                     Light Blue Top[[id number]]                                   Final result               Green Top (Gel)[[id number]]                                  Final result               Lavender Top[[id number]]                                     Final result               Gold Top - SST[[id number]]                                   Final result                 Please view results for these tests on the individual orders.   POCT GLUCOSE FINGERSTICK   TYPE AND SCREEN   CBC AND DIFFERENTIAL    Narrative:     The following orders were created for panel order CBC & Differential.  Procedure                               Abnormality         Status                     ---------                               -----------         ------                     CBC Auto Differential[[id number]]        Abnormal            Final result                 Please view results for these tests on the individual orders.   LIGHT BLUE TOP   GREEN TOP   LAVENDER TOP   GOLD TOP - SST        EKG:   ECG 12 Lead   Preliminary Result   HEART RATE= 60  bpm   RR Interval= 996  ms   MN Interval= 191  ms   P Horizontal Axis= -2  deg   P Front Axis= 78  deg   QRSD Interval= 94  ms   QT Interval= 427  ms   QRS Axis= 37  deg   T Wave Axis= 58  deg   - OTHERWISE NORMAL ECG -   Sinus rhythm   Ventricular premature complex   Low voltage, precordial leads   Electronically Signed By:    Date and Time of Study: 2020-03-13 13:27:13          Meds given in ED:   Medications   sodium chloride 0.9 % flush 10 mL (has no administration in time range)   Scopolamine (TRANSDERM-SCOP) 1.5 MG/3DAYS patch 1 patch (1 patch Transdermal Medication Applied 3/13/20 3856)   sodium chloride 0.9 % flush 10 mL (has no administration in time range)   sodium chloride 0.9 % flush 10 mL (has no administration in time range)   acetaminophen (TYLENOL) tablet 650 mg (has no administration in time range)   ondansetron (ZOFRAN) tablet 4 mg (has no administration in time range)     Or   ondansetron (ZOFRAN) injection 4 mg (has no administration in time range)   docusate sodium (COLACE) capsule 100 mg (has no administration in time range)   guaiFENesin (MUCINEX) 12 hr tablet 600 mg (has no administration in time range)   fluticasone (FLONASE) 50 MCG/ACT nasal spray 2 spray (has no administration in time range)   ondansetron (ZOFRAN) injection 4 mg (4 mg Intravenous Given 3/13/20 1240)   iopamidol (ISOVUE-370) 76 % injection 150 mL (150 mL Intravenous Given by Other 3/13/20 1248)   LORazepam (ATIVAN) injection 0.5 mg (0.5 mg Intravenous Given 3/13/20 1309)   dexamethasone (DECADRON) 10 mg/20ml NS IV syringe (10 mg Intravenous Given 3/13/20 1345)   aspirin chewable tablet 324 mg (324 mg Oral Given 3/13/20 1344)   gadobenate dimeglumine (MULTIHANCE) injection 20 mL (20 mL Intravenous Given 3/13/20 1447)       Imaging results:  Ct Angiogram Head    Result Date: 3/13/2020  Complete opacification of the left maxillary sinus with circumferential  mucosal thickening and hyperdense central chronic inspissated secretions. The remainder of the head CT is normal.  The results were communicated to Dr. Smith, the Stroke Neurologist while the patient was still under the scanner 03/13/2020 at 12:44 PM and he requested a CT angiogram of the head and neck and CT perfusion study of the head for further evaluation.  CT ANGIOGRAM OF THE HEAD AND NECK AND CT PERFUSION STUDY  TECHNIQUE: Spiral CT images were obtained through the head during the arterial phase of contrast and images were reformatted and analyzed with rapid software analysis CT perfusion study of the head and subsequently spiral CT images were obtained from the top of the aortic arch up through the great vessels of the head and neck during arterial phase of contrast. Images were reformatted and submitted in 1 mm thick axial, sagittal and coronal CT sections. Additional 3D reconstructions were performed to complete the CT angiogram of the head and neck and finally spiral CT images were obtained from the base of skull to vertex delayed following intravenous contrast and images were reformatted and submitted in 3 mm thick axial CT section with brain algorithm.  FINDINGS:  CT PERFUSION: CT perfusion study consists of a 10 cm slab of brain imaging from the mid cerebellar hemispheres through the cerebral hemispheres and demonstrates no areas of delayed time to maximal enhancement of greater than 6 seconds. Thus, no convincing hypoperfused areas and there are no areas of reduced cerebral blood flow to less than 30% with no areas of acute completed infarction in the 10 cm field of brain imaging.  CT ANGIOGRAM NECK: The nasopharynx, oropharynx, hypopharynx, true cords and subglottic airway are normal in appearance. 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 The right lobe of the thyroid is unremarkable. The lung apices are clear. The  parotid glands are atrophic and there is mild atrophy of this submandibular glands. The  and parapharyngeal spaces are normal in appearance. No pathologic lymphadenopathy is seen in the neck. There is some minimal cervical spondylosis, minimal disc space narrowing and degenerative endplate changes at C4-5 and C5-6. There is uncovertebral joint spurring with mild left foraminal narrowing at C5-6 and C6-7. There is anatomic origin of great vessels off the aortic arch. The left subclavian artery origin is normal in appearance and no stenosis is seen in the left subclavian artery. There is focal mild-to-moderate stenosis at the left vertebral artery origin. Beyond its origin the left vertebral artery is widely patent without stenosis to the vertebrobasilar junction. The left common carotid origin is normal in appearance and no stenosis is seen in the left common carotid artery. Portions of the mid and left common carotid artery are obscured by motion artifact. The distal left common carotid artery and left common carotid bifurcation are normal in appearance. No stenosis is seen in the left internal carotid artery using the NASCET criteria. Brachycephalic artery origin is normal in appearance and no stenosis is seen in the brachycephalic artery. Its bifurcation into the right subclavian and common carotid artery is normal in appearance. No stenosis is seen in the right subclavian artery. The right vertebral artery origin is normal in appearance and no stenosis is seen in the right vertebral artery from its origin to the vertebrobasilar junction. The right common carotid origin is normal in appearance. Portions of the mid right common carotid artery are obscured by motion artifact. The distal right common carotid artery and right common carotid bifurcation is normal in appearance and no stenosis is seen in the right internal carotid artery using the NASCET criteria.  CT ANGIOGRAM OF THE HEAD: CT angiographic  images of the head demonstrate a normal appearance to the intracranial segments of the distal vertebral arteries as well as the basilar artery and basilar tip as well as the posterior cerebral and superior cerebellar arteries bilaterally. The upper cervical, petrous, cavernous and supracavernous segments of the internal carotid arteries are normal in appearance. The anterior and middle cerebral arteries are normal in appearance. The anterior communicating artery origin and middle cerebral artery trifurcations are normal in appearance.  IMPRESSION:  1. CT images of the head demonstrate complete opacification of the left maxillary sinus. There is circumferential mucosal thickening and hyperdense central chronic inspissated secretions. Otherwise the CT of the head is normal. The results of the noncontrast head CT were communicated to Dr. Smith from Stroke Neurology 03/13/2020 at 12:43 PM.  2. CT perfusion study demonstrates no areas of hypoperfusion and no areas of acute completed infarction in the 10 cm slab of brain imaging from the mid cerebellar hemispheres through the cerebral hemispheres.  3. There is a focal mild to up to mild-to-moderate stenosis of the left vertebral artery origin; otherwise, the remainder of the CT angiogram of the head and neck is within normal limits with no stenosis seen in the great vessels of the head or neck.  The final dictated results were communicated to Dr. Smith from Stroke Neurology and Dr. Galeas by telephone on 03/13/2020 at 1:00 PM.   Radiation dose reduction techniques were utilized, including automated exposure control and exposure modulation based on body size.       Ct Angiogram Neck    Result Date: 3/13/2020  Complete opacification of the left maxillary sinus with circumferential mucosal thickening and hyperdense central chronic inspissated secretions. The remainder of the head CT is normal.  The results were communicated to Dr. Smith, the Stroke Neurologist  while the patient was still under the scanner 03/13/2020 at 12:44 PM and he requested a CT angiogram of the head and neck and CT perfusion study of the head for further evaluation.  CT ANGIOGRAM OF THE HEAD AND NECK AND CT PERFUSION STUDY  TECHNIQUE: Spiral CT images were obtained through the head during the arterial phase of contrast and images were reformatted and analyzed with rapid software analysis CT perfusion study of the head and subsequently spiral CT images were obtained from the top of the aortic arch up through the great vessels of the head and neck during arterial phase of contrast. Images were reformatted and submitted in 1 mm thick axial, sagittal and coronal CT sections. Additional 3D reconstructions were performed to complete the CT angiogram of the head and neck and finally spiral CT images were obtained from the base of skull to vertex delayed following intravenous contrast and images were reformatted and submitted in 3 mm thick axial CT section with brain algorithm.  FINDINGS:  CT PERFUSION: CT perfusion study consists of a 10 cm slab of brain imaging from the mid cerebellar hemispheres through the cerebral hemispheres and demonstrates no areas of delayed time to maximal enhancement of greater than 6 seconds. Thus, no convincing hypoperfused areas and there are no areas of reduced cerebral blood flow to less than 30% with no areas of acute completed infarction in the 10 cm field of brain imaging.  CT ANGIOGRAM NECK: The nasopharynx, oropharynx, hypopharynx, true cords and subglottic airway are normal in appearance. 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 The right lobe of the thyroid is unremarkable. The lung apices are clear. The parotid glands are atrophic and there is mild atrophy of this submandibular glands. The  and parapharyngeal spaces are normal in appearance. No pathologic lymphadenopathy is seen  in the neck. There is some minimal cervical spondylosis, minimal disc space narrowing and degenerative endplate changes at C4-5 and C5-6. There is uncovertebral joint spurring with mild left foraminal narrowing at C5-6 and C6-7. There is anatomic origin of great vessels off the aortic arch. The left subclavian artery origin is normal in appearance and no stenosis is seen in the left subclavian artery. There is focal mild-to-moderate stenosis at the left vertebral artery origin. Beyond its origin the left vertebral artery is widely patent without stenosis to the vertebrobasilar junction. The left common carotid origin is normal in appearance and no stenosis is seen in the left common carotid artery. Portions of the mid and left common carotid artery are obscured by motion artifact. The distal left common carotid artery and left common carotid bifurcation are normal in appearance. No stenosis is seen in the left internal carotid artery using the NASCET criteria. Brachycephalic artery origin is normal in appearance and no stenosis is seen in the brachycephalic artery. Its bifurcation into the right subclavian and common carotid artery is normal in appearance. No stenosis is seen in the right subclavian artery. The right vertebral artery origin is normal in appearance and no stenosis is seen in the right vertebral artery from its origin to the vertebrobasilar junction. The right common carotid origin is normal in appearance. Portions of the mid right common carotid artery are obscured by motion artifact. The distal right common carotid artery and right common carotid bifurcation is normal in appearance and no stenosis is seen in the right internal carotid artery using the NASCET criteria.  CT ANGIOGRAM OF THE HEAD: CT angiographic images of the head demonstrate a normal appearance to the intracranial segments of the distal vertebral arteries as well as the basilar artery and basilar tip as well as the posterior cerebral  and superior cerebellar arteries bilaterally. The upper cervical, petrous, cavernous and supracavernous segments of the internal carotid arteries are normal in appearance. The anterior and middle cerebral arteries are normal in appearance. The anterior communicating artery origin and middle cerebral artery trifurcations are normal in appearance.  IMPRESSION:  1. CT images of the head demonstrate complete opacification of the left maxillary sinus. There is circumferential mucosal thickening and hyperdense central chronic inspissated secretions. Otherwise the CT of the head is normal. The results of the noncontrast head CT were communicated to Dr. Smith from Stroke Neurology 03/13/2020 at 12:43 PM.  2. CT perfusion study demonstrates no areas of hypoperfusion and no areas of acute completed infarction in the 10 cm slab of brain imaging from the mid cerebellar hemispheres through the cerebral hemispheres.  3. There is a focal mild to up to mild-to-moderate stenosis of the left vertebral artery origin; otherwise, the remainder of the CT angiogram of the head and neck is within normal limits with no stenosis seen in the great vessels of the head or neck.  The final dictated results were communicated to Dr. Smith from Stroke Neurology and Dr. Galeas by telephone on 03/13/2020 at 1:00 PM.   Radiation dose reduction techniques were utilized, including automated exposure control and exposure modulation based on body size.       Xr Chest 1 View    Result Date: 3/13/2020  No evidence for acute pulmonary process. Borderline heart size. Tortuous aorta. Follow-up as clinically indicated.  This report was finalized on 3/13/2020 1:36 PM by Dr. Checo Flores M.D.      Ct Cerebral Perfusion With & Without Contrast    Result Date: 3/13/2020  Complete opacification of the left maxillary sinus with circumferential mucosal thickening and hyperdense central chronic inspissated secretions. The remainder of the head CT is  normal.  The results were communicated to Dr. Smith, the Stroke Neurologist while the patient was still under the scanner 03/13/2020 at 12:44 PM and he requested a CT angiogram of the head and neck and CT perfusion study of the head for further evaluation.  CT ANGIOGRAM OF THE HEAD AND NECK AND CT PERFUSION STUDY  TECHNIQUE: Spiral CT images were obtained through the head during the arterial phase of contrast and images were reformatted and analyzed with rapid software analysis CT perfusion study of the head and subsequently spiral CT images were obtained from the top of the aortic arch up through the great vessels of the head and neck during arterial phase of contrast. Images were reformatted and submitted in 1 mm thick axial, sagittal and coronal CT sections. Additional 3D reconstructions were performed to complete the CT angiogram of the head and neck and finally spiral CT images were obtained from the base of skull to vertex delayed following intravenous contrast and images were reformatted and submitted in 3 mm thick axial CT section with brain algorithm.  FINDINGS:  CT PERFUSION: CT perfusion study consists of a 10 cm slab of brain imaging from the mid cerebellar hemispheres through the cerebral hemispheres and demonstrates no areas of delayed time to maximal enhancement of greater than 6 seconds. Thus, no convincing hypoperfused areas and there are no areas of reduced cerebral blood flow to less than 30% with no areas of acute completed infarction in the 10 cm field of brain imaging.  CT ANGIOGRAM NECK: The nasopharynx, oropharynx, hypopharynx, true cords and subglottic airway are normal in appearance. 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 The right lobe of the thyroid is unremarkable. The lung apices are clear. The parotid glands are atrophic and there is mild atrophy of this submandibular glands. The  and  parapharyngeal spaces are normal in appearance. No pathologic lymphadenopathy is seen in the neck. There is some minimal cervical spondylosis, minimal disc space narrowing and degenerative endplate changes at C4-5 and C5-6. There is uncovertebral joint spurring with mild left foraminal narrowing at C5-6 and C6-7. There is anatomic origin of great vessels off the aortic arch. The left subclavian artery origin is normal in appearance and no stenosis is seen in the left subclavian artery. There is focal mild-to-moderate stenosis at the left vertebral artery origin. Beyond its origin the left vertebral artery is widely patent without stenosis to the vertebrobasilar junction. The left common carotid origin is normal in appearance and no stenosis is seen in the left common carotid artery. Portions of the mid and left common carotid artery are obscured by motion artifact. The distal left common carotid artery and left common carotid bifurcation are normal in appearance. No stenosis is seen in the left internal carotid artery using the NASCET criteria. Brachycephalic artery origin is normal in appearance and no stenosis is seen in the brachycephalic artery. Its bifurcation into the right subclavian and common carotid artery is normal in appearance. No stenosis is seen in the right subclavian artery. The right vertebral artery origin is normal in appearance and no stenosis is seen in the right vertebral artery from its origin to the vertebrobasilar junction. The right common carotid origin is normal in appearance. Portions of the mid right common carotid artery are obscured by motion artifact. The distal right common carotid artery and right common carotid bifurcation is normal in appearance and no stenosis is seen in the right internal carotid artery using the NASCET criteria.  CT ANGIOGRAM OF THE HEAD: CT angiographic images of the head demonstrate a normal appearance to the intracranial segments of the distal vertebral  arteries as well as the basilar artery and basilar tip as well as the posterior cerebral and superior cerebellar arteries bilaterally. The upper cervical, petrous, cavernous and supracavernous segments of the internal carotid arteries are normal in appearance. The anterior and middle cerebral arteries are normal in appearance. The anterior communicating artery origin and middle cerebral artery trifurcations are normal in appearance.  IMPRESSION:  1. CT images of the head demonstrate complete opacification of the left maxillary sinus. There is circumferential mucosal thickening and hyperdense central chronic inspissated secretions. Otherwise the CT of the head is normal. The results of the noncontrast head CT were communicated to Dr. Smith from Stroke Neurology 03/13/2020 at 12:43 PM.  2. CT perfusion study demonstrates no areas of hypoperfusion and no areas of acute completed infarction in the 10 cm slab of brain imaging from the mid cerebellar hemispheres through the cerebral hemispheres.  3. There is a focal mild to up to mild-to-moderate stenosis of the left vertebral artery origin; otherwise, the remainder of the CT angiogram of the head and neck is within normal limits with no stenosis seen in the great vessels of the head or neck.  The final dictated results were communicated to Dr. Smith from Stroke Neurology and Dr. Galeas by telephone on 03/13/2020 at 1:00 PM.   Radiation dose reduction techniques were utilized, including automated exposure control and exposure modulation based on body size.         Ambulatory status:   - Up with assistance as tolerated    Social issues:   Social History     Socioeconomic History   • Marital status:      Spouse name: Not on file   • Number of children: Not on file   • Years of education: Not on file   • Highest education level: Not on file   Tobacco Use   • Smoking status: Never Smoker   • Smokeless tobacco: Never Used   Substance and Sexual Activity   •  Alcohol use: Yes     Comment: seldom   • Drug use: No   • Sexual activity: Eric Solorio, RN  03/13/20 6978

## 2020-03-13 NOTE — ED TRIAGE NOTES
Patient presents to er via ems from [facility].  Patient was seated and had abrupt onset of dizziness with nausea/vomiting.

## 2020-03-13 NOTE — CONSULTS
Neurology Note    Patient:  Asa Magaña    YOB: 1940    REFERRING PHYSICIAN:  Dr. Tesfaye Galeas    CHIEF COMPLAINT:    dizziness    HISTORY OF PRESENT ILLNESS:   The patient is a 79 y.o. male with HTN, HLP, developed acute onset vertigo/sensation of room spinning this am around 0800 that improved or resolved but came back around 1100 a/w N/V. He was noted to have somewhat muffled speech and LLE numbness on exam. He has had a left knee replacement. He has received IVF, Zofran by EMS. Denies having a headache, prior attacks of vertigo, cold symptoms, fever. In ED /81, NSR,, T97.    Past Medical History:  Past Medical History:   Diagnosis Date   • Asthma    • BPH (benign prostatic hyperplasia)    • History of kidney stones    • History of sepsis 02/2017    BLADDER INFECTION AND PNEUMONIA BOTH TWICE   • Hx of migraine headaches    • Hyperlipidemia    • Hypothyroidism    • Knee pain    • Skin abrasion     LEFT FOREARM THAT HAS NOW HEALED       Past Surgical History:  Past Surgical History:   Procedure Laterality Date   • COLONOSCOPY  2016    benign polyp-repeat 5 years- Filemon Beasley M.D.   • CYSTOSCOPY TRANSURETHRAL RESECTION OF PROSTATE     • CYSTOSCOPY W/ URETERAL STENT PLACEMENT Right 1/20/2017    Procedure: CYSTOSCOPY, ureteroscopy,  URETERAL STENT INSERTION, RT;  Surgeon: Shashi Arciniega MD;  Location: [facility];  Service:    • HERNIA REPAIR     • KNEE ARTHROPLASTY UNICOMPARTMENTAL Left 9/28/2018    Procedure: KNEE ARTHROPLASTY UNICOMPARTMENTAL;  Surgeon: Davin Beasley MD;  Location: [facility];  Service: Orthopedics   • KNEE SURGERY      KNEE ARTHROSCOPY X3   • THYROIDECTOMY         Social History:   Social History     Socioeconomic History   • Marital status:      Spouse name: Not on file   • Number of children: Not on file   • Years of education: Not on file   • Highest education level: Not on file   Tobacco Use   • Smoking status: Never Smoker   • Smokeless  tobacco: Never Used   Substance and Sexual Activity   • Alcohol use: Yes     Comment: seldom   • Drug use: No   • Sexual activity: Defer        Family History:   Family History   Problem Relation Age of Onset   • Diabetes Mother    • Heart disease Mother    • Diabetes Father    • Heart disease Father    • Malig Hyperthermia Neg Hx        Medications Prior to Admission:    Prior to Admission medications    Medication Sig Start Date End Date Taking? Authorizing Provider   acetaminophen (TYLENOL) 325 MG tablet Take 2 tablets by mouth Every 4 (Four) Hours As Needed for Mild Pain (1-3). 3/11/17   uJstin Bonilla MD   finasteride (PROSCAR) 5 MG tablet Take 5 mg by mouth Daily.    ProviderLadonna MD   levothyroxine (SYNTHROID, LEVOTHROID) 75 MCG tablet Take 75 mcg by mouth Every Morning.    Ladonna Miranda MD   ondansetron (ZOFRAN) 4 MG tablet Take 1 tablet by mouth Every 6 (Six) Hours As Needed for Nausea or Vomiting for up to 10 doses. 9/28/18   Davin Beasley MD   rosuvastatin (CRESTOR) 5 MG tablet Take 5 mg by mouth Every Night.    ProviderLadonna MD   SUMAtriptan (IMITREX) 50 MG tablet Take one tablet at onset of headache. May repeat dose one time in 2 hours if headache not relieved.  Patient taking differently: Take 50 mg by mouth 1 (One) Time As Needed. Take one tablet at onset of headache. May repeat dose one time in 2 hours if headache not relieved. 3/11/17   Justin Bonilla MD       Allergies:  Ciprofloxacin and Quinolones      Review of system  Review of Systems   Gastrointestinal: Positive for nausea.   Neurological: Positive for dizziness.   All other systems reviewed and are negative.      Vitals:    03/13/20 1205   BP:    Pulse:    Resp:    Temp: 97 °F (36.1 °C)   SpO2:        Physical exam  Physical Exam   Constitutional: He is oriented to person, place, and time. He appears well-developed and well-nourished.   HENT:   Head: Normocephalic and atraumatic.   Eyes: Pupils are equal,  round, and reactive to light. EOM are normal.   Neck: Carotid bruit is not present.   Cardiovascular: Normal rate and regular rhythm.   Pulmonary/Chest: Effort normal.   Neurological: He is alert and oriented to person, place, and time. He has normal reflexes. He displays normal reflexes. A sensory deficit is present. No cranial nerve deficit. He exhibits normal muscle tone. Coordination normal. He displays no Babinski's sign on the right side. He displays no Babinski's sign on the left side.   Prominent nystagmus on right end gaze, speech slightly muffled, right corner of mouth trace lower but no definite facial droop, VFF, no limb drift, reports decreased sensation to cold and touch left lower leg.   Psychiatric: He has a normal mood and affect. His behavior is normal. Thought content normal.         Lab Results   Component Value Date    WBC 10.64 03/13/2020    HGB 15.7 03/13/2020    HCT 46.3 03/13/2020    MCV 92.2 03/13/2020     03/13/2020     Lab Results   Component Value Date    GLUCOSE 147 (H) 03/13/2020    BUN 19 03/13/2020    CREATININE 1.19 03/13/2020    EGFRIFNONA 59 (L) 03/13/2020    BCR 16.0 03/13/2020    CO2 21.0 (L) 03/13/2020    CALCIUM 8.3 (L) 03/13/2020    ALBUMIN 4.40 03/13/2020    LABIL2 1.7 10/21/2019    AST 22 03/13/2020    ALT 21 03/13/2020         Radiological Studies:    Ct Angiogram Head    Result Date: 3/13/2020  EMERGENCY CONTRAST-ENHANCED CT ANGIOGRAM OF THE HEAD AND NECK WITH CT PERFUSION STUDY OF THE HEAD 03/13/2020  CLINICAL HISTORY: Acute onset dizziness, vomiting, possible stroke.  NONCONTRAST HEAD CT  TECHNIQUE: Spiral CT images were obtained from the base of the skull to the vertex without intravenous contrast and images were reformatted and submitted in 3 mm thick axial CT section with brain algorithm. 2 mm thick sagittal and coronal reconstructions were performed and submitted in brain algorithm.  This is correlated to prior noncontrast head CT from [facility]  [place] 01/09/2013.  FINDINGS: The brain parenchyma is normal in attenuation. The ventricles are normal in size. I see no mass effect and no midline shift and no extra-axial fluid collections are identified. There is no evidence of acute intracranial hemorrhage. There is complete opacification of the left maxillary sinus with circumferential mucosal thickening in the central, chronic inspissated secretions The remainder of the paranasal sinuses and mastoid air cells and middle ear cavities are clear. The calvarium and skull base and orbits are unremarkable.      Complete opacification of the left maxillary sinus with circumferential mucosal thickening and hyperdense central chronic inspissated secretions. The remainder of the head CT is normal.  The results were communicated to Dr. Smith, the Stroke Neurologist while the patient was still under the scanner 03/13/2020 at 12:44 PM and he requested a CT angiogram of the head and neck and CT perfusion study of the head for further evaluation.  CT ANGIOGRAM OF THE HEAD AND NECK AND CT PERFUSION STUDY  TECHNIQUE: Spiral CT images were obtained through the head during the arterial phase of contrast and images were reformatted and analyzed with rapid software analysis CT perfusion study of the head and subsequently spiral CT images were obtained from the top of the aortic arch up through the great vessels of the head and neck during arterial phase of contrast. Images were reformatted and submitted in 1 mm thick axial, sagittal and coronal CT sections. Additional 3D reconstructions were performed to complete the CT angiogram of the head and neck and finally spiral CT images were obtained from the base of skull to vertex delayed following intravenous contrast and images were reformatted and submitted in 3 mm thick axial CT section with brain algorithm.  FINDINGS:  CT PERFUSION: CT perfusion study consists of a 10 cm slab of brain imaging from the mid cerebellar  hemispheres through the cerebral hemispheres and demonstrates no areas of delayed time to maximal enhancement of greater than 6 seconds. Thus, no convincing hypoperfused areas and there are no areas of reduced cerebral blood flow to less than 30% with no areas of acute completed infarction in the 10 cm field of brain imaging.  CT ANGIOGRAM NECK: The nasopharynx, oropharynx, hypopharynx, true cords and subglottic airway are normal in appearance. 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 The right lobe of the thyroid is unremarkable. The lung apices are clear. The parotid glands are atrophic and there is mild atrophy of this submandibular glands. The  and parapharyngeal spaces are normal in appearance. No pathologic lymphadenopathy is seen in the neck. There is some minimal cervical spondylosis, minimal disc space narrowing and degenerative endplate changes at C4-5 and C5-6. There is uncovertebral joint spurring with mild left foraminal narrowing at C5-6 and C6-7. There is anatomic origin of great vessels off the aortic arch. The left subclavian artery origin is normal in appearance and no stenosis is seen in the left subclavian artery. There is focal mild-to-moderate stenosis at the left vertebral artery origin. Beyond its origin the left vertebral artery is widely patent without stenosis to the vertebrobasilar junction. The left common carotid origin is normal in appearance and no stenosis is seen in the left common carotid artery. Portions of the mid and left common carotid artery are obscured by motion artifact. The distal left common carotid artery and left common carotid bifurcation are normal in appearance. No stenosis is seen in the left internal carotid artery using the NASCET criteria. Brachycephalic artery origin is normal in appearance and no stenosis is seen in the brachycephalic artery. Its bifurcation into the  right subclavian and common carotid artery is normal in appearance. No stenosis is seen in the right subclavian artery. The right vertebral artery origin is normal in appearance and no stenosis is seen in the right vertebral artery from its origin to the vertebrobasilar junction. The right common carotid origin is normal in appearance. Portions of the mid right common carotid artery are obscured by motion artifact. The distal right common carotid artery and right common carotid bifurcation is normal in appearance and no stenosis is seen in the right internal carotid artery using the NASCET criteria.  CT ANGIOGRAM OF THE HEAD: CT angiographic images of the head demonstrate a normal appearance to the intracranial segments of the distal vertebral arteries as well as the basilar artery and basilar tip as well as the posterior cerebral and superior cerebellar arteries bilaterally. The upper cervical, petrous, cavernous and supracavernous segments of the internal carotid arteries are normal in appearance. The anterior and middle cerebral arteries are normal in appearance. The anterior communicating artery origin and middle cerebral artery trifurcations are normal in appearance.  IMPRESSION:  1. CT images of the head demonstrate complete opacification of the left maxillary sinus. There is circumferential mucosal thickening and hyperdense central chronic inspissated secretions. Otherwise the CT of the head is normal. The results of the noncontrast head CT were communicated to Dr. Smith from Stroke Neurology 03/13/2020 at 12:43 PM.  2. CT perfusion study demonstrates no areas of hypoperfusion and no areas of acute completed infarction in the 10 cm slab of brain imaging from the mid cerebellar hemispheres through the cerebral hemispheres.  3. There is a focal mild to up to mild-to-moderate stenosis of the left vertebral artery origin; otherwise, the remainder of the CT angiogram of the head and neck is within normal limits  with no stenosis seen in the great vessels of the head or neck.  The final dictated results were communicated to Dr. Smith from Stroke Neurology and Dr. Galeas by telephone on 03/13/2020 at 1:00 PM.   Radiation dose reduction techniques were utilized, including automated exposure control and exposure modulation based on body size.       Ct Angiogram Neck    Result Date: 3/13/2020  EMERGENCY CONTRAST-ENHANCED CT ANGIOGRAM OF THE HEAD AND NECK WITH CT PERFUSION STUDY OF THE HEAD 03/13/2020  CLINICAL HISTORY: Acute onset dizziness, vomiting, possible stroke.  NONCONTRAST HEAD CT  TECHNIQUE: Spiral CT images were obtained from the base of the skull to the vertex without intravenous contrast and images were reformatted and submitted in 3 mm thick axial CT section with brain algorithm. 2 mm thick sagittal and coronal reconstructions were performed and submitted in brain algorithm.  This is correlated to prior noncontrast head CT from [facility] 01/09/2013.  FINDINGS: The brain parenchyma is normal in attenuation. The ventricles are normal in size. I see no mass effect and no midline shift and no extra-axial fluid collections are identified. There is no evidence of acute intracranial hemorrhage. There is complete opacification of the left maxillary sinus with circumferential mucosal thickening in the central, chronic inspissated secretions The remainder of the paranasal sinuses and mastoid air cells and middle ear cavities are clear. The calvarium and skull base and orbits are unremarkable.      Complete opacification of the left maxillary sinus with circumferential mucosal thickening and hyperdense central chronic inspissated secretions. The remainder of the head CT is normal.  The results were communicated to Dr. Smith, the Stroke Neurologist while the patient was still under the scanner 03/13/2020 at 12:44 PM and he requested a CT angiogram of the head and neck and CT perfusion study of the head  for further evaluation.  CT ANGIOGRAM OF THE HEAD AND NECK AND CT PERFUSION STUDY  TECHNIQUE: Spiral CT images were obtained through the head during the arterial phase of contrast and images were reformatted and analyzed with rapid software analysis CT perfusion study of the head and subsequently spiral CT images were obtained from the top of the aortic arch up through the great vessels of the head and neck during arterial phase of contrast. Images were reformatted and submitted in 1 mm thick axial, sagittal and coronal CT sections. Additional 3D reconstructions were performed to complete the CT angiogram of the head and neck and finally spiral CT images were obtained from the base of skull to vertex delayed following intravenous contrast and images were reformatted and submitted in 3 mm thick axial CT section with brain algorithm.  FINDINGS:  CT PERFUSION: CT perfusion study consists of a 10 cm slab of brain imaging from the mid cerebellar hemispheres through the cerebral hemispheres and demonstrates no areas of delayed time to maximal enhancement of greater than 6 seconds. Thus, no convincing hypoperfused areas and there are no areas of reduced cerebral blood flow to less than 30% with no areas of acute completed infarction in the 10 cm field of brain imaging.  CT ANGIOGRAM NECK: The nasopharynx, oropharynx, hypopharynx, true cords and subglottic airway are normal in appearance. 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 The right lobe of the thyroid is unremarkable. The lung apices are clear. The parotid glands are atrophic and there is mild atrophy of this submandibular glands. The  and parapharyngeal spaces are normal in appearance. No pathologic lymphadenopathy is seen in the neck. There is some minimal cervical spondylosis, minimal disc space narrowing and degenerative endplate changes at C4-5 and C5-6. There is  uncovertebral joint spurring with mild left foraminal narrowing at C5-6 and C6-7. There is anatomic origin of great vessels off the aortic arch. The left subclavian artery origin is normal in appearance and no stenosis is seen in the left subclavian artery. There is focal mild-to-moderate stenosis at the left vertebral artery origin. Beyond its origin the left vertebral artery is widely patent without stenosis to the vertebrobasilar junction. The left common carotid origin is normal in appearance and no stenosis is seen in the left common carotid artery. Portions of the mid and left common carotid artery are obscured by motion artifact. The distal left common carotid artery and left common carotid bifurcation are normal in appearance. No stenosis is seen in the left internal carotid artery using the NASCET criteria. Brachycephalic artery origin is normal in appearance and no stenosis is seen in the brachycephalic artery. Its bifurcation into the right subclavian and common carotid artery is normal in appearance. No stenosis is seen in the right subclavian artery. The right vertebral artery origin is normal in appearance and no stenosis is seen in the right vertebral artery from its origin to the vertebrobasilar junction. The right common carotid origin is normal in appearance. Portions of the mid right common carotid artery are obscured by motion artifact. The distal right common carotid artery and right common carotid bifurcation is normal in appearance and no stenosis is seen in the right internal carotid artery using the NASCET criteria.  CT ANGIOGRAM OF THE HEAD: CT angiographic images of the head demonstrate a normal appearance to the intracranial segments of the distal vertebral arteries as well as the basilar artery and basilar tip as well as the posterior cerebral and superior cerebellar arteries bilaterally. The upper cervical, petrous, cavernous and supracavernous segments of the internal carotid arteries  are normal in appearance. The anterior and middle cerebral arteries are normal in appearance. The anterior communicating artery origin and middle cerebral artery trifurcations are normal in appearance.  IMPRESSION:  1. CT images of the head demonstrate complete opacification of the left maxillary sinus. There is circumferential mucosal thickening and hyperdense central chronic inspissated secretions. Otherwise the CT of the head is normal. The results of the noncontrast head CT were communicated to Dr. Smith from Stroke Neurology 03/13/2020 at 12:43 PM.  2. CT perfusion study demonstrates no areas of hypoperfusion and no areas of acute completed infarction in the 10 cm slab of brain imaging from the mid cerebellar hemispheres through the cerebral hemispheres.  3. There is a focal mild to up to mild-to-moderate stenosis of the left vertebral artery origin; otherwise, the remainder of the CT angiogram of the head and neck is within normal limits with no stenosis seen in the great vessels of the head or neck.  The final dictated results were communicated to Dr. Smith from Stroke Neurology and Dr. Galeas by telephone on 03/13/2020 at 1:00 PM.   Radiation dose reduction techniques were utilized, including automated exposure control and exposure modulation based on body size.       Xr Knee 3 View Bilateral    Ordering physician's impression is located in the Encounter Note dated 02/27/20. X-ray performed in the DR room.     Xr Chest 1 View    Result Date: 3/13/2020  XR CHEST 1 VW-  HISTORY: Male who is 79 years-old,  stroke  TECHNIQUE: Frontal view of the chest  COMPARISON: 03/08/2017  FINDINGS: The heart size is borderline. Aorta is tortuous. Pulmonary vasculature is unremarkable. No focal pulmonary consolidation, pleural effusion, or pneumothorax. No acute osseous process.      No evidence for acute pulmonary process. Borderline heart size. Tortuous aorta. Follow-up as clinically indicated.  This report was  finalized on 3/13/2020 1:36 PM by Dr. Checo Flores M.D.      Ct Cerebral Perfusion With & Without Contrast    Result Date: 3/13/2020  EMERGENCY CONTRAST-ENHANCED CT ANGIOGRAM OF THE HEAD AND NECK WITH CT PERFUSION STUDY OF THE HEAD 03/13/2020  CLINICAL HISTORY: Acute onset dizziness, vomiting, possible stroke.  NONCONTRAST HEAD CT  TECHNIQUE: Spiral CT images were obtained from the base of the skull to the vertex without intravenous contrast and images were reformatted and submitted in 3 mm thick axial CT section with brain algorithm. 2 mm thick sagittal and coronal reconstructions were performed and submitted in brain algorithm.  This is correlated to prior noncontrast head CT from [facility] 01/09/2013.  FINDINGS: The brain parenchyma is normal in attenuation. The ventricles are normal in size. I see no mass effect and no midline shift and no extra-axial fluid collections are identified. There is no evidence of acute intracranial hemorrhage. There is complete opacification of the left maxillary sinus with circumferential mucosal thickening in the central, chronic inspissated secretions The remainder of the paranasal sinuses and mastoid air cells and middle ear cavities are clear. The calvarium and skull base and orbits are unremarkable.      Complete opacification of the left maxillary sinus with circumferential mucosal thickening and hyperdense central chronic inspissated secretions. The remainder of the head CT is normal.  The results were communicated to Dr. Smith, the Stroke Neurologist while the patient was still under the scanner 03/13/2020 at 12:44 PM and he requested a CT angiogram of the head and neck and CT perfusion study of the head for further evaluation.  CT ANGIOGRAM OF THE HEAD AND NECK AND CT PERFUSION STUDY  TECHNIQUE: Spiral CT images were obtained through the head during the arterial phase of contrast and images were reformatted and analyzed with rapid software analysis  CT perfusion study of the head and subsequently spiral CT images were obtained from the top of the aortic arch up through the great vessels of the head and neck during arterial phase of contrast. Images were reformatted and submitted in 1 mm thick axial, sagittal and coronal CT sections. Additional 3D reconstructions were performed to complete the CT angiogram of the head and neck and finally spiral CT images were obtained from the base of skull to vertex delayed following intravenous contrast and images were reformatted and submitted in 3 mm thick axial CT section with brain algorithm.  FINDINGS:  CT PERFUSION: CT perfusion study consists of a 10 cm slab of brain imaging from the mid cerebellar hemispheres through the cerebral hemispheres and demonstrates no areas of delayed time to maximal enhancement of greater than 6 seconds. Thus, no convincing hypoperfused areas and there are no areas of reduced cerebral blood flow to less than 30% with no areas of acute completed infarction in the 10 cm field of brain imaging.  CT ANGIOGRAM NECK: The nasopharynx, oropharynx, hypopharynx, true cords and subglottic airway are normal in appearance. 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 The right lobe of the thyroid is unremarkable. The lung apices are clear. The parotid glands are atrophic and there is mild atrophy of this submandibular glands. The  and parapharyngeal spaces are normal in appearance. No pathologic lymphadenopathy is seen in the neck. There is some minimal cervical spondylosis, minimal disc space narrowing and degenerative endplate changes at C4-5 and C5-6. There is uncovertebral joint spurring with mild left foraminal narrowing at C5-6 and C6-7. There is anatomic origin of great vessels off the aortic arch. The left subclavian artery origin is normal in appearance and no stenosis is seen in the left subclavian artery.  There is focal mild-to-moderate stenosis at the left vertebral artery origin. Beyond its origin the left vertebral artery is widely patent without stenosis to the vertebrobasilar junction. The left common carotid origin is normal in appearance and no stenosis is seen in the left common carotid artery. Portions of the mid and left common carotid artery are obscured by motion artifact. The distal left common carotid artery and left common carotid bifurcation are normal in appearance. No stenosis is seen in the left internal carotid artery using the NASCET criteria. Brachycephalic artery origin is normal in appearance and no stenosis is seen in the brachycephalic artery. Its bifurcation into the right subclavian and common carotid artery is normal in appearance. No stenosis is seen in the right subclavian artery. The right vertebral artery origin is normal in appearance and no stenosis is seen in the right vertebral artery from its origin to the vertebrobasilar junction. The right common carotid origin is normal in appearance. Portions of the mid right common carotid artery are obscured by motion artifact. The distal right common carotid artery and right common carotid bifurcation is normal in appearance and no stenosis is seen in the right internal carotid artery using the NASCET criteria.  CT ANGIOGRAM OF THE HEAD: CT angiographic images of the head demonstrate a normal appearance to the intracranial segments of the distal vertebral arteries as well as the basilar artery and basilar tip as well as the posterior cerebral and superior cerebellar arteries bilaterally. The upper cervical, petrous, cavernous and supracavernous segments of the internal carotid arteries are normal in appearance. The anterior and middle cerebral arteries are normal in appearance. The anterior communicating artery origin and middle cerebral artery trifurcations are normal in appearance.  IMPRESSION:  1. CT images of the head demonstrate  complete opacification of the left maxillary sinus. There is circumferential mucosal thickening and hyperdense central chronic inspissated secretions. Otherwise the CT of the head is normal. The results of the noncontrast head CT were communicated to Dr. Smith from [facility] 03/13/2020 at 12:43 PM.  2. CT perfusion study demonstrates no areas of hypoperfusion and no areas of acute completed infarction in the 10 cm slab of brain imaging from the mid cerebellar hemispheres through the cerebral hemispheres.  3. There is a focal mild to up to mild-to-moderate stenosis of the left vertebral artery origin; otherwise, the remainder of the CT angiogram of the head and neck is within normal limits with no stenosis seen in the great vessels of the head or neck.  The final dictated results were communicated to Dr. Smith from [facility] and Dr. Galeas by telephone on 03/13/2020 at 1:00 PM.   Radiation dose reduction techniques were utilized, including automated exposure control and exposure modulation based on body size.           During this visit the following were done:  Labs Reviewed [x]    Labs Ordered []    Radiology Reports Reviewed [x]    Radiology Ordered [x]    EKG, echo, and/or stress test reviewed []    EEG results reviewed  []    EEG reviewed and interpreted per myself   []    Discussed case with neurointerventionalist or neuroradiologist [x]    Referring Provider Records Reviewed []    ER Records Reviewed []    Hospital Records Reviewed [x]    History Obtained From Family []    Radiological images view and Interpreted per myself [x]    Case Discussed with referring provider []     Decision to obtain and request outside records  []        Assessment and Plan     Acute vertigo, no evidence of focal deficits or CT/CTA/CTP abnormality to support suspicion of a stroke. Favor vestibular neuritis. No TPA recommended.   - Observation on telemetry.   - Symptomatic management with Antivert, Zofran,  Ativan prn.   - Decadron 10 mg IV once.   -  mg po.   - MRI brain w/wo.     Thanks,      Electronically signed by Thomas Smith MD on 3/13/2020 at 13:20

## 2020-03-14 VITALS
HEIGHT: 69 IN | RESPIRATION RATE: 18 BRPM | BODY MASS INDEX: 32.94 KG/M2 | HEART RATE: 83 BPM | DIASTOLIC BLOOD PRESSURE: 78 MMHG | SYSTOLIC BLOOD PRESSURE: 140 MMHG | TEMPERATURE: 97.6 F | WEIGHT: 222.4 LBS | OXYGEN SATURATION: 94 %

## 2020-03-14 LAB
ANION GAP SERPL CALCULATED.3IONS-SCNC: 11.7 MMOL/L (ref 5–15)
BUN BLD-MCNC: 17 MG/DL (ref 8–23)
BUN/CREAT SERPL: 16 (ref 7–25)
CALCIUM SPEC-SCNC: 8.5 MG/DL (ref 8.6–10.5)
CHLORIDE SERPL-SCNC: 103 MMOL/L (ref 98–107)
CO2 SERPL-SCNC: 21.3 MMOL/L (ref 22–29)
CREAT BLD-MCNC: 1.06 MG/DL (ref 0.76–1.27)
DEPRECATED RDW RBC AUTO: 43.2 FL (ref 37–54)
ERYTHROCYTE [DISTWIDTH] IN BLOOD BY AUTOMATED COUNT: 12.9 % (ref 12.3–15.4)
GFR SERPL CREATININE-BSD FRML MDRD: 68 ML/MIN/1.73
GLUCOSE BLD-MCNC: 130 MG/DL (ref 65–99)
HCT VFR BLD AUTO: 46.4 % (ref 37.5–51)
HGB BLD-MCNC: 15.5 G/DL (ref 13–17.7)
MCH RBC QN AUTO: 30.6 PG (ref 26.6–33)
MCHC RBC AUTO-ENTMCNC: 33.4 G/DL (ref 31.5–35.7)
MCV RBC AUTO: 91.5 FL (ref 79–97)
PLATELET # BLD AUTO: 240 10*3/MM3 (ref 140–450)
PMV BLD AUTO: 10 FL (ref 6–12)
POTASSIUM BLD-SCNC: 4.5 MMOL/L (ref 3.5–5.2)
RBC # BLD AUTO: 5.07 10*6/MM3 (ref 4.14–5.8)
SODIUM BLD-SCNC: 136 MMOL/L (ref 136–145)
TROPONIN T SERPL-MCNC: <0.01 NG/ML (ref 0–0.03)
WBC NRBC COR # BLD: 16.42 10*3/MM3 (ref 3.4–10.8)

## 2020-03-14 PROCEDURE — 99213 OFFICE O/P EST LOW 20 MIN: CPT | Performed by: NURSE PRACTITIONER

## 2020-03-14 PROCEDURE — G0378 HOSPITAL OBSERVATION PER HR: HCPCS

## 2020-03-14 PROCEDURE — 97162 PT EVAL MOD COMPLEX 30 MIN: CPT

## 2020-03-14 PROCEDURE — 80048 BASIC METABOLIC PNL TOTAL CA: CPT | Performed by: NURSE PRACTITIONER

## 2020-03-14 PROCEDURE — 84484 ASSAY OF TROPONIN QUANT: CPT | Performed by: NURSE PRACTITIONER

## 2020-03-14 PROCEDURE — 85027 COMPLETE CBC AUTOMATED: CPT | Performed by: NURSE PRACTITIONER

## 2020-03-14 PROCEDURE — 36415 COLL VENOUS BLD VENIPUNCTURE: CPT | Performed by: NURSE PRACTITIONER

## 2020-03-14 PROCEDURE — 97110 THERAPEUTIC EXERCISES: CPT

## 2020-03-14 RX ORDER — MECLIZINE HYDROCHLORIDE 25 MG/1
25 TABLET ORAL 3 TIMES DAILY PRN
Qty: 30 TABLET | Refills: 0 | Status: SHIPPED | OUTPATIENT
Start: 2020-03-14

## 2020-03-14 RX ORDER — GUAIFENESIN 600 MG/1
600 TABLET, EXTENDED RELEASE ORAL EVERY 12 HOURS SCHEDULED
Start: 2020-03-14 | End: 2021-03-25

## 2020-03-14 RX ADMIN — SODIUM CHLORIDE, PRESERVATIVE FREE 10 ML: 5 INJECTION INTRAVENOUS at 08:01

## 2020-03-14 RX ADMIN — MECLIZINE HYDROCHLORIDE 25 MG: 25 TABLET ORAL at 04:23

## 2020-03-14 RX ADMIN — FLUTICASONE PROPIONATE 2 SPRAY: 50 SPRAY, METERED NASAL at 08:01

## 2020-03-14 RX ADMIN — MECLIZINE HYDROCHLORIDE 25 MG: 25 TABLET ORAL at 14:14

## 2020-03-14 RX ADMIN — GUAIFENESIN 600 MG: 600 TABLET, EXTENDED RELEASE ORAL at 08:01

## 2020-03-14 RX ADMIN — ACETAMINOPHEN 650 MG: 325 TABLET, FILM COATED ORAL at 08:01

## 2020-03-14 NOTE — PROGRESS NOTES
OD impacted; Ear lavage performed; successfully removed large intact ball of wax. Patient reported instant improvement in hearing and vertigo.     Aliya Galan APRN     Ambulatory

## 2020-03-14 NOTE — THERAPY DISCHARGE NOTE
Patient Name: Asa Magaña  : 1940    MRN: [id number]                              Today's Date: 3/14/2020       Admit Date: 3/13/2020    Visit Dx:     ICD-10-CM ICD-9-CM   1. Vertigo R42 780.4   2. Intractable vomiting with nausea, unspecified vomiting type R11.2 536.2     Patient Active Problem List   Diagnosis   • Asthma   • Hyperlipidemia   • Lumbago   • Hypothyroidism   • Hx of migraine headaches   • BPH (benign prostatic hyperplasia)   • Pyelonephritis   • CAP (community acquired pneumonia)   • Sepsis (CMS/HCC)   • Rectal bleeding   • Primary osteoarthritis of left knee   • Vertigo   • Chronic maxillary sinusitis   • Chest pain, atypical     Past Medical History:   Diagnosis Date   • Asthma    • BPH (benign prostatic hyperplasia)    • History of kidney stones    • History of sepsis 2017    BLADDER INFECTION AND PNEUMONIA BOTH TWICE   • Hx of migraine headaches    • Hyperlipidemia    • Hypothyroidism    • Knee pain    • Skin abrasion     LEFT FOREARM THAT HAS NOW HEALED     Past Surgical History:   Procedure Laterality Date   • COLONOSCOPY  2016    benign polyp-repeat 5 years- Filemon Beasley M.D.   • CYSTOSCOPY TRANSURETHRAL RESECTION OF PROSTATE     • CYSTOSCOPY W/ URETERAL STENT PLACEMENT Right 2017    Procedure: CYSTOSCOPY, ureteroscopy,  URETERAL STENT INSERTION, RT;  Surgeon: Shashi Arciniega MD;  Location: [facility] OR;  Service:    • HERNIA REPAIR     • KNEE ARTHROPLASTY UNICOMPARTMENTAL Left 2018    Procedure: KNEE ARTHROPLASTY UNICOMPARTMENTAL;  Surgeon: Davin Beasley MD;  Location: [facility];  Service: Orthopedics   • KNEE SURGERY      KNEE ARTHROSCOPY X3   • THYROIDECTOMY       General Information     Row Name 20 1452          PT Evaluation Time/Intention    Document Type  evaluation;discharge evaluation/summary  -MW     Mode of Treatment  physical therapy  -MW     Row Name 20 1452          General Information    Patient Profile Reviewed?  yes  -MW      Prior Level of Function  independent:;all household mobility;gait  -MW     Existing Precautions/Restrictions  fall  -MW     Barriers to Rehab  none identified  -MW     Row Name 03/14/20 1452          Home Main Entrance    Number of Stairs, Main Entrance  three  -MW     Stair Railings, Main Entrance  railings on both sides of stairs  -     Row Name 03/14/20 1452          Cognitive Assessment/Intervention- PT/OT    Orientation Status (Cognition)  oriented x 4  -MW     Row Name 03/14/20 1452          Safety Issues, Functional Mobility    Safety Issues Affecting Function (Mobility)  ability to follow commands;awareness of need for assistance  -MW     Impairments Affecting Function (Mobility)  balance  -MW     Comment, Safety Issues/Impairments (Mobility)  Gait belt and nonslip socks used for safety.  -MW       User Key  (r) = Recorded By, (t) = Taken By, (c) = Cosigned By    Initials Name Provider Type    MW Lorena Lee, PT Physical Therapist        Mobility     Row Name 03/14/20 1454          Bed Mobility Assessment/Treatment    Bed Mobility Assessment/Treatment  sit-supine  -MW     Sit-Supine [place] (Bed Mobility)  conditional independence  -     Comment (Bed Mobility)  Was standing on his own at start of session  -     Row Name 03/14/20 1454          Sit-Stand Transfer    Sit-Stand [place] (Transfers)  conditional independence  -MW     Row Name 03/14/20 1454          Gait/Stairs Assessment/Training    Gait/Stairs Assessment/Training  gait/ambulation independence  -MW     [place] Level (Gait)  contact guard  -MW     Distance in Feet (Gait)  200  -MW     Pattern (Gait)  step-through  -MW     Deviations/Abnormal Patterns (Gait)  base of support, narrow  -MW     Negotiation (Stairs)  stairs independence  -MW     [place] Level (Stairs)  contact guard;supervision  -MW     Handrail Location (Stairs)  left side (ascending)  -MW     Number of Steps (Stairs)  8  -MW     Ascending Technique  (Stairs)  step-over-step  -MW     Descending Technique (Stairs)  step-over-step  -MW     Comment (Gait/Stairs)  Mild unsteadiness and guarded, initially reaching for support but no LOB and improved as he ambulated further. Safe with stairs with CG/Sup A.  -       User Key  (r) = Recorded By, (t) = Taken By, (c) = Cosigned By    Initials Name Provider Type    Lorena Rivera PT Physical Therapist        Obj/Interventions     Row Name 03/14/20 1457          General ROM    GENERAL ROM COMMENTS  Grossly WFL   -MW     [facility] Name 03/14/20 1457          MMT (Manual Muscle Testing)    General MMT Comments  Grossly WFL  -MW     [facility] Name 03/14/20 1457          Static Sitting Balance    Level of [place] (Unsupported Sitting, Static Balance)  conditional independence  -MW     Sitting Position (Unsupported Sitting, Static Balance)  sitting on edge of bed  -[facility] Name 03/14/20 1457          Dynamic Sitting Balance    Level of [place], Reaches Outside Midline (Sitting, Dynamic Balance)  conditional independence  -MW     Sitting Position, Reaches Outside Midline (Sitting, Dynamic Balance)  sitting on edge of bed  -[facility] Name 03/14/20 1457          Static Standing Balance    Level of [place] (Supported Standing, Static Balance)  supervision  -[facility] Name 03/14/20 1457          Dynamic Standing Balance    Level of [place], Reaches Outside Midline (Standing, Dynamic Balance)  supervision;contact guard assist  -[facility] Name 03/14/20 1457          Sensory Assessment/Intervention    Sensory General Assessment  no sensation deficits identified  -       User Key  (r) = Recorded By, (t) = Taken By, (c) = Cosigned By    Initials Name Provider Type    Lorena Rivera PT Physical Therapist        Goals/Plan    No documentation.       Clinical Impression     Row Name 03/14/20 1457          Pain Assessment    Additional Documentation  Pain Scale: Numbers Pre/Post-Treatment (Group)  -     Row Name  03/14/20 1457          Pain Scale: Numbers Pre/Post-Treatment    Pain Scale: Numbers, Pretreatment  0/10 - no pain  -MW     Pain Scale: Numbers, Post-Treatment  0/10 - no pain  -MW     Row Name 03/14/20 1457          Plan of Care Review    Plan of Care Reviewed With  patient  -MW     Outcome Summary  Pt is a 80 yo male admitted with dizziness and unsteady gait. Reports much improved symptoms since ear lavage performed. He is independent with all mobility at baseline without AD, lives with spouse in a home with 3 OLIVIA. Today he was found standing on his own next to his bed at start of PT session. He was able to ambulate with mild unsteadiness that improved with further ambulation with CGA. He negotiated stairs safely as well. No c/o vertigo during PT eval. At this time PT recommends home with A after discharge from hospital. No further acute PT indicated at this time, will sign off.   -MW     Row Name 03/14/20 1457          Physical Therapy Clinical Impression    Patient/Family Goals Statement (PT Clinical Impression)  Agreeable to PT Eval.  -MW     Criteria for Skilled Interventions Met (PT Clinical Impression)  other (see comments) Acute PT x 1 session for pt education and safety with mobility.  -MW     Row Name 03/14/20 1457          Vital Signs    Pre Systolic BP Rehab  119  -MW     Pre Treatment Diastolic BP  67  -MW     Pretreatment Heart Rate (beats/min)  89  -MW     O2 Delivery Pre Treatment  room air  -MW     O2 Delivery Intra Treatment  room air  -MW     O2 Delivery Post Treatment  room air  -MW     Pre Patient Position  Standing  -MW     Intra Patient Position  Standing  -MW     Post Patient Position  Supine  -MW     Row Name 03/14/20 1457          Positioning and Restraints    Pre-Treatment Position  standing in room  -MW     Post Treatment Position  bed  -MW     In Bed  notified nsg;supine;call light within reach;encouraged to call for assist  -MW       User Key  (r) = Recorded By, (t) = Taken By, (c) =  Cosigned By    Initials Name Provider Type    Lorena Rivera, PT Physical Therapist        Outcome Measures     Row Name 03/14/20 1504          How much help from another person do you currently need...    Turning from your back to your side while in flat bed without using bedrails?  4  -MW     Moving from lying on back to sitting on the side of a flat bed without bedrails?  4  -MW     Moving to and from a bed to a chair (including a wheelchair)?  4  -MW     Standing up from a chair using your arms (e.g., wheelchair, bedside chair)?  4  -MW     Climbing 3-5 steps with a railing?  4  -MW     To walk in hospital room?  4  -MW     AM-PAC 6 Clicks Score (PT)  24  -MW     Row Name 03/14/20 1507          Modified [place] Scale    Modified [place] Scale  1 - No significant disability despite symptoms.  Able to carry out all usual duties and activities.  -     Row Name 03/14/20 1507 03/14/20 1504       Functional Assessment    Outcome Measure Options  Modified [place]  -MW  AM-PAC 6 Clicks Basic Mobility (PT)  -      User Key  (r) = Recorded By, (t) = Taken By, (c) = Cosigned By    Initials Name Provider Type    Lorena Rivera, ANA MARÍA Physical Therapist          PT Recommendation and Plan     Outcome Summary/Treatment Plan (PT)  Anticipated Discharge Disposition (PT): home with assist  Plan of Care Reviewed With: patient  Outcome Summary: Pt is a 80 yo male admitted with dizziness and unsteady gait. Reports much improved symptoms since ear lavage performed. He is independent with all mobility at baseline without AD, lives with spouse in a home with 3 OLIVIA. Today he was found standing on his own next to his bed at start of PT session. He was able to ambulate with mild unsteadiness that improved with further ambulation with CGA. He negotiated stairs safely as well. No c/o vertigo during PT eval. At this time PT recommends home with A after discharge from hospital. No further acute PT indicated at this time, will sign off.       Time Calculation:   PT Charges     Row Name 03/14/20 1507             Time Calculation    Start Time  1426  -MW      Stop Time  1436  -MW      Time Calculation (min)  10 min  -MW      PT Received On  03/14/20  -MW         Time Calculation- PT    Total Timed Code Minutes- PT  8 minute(s)  -MW        User Key  (r) = Recorded By, (t) = Taken By, (c) = Cosigned By    Initials Name Provider Type    Lorena Rivera, PT Physical Therapist        Therapy Charges for Today     Code Description Service Date Service Provider Modifiers Qty    61848182438 HC PT EVAL MOD COMPLEXITY 2 3/14/2020 Lorena Lee, PT GP 1    98542318833 HC PT THER PROC EA 15 MIN 3/14/2020 Lorena Lee, PT GP 1          PT G-Codes  Outcome Measure Options: Modified [place]  AM-PAC 6 Clicks Score (PT): 24  Modified [place] Scale: 1 - No significant disability despite symptoms.  Able to carry out all usual duties and activities.    PT Discharge Summary  Anticipated Discharge Disposition (PT): home with assist    Lorena Lee PT  3/14/2020

## 2020-03-14 NOTE — PLAN OF CARE
Problem: Fall Risk (Adult)  Goal: Identify Related Risk Factors and Signs and Symptoms  Outcome: Outcome(s) achieved  Goal: Absence of Fall  Outcome: Outcome(s) achieved     Problem: Patient Care Overview  Goal: Plan of Care Review  Outcome: Outcome(s) achieved  Flowsheets  Taken 3/14/2020 1524 by Karen Keith RN  Progress: improving  Outcome Summary: Discharged home with outpt PT. Right ear lavage resulted in large ball of earwax removed. No c/o dizziness afterwards. N/V resolved.  Taken 3/14/2020 1457 by Lorena Lee PT  Plan of Care Reviewed With: patient  Goal: Individualization and Mutuality  Outcome: Outcome(s) achieved  Goal: Discharge Needs Assessment  Outcome: Outcome(s) achieved  Goal: Interprofessional Rounds/Family Conf  Outcome: Outcome(s) achieved     Problem: Stroke (Ischemic) (Adult)  Goal: Signs and Symptoms of Listed Potential Problems Will be Absent, Minimized or Managed (Stroke)  Outcome: Outcome(s) achieved     Problem: Nausea/Vomiting (Adult)  Goal: Identify Related Risk Factors and Signs and Symptoms  Outcome: Outcome(s) achieved  Goal: Symptom Relief  Outcome: Outcome(s) achieved  Goal: Adequate Hydration  Outcome: Outcome(s) achieved     Problem: Pain, Chronic (Adult)  Goal: Identify Related Risk Factors and Signs and Symptoms  Outcome: Outcome(s) achieved  Goal: Acceptable Pain/Comfort Level and Functional Ability  Outcome: Outcome(s) achieved

## 2020-03-14 NOTE — PLAN OF CARE
Problem: Patient Care Overview  Goal: Plan of Care Review  Outcome: Ongoing (interventions implemented as appropriate)  Flowsheets (Taken 3/14/2020 8536)  Plan of Care Reviewed With: patient  Outcome Summary: Pt is a 80 yo male admitted with dizziness and unsteady gait. Reports much improved symptoms since ear lavage performed. He is independent with all mobility at baseline without AD, lives with spouse in a home with 3 OLIVIA. Today he was found standing on his own next to his bed at start of PT session. He was able to ambulate with mild unsteadiness that improved with further ambulation with CGA. He negotiated stairs safely as well. No c/o vertigo during PT eval. At this time PT recommends home with A after discharge from hospital. No further acute PT indicated at this time, will sign off.

## 2020-03-14 NOTE — PLAN OF CARE
Problem: Patient Care Overview  Goal: Plan of Care Review  Outcome: Ongoing (interventions implemented as appropriate)  Flowsheets (Taken 3/14/2020 9020)  Progress: improving  Plan of Care Reviewed With: patient  Outcome Summary: VSS. PRN Meclizine given once overnight. Dizziness has improved. No c/o nausea overnight. Will continue to monitor.     Problem: Fall Risk (Adult)  Goal: Identify Related Risk Factors and Signs and Symptoms  Outcome: Ongoing (interventions implemented as appropriate)     Problem: Stroke (Ischemic) (Adult)  Goal: Signs and Symptoms of Listed Potential Problems Will be Absent, Minimized or Managed (Stroke)  Outcome: Ongoing (interventions implemented as appropriate)     Problem: Nausea/Vomiting (Adult)  Goal: Identify Related Risk Factors and Signs and Symptoms  Outcome: Ongoing (interventions implemented as appropriate)     Problem: Pain, Chronic (Adult)  Goal: Identify Related Risk Factors and Signs and Symptoms  Outcome: Ongoing (interventions implemented as appropriate)     Problem: Nausea/Vomiting (Adult)  Goal: Identify Related Risk Factors and Signs and Symptoms  Outcome: Ongoing (interventions implemented as appropriate)

## 2020-03-14 NOTE — DISCHARGE SUMMARY
Patient Name: Asa Magaña  : 1940  MRN: [id number]    Date of Admission: 3/13/2020  Date of Discharge:  3/14/2020  Primary Care Physician: Michele Rivero MD      Chief Complaint:   Dizziness      Discharge Diagnoses     Active Hospital Problems    Diagnosis  POA   • **Vertigo [R42]  Yes   • Chronic maxillary sinusitis [J32.0]  Yes   • Chest pain, atypical [R07.89]  Yes   • Hx of migraine headaches [Z86.69]  Not Applicable   • Hypothyroidism [E03.9]  Yes      Resolved Hospital Problems   No resolved problems to display.        Hospital Course     Mr. Magaña is a 79 y.o. male with a history of migraines who presented to [facility] initially complaining of dizziness.  Please see the admitting H&P for further details.  CT of the head normal; negative for acute stroke.  CTA showed mild to moderate stenosis in the left vertebral artery origin otherwise unremarkable.  CT perfusion showed no perfusion mismatch.  MRI of the brain showed complete opacification of the left maxillary sinus with mucosal thickening.  No evidence of infarct seen on this study.TSH 2.890, LDL 77.  He was seen by neurology who suspected acute vertigo likely secondary to vestibular neuritis.  Extensive neuro imaging all negative.  Neurology recommended Antivert for dizziness and outpatient physical therapy.  They did retrieve impacted cerumen from his ear canal.  He has evidence of maxillary sinusitis and recommended he follow-up with ENT and his PCP.  Patient feels much better today and is agreeable to discharge.      Day of Discharge     Feels much better.  Still little off balance with ambulation.    Physical Exam:  Temp:  [97.4 °F (36.3 °C)-97.8 °F (36.6 °C)] 97.6 °F (36.4 °C)  Heart Rate:  [59-83] 83  Resp:  [16-18] 18  BP: (119-189)/(66-92) 140/78  Body mass index is 32.84 kg/m².  Physical Exam   Constitutional: He is oriented to person, place, and time. No distress.   Cardiovascular: Normal rate and  regular rhythm.   No murmur heard.  Pulmonary/Chest: Effort normal and breath sounds normal.   Abdominal: Soft. Bowel sounds are normal. He exhibits no distension. There is no tenderness.   Musculoskeletal: Normal range of motion. He exhibits no edema.   Neurological: He is alert and oriented to person, place, and time.   Skin: Skin is warm and dry. He is not diaphoretic.       Consultants     Consult Orders (all) (From admission, onward)     Start     Ordered    03/13/20 1425  Inpatient Neurology Consult General  Once     Specialty:  Neurology  Provider:  Thomas Smith MD    03/13/20 1428              Procedures     Imaging Results (All)     Procedure Component Value Units Date/Time    MRI Brain With & Without Contrast [[id number]] Collected:  03/13/20 1615     Updated:  03/13/20 1648    Narrative:       STAT MRI OF THE BRAIN WITH AND WITHOUT CONTRAST 03/13/2020     CLINICAL HISTORY: Acute onset of vertigo, dizziness, nausea and  vomiting, evaluate for acute stroke.      TECHNIQUE: Axial T1, FLAIR, fat-suppressed T2, axial diffusion and  gradient echo T2, sagittal T1 and postcontrast axial fat-suppressed T1  and coronal T1-weighted images were obtained of the entire head.      Study is correlated to a contrast-enhanced CT angiogram of the head and  neck and CT perfusion study of the head earlier today 03/13/2020 at  12:40 PM.     FINDINGS: The brain parenchyma is normal in signal intensity.  Specifically no diffusion-weighted abnormality is seen with no acute  infarct identified. On the gradient echo T2 weighted images no acute or  old blood breakdown products are seen intracranially. The ventricles are  normal in size. I see no focal mass effect. There is no midline shift.  No extra-axial fluid collections are identified. No abnormal areas of  enhancement are seen in the head. There is complete opacification of the  left maxillary sinus with circumferential mucosal thickening of the  central contents that  are T1 hyperintense, T2 hypointense and on CT  today were hyperdense and felt to be hyperdense chronic inspissated  secretions or hemorrhagic or fungal contents.  The remainder of the  paranasal sinuses, mastoid air cells and middle ear cavities are clear.  Good flow voids are demonstrated within the cerebral vessels and in the  dural venous sinuses.  The calvarium and skull base demonstrate normal  marrow signal intensity.  The orbits are unremarkable.       Impression:       1. There is complete opacification of the left maxillary sinus with  circumferential mucosal thickening of the central contents that are T1  hyperintense, T2 hypointense indicating chronic inspissated secretions,  less likely hemorrhagic or fungal contents.  2. The remainder of the MRI of the head is within normal limits.  Specifically no acute infarct is identified and the etiology of the  acute onset vertigo and dizziness is not established on this exam. The  results were communicated to Kenny Smith MD, by telephone 03/13/2020  at 3:25 PM.      This report was finalized on 3/13/2020 4:45 PM by Dr. Jamar Sears M.D.       CT Angiogram Head [[id number]] Collected:  03/13/20 1400     Updated:  03/13/20 1618    Narrative:       EMERGENCY CONTRAST-ENHANCED CT ANGIOGRAM OF THE HEAD AND NECK WITH CT  PERFUSION STUDY OF THE HEAD 03/13/2020     CLINICAL HISTORY: Acute onset dizziness, vomiting, possible stroke.     NONCONTRAST HEAD CT      TECHNIQUE: Spiral CT images were obtained from the base of the skull to  the vertex without intravenous contrast and images were reformatted and  submitted in 3 mm thick axial CT section with brain algorithm. 2 mm  thick sagittal and coronal reconstructions were performed and submitted  in brain algorithm.     This is correlated to prior noncontrast head CT from [facility] 01/09/2013.     FINDINGS: The brain parenchyma is normal in attenuation. The ventricles  are normal in size. I see no mass  effect and no midline shift and no  extra-axial fluid collections are identified. There is no evidence of  acute intracranial hemorrhage. There is complete opacification of the  left maxillary sinus with circumferential mucosal thickening in the  central, chronic inspissated secretions The remainder of the paranasal  sinuses and mastoid air cells and middle ear cavities are clear. The  calvarium and skull base and orbits are unremarkable.       Impression:       Complete opacification of the left maxillary sinus with  circumferential mucosal thickening and hyperdense central chronic  inspissated secretions. The remainder of the head CT is normal.      The results were communicated to Dr. Smith, the Stroke Neurologist  while the patient was still under the scanner 03/13/2020 at 12:44 PM and  he requested a CT angiogram of the head and neck and CT perfusion study  of the head for further evaluation.      CT ANGIOGRAM OF THE HEAD AND NECK AND CT PERFUSION STUDY      TECHNIQUE: Spiral CT images were obtained through the head during the  arterial phase of contrast and images were reformatted and analyzed with  rapid software analysis CT perfusion study of the head and subsequently  spiral CT images were obtained from the top of the aortic arch up  through the great vessels of the head and neck during arterial phase of  contrast. Images were reformatted and submitted in 1 mm thick axial,  sagittal and coronal CT sections. Additional 3D reconstructions were  performed to complete the CT angiogram of the head and neck and finally  spiral CT images were obtained from the base of skull to vertex delayed  following intravenous contrast and images were reformatted and submitted  in 3 mm thick axial CT section with brain algorithm.     FINDINGS:      CT PERFUSION: CT perfusion study consists of a 10 cm slab of brain  imaging from the mid cerebellar hemispheres through the cerebral  hemispheres and demonstrates no areas of  delayed time to maximal  enhancement of greater than 6 seconds. Thus, no convincing hypoperfused  areas and there are no areas of reduced cerebral blood flow to less than  30% with no areas of acute completed infarction in the 10 cm field of  brain imaging.     CT ANGIOGRAM NECK: The nasopharynx, oropharynx, hypopharynx, true cords  and subglottic airway are normal in appearance. 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 The right lobe of the thyroid is unremarkable.  The lung apices are clear. The parotid glands are atrophic and there is  mild atrophy of this submandibular glands. The  and  parapharyngeal spaces are normal in appearance. No pathologic  lymphadenopathy is seen in the neck. There is some minimal cervical  spondylosis, minimal disc space narrowing and degenerative endplate  changes at C4-5 and C5-6. There is uncovertebral joint spurring with  mild left foraminal narrowing at C5-6 and C6-7. There is anatomic origin  of great vessels off the aortic arch. The left subclavian artery origin  is normal in appearance and no stenosis is seen in the left subclavian  artery. There is focal mild-to-moderate stenosis at the left vertebral  artery origin. Beyond its origin the left vertebral artery is widely  patent without stenosis to the vertebrobasilar junction. The left common  carotid origin is normal in appearance and no stenosis is seen in the  left common carotid artery. Portions of the mid and left common carotid  artery are obscured by motion artifact. The distal left common carotid  artery and left common carotid bifurcation are normal in appearance. No  stenosis is seen in the left internal carotid artery using the NASCET  criteria. Brachycephalic artery origin is normal in appearance and no  stenosis is seen in the brachycephalic artery. Its bifurcation into the  right subclavian and common carotid artery is  normal in appearance. No  stenosis is seen in the right subclavian artery. The right vertebral  artery origin is normal in appearance and no stenosis is seen in the  right vertebral artery from its origin to the vertebrobasilar junction.  The right common carotid origin is normal in appearance. Portions of the  mid right common carotid artery are obscured by motion artifact. The  distal right common carotid artery and right common carotid bifurcation  is normal in appearance and no stenosis is seen in the right internal  carotid artery using the NASCET criteria.     CT ANGIOGRAM OF THE HEAD: CT angiographic images of the head demonstrate  a normal appearance to the intracranial segments of the distal vertebral  arteries as well as the basilar artery and basilar tip as well as the  posterior cerebral and superior cerebellar arteries bilaterally. The  upper cervical, petrous, cavernous and supracavernous segments of the  internal carotid arteries are normal in appearance. The anterior and  middle cerebral arteries are normal in appearance. The anterior  communicating artery origin and middle cerebral artery trifurcations are  normal in appearance.     IMPRESSION:     1. CT images of the head demonstrate complete opacification of the left  maxillary sinus with circumferential mucosal thickening and hyperdense  central chronic inspissated secretions. Otherwise the CT of the head is  normal. The results of the noncontrast head CT were communicated to Dr. Smith from Stroke Neurology 03/13/2020 at 12:43 PM.     2. CT perfusion study demonstrates no areas of hypoperfusion and no  areas of acute completed infarction in the 10 cm slab of brain imaging  from the mid cerebellar hemispheres through the cerebral hemispheres.     3. There is a focal mild to up to mild-to-moderate stenosis of the left  vertebral artery origin; otherwise, the remainder of the CT angiogram of  the head and neck is within normal limits with no  stenosis seen in the  great vessels of the head or neck.      The final dictated results were communicated to Dr. Smith from Stroke  Neurology and Dr. Galeas by telephone on 03/13/2020 at 1:00 PM.        Radiation dose reduction techniques were utilized, including automated  exposure control and exposure modulation based on body size.     This report was finalized on 3/13/2020 4:15 PM by Dr. Jamar Sears M.D.       CT Angiogram Neck [[id number]] Collected:  03/13/20 1400     Updated:  03/13/20 1618    Narrative:       EMERGENCY CONTRAST-ENHANCED CT ANGIOGRAM OF THE HEAD AND NECK WITH CT  PERFUSION STUDY OF THE HEAD 03/13/2020     CLINICAL HISTORY: Acute onset dizziness, vomiting, possible stroke.     NONCONTRAST HEAD CT      TECHNIQUE: Spiral CT images were obtained from the base of the skull to  the vertex without intravenous contrast and images were reformatted and  submitted in 3 mm thick axial CT section with brain algorithm. 2 mm  thick sagittal and coronal reconstructions were performed and submitted  in brain algorithm.     This is correlated to prior noncontrast head CT from [facility] 01/09/2013.     FINDINGS: The brain parenchyma is normal in attenuation. The ventricles  are normal in size. I see no mass effect and no midline shift and no  extra-axial fluid collections are identified. There is no evidence of  acute intracranial hemorrhage. There is complete opacification of the  left maxillary sinus with circumferential mucosal thickening in the  central, chronic inspissated secretions The remainder of the paranasal  sinuses and mastoid air cells and middle ear cavities are clear. The  calvarium and skull base and orbits are unremarkable.       Impression:       Complete opacification of the left maxillary sinus with  circumferential mucosal thickening and hyperdense central chronic  inspissated secretions. The remainder of the head CT is normal.      The results were communicated to   Sarah, the Stroke Neurologist  while the patient was still under the scanner 03/13/2020 at 12:44 PM and  he requested a CT angiogram of the head and neck and CT perfusion study  of the head for further evaluation.      CT ANGIOGRAM OF THE HEAD AND NECK AND CT PERFUSION STUDY      TECHNIQUE: Spiral CT images were obtained through the head during the  arterial phase of contrast and images were reformatted and analyzed with  rapid software analysis CT perfusion study of the head and subsequently  spiral CT images were obtained from the top of the aortic arch up  through the great vessels of the head and neck during arterial phase of  contrast. Images were reformatted and submitted in 1 mm thick axial,  sagittal and coronal CT sections. Additional 3D reconstructions were  performed to complete the CT angiogram of the head and neck and finally  spiral CT images were obtained from the base of skull to vertex delayed  following intravenous contrast and images were reformatted and submitted  in 3 mm thick axial CT section with brain algorithm.     FINDINGS:      CT PERFUSION: CT perfusion study consists of a 10 cm slab of brain  imaging from the mid cerebellar hemispheres through the cerebral  hemispheres and demonstrates no areas of delayed time to maximal  enhancement of greater than 6 seconds. Thus, no convincing hypoperfused  areas and there are no areas of reduced cerebral blood flow to less than  30% with no areas of acute completed infarction in the 10 cm field of  brain imaging.     CT ANGIOGRAM NECK: The nasopharynx, oropharynx, hypopharynx, true cords  and subglottic airway are normal in appearance. 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 The right lobe of the thyroid is unremarkable.  The lung apices are clear. The parotid glands are atrophic and there is  mild atrophy of this submandibular glands. The   and  parapharyngeal spaces are normal in appearance. No pathologic  lymphadenopathy is seen in the neck. There is some minimal cervical  spondylosis, minimal disc space narrowing and degenerative endplate  changes at C4-5 and C5-6. There is uncovertebral joint spurring with  mild left foraminal narrowing at C5-6 and C6-7. There is anatomic origin  of great vessels off the aortic arch. The left subclavian artery origin  is normal in appearance and no stenosis is seen in the left subclavian  artery. There is focal mild-to-moderate stenosis at the left vertebral  artery origin. Beyond its origin the left vertebral artery is widely  patent without stenosis to the vertebrobasilar junction. The left common  carotid origin is normal in appearance and no stenosis is seen in the  left common carotid artery. Portions of the mid and left common carotid  artery are obscured by motion artifact. The distal left common carotid  artery and left common carotid bifurcation are normal in appearance. No  stenosis is seen in the left internal carotid artery using the NASCET  criteria. Brachycephalic artery origin is normal in appearance and no  stenosis is seen in the brachycephalic artery. Its bifurcation into the  right subclavian and common carotid artery is normal in appearance. No  stenosis is seen in the right subclavian artery. The right vertebral  artery origin is normal in appearance and no stenosis is seen in the  right vertebral artery from its origin to the vertebrobasilar junction.  The right common carotid origin is normal in appearance. Portions of the  mid right common carotid artery are obscured by motion artifact. The  distal right common carotid artery and right common carotid bifurcation  is normal in appearance and no stenosis is seen in the right internal  carotid artery using the NASCET criteria.     CT ANGIOGRAM OF THE HEAD: CT angiographic images of the head demonstrate  a normal appearance to the intracranial  segments of the distal vertebral  arteries as well as the basilar artery and basilar tip as well as the  posterior cerebral and superior cerebellar arteries bilaterally. The  upper cervical, petrous, cavernous and supracavernous segments of the  internal carotid arteries are normal in appearance. The anterior and  middle cerebral arteries are normal in appearance. The anterior  communicating artery origin and middle cerebral artery trifurcations are  normal in appearance.     IMPRESSION:     1. CT images of the head demonstrate complete opacification of the left  maxillary sinus with circumferential mucosal thickening and hyperdense  central chronic inspissated secretions. Otherwise the CT of the head is  normal. The results of the noncontrast head CT were communicated to Dr. Smith from Stroke Neurology 03/13/2020 at 12:43 PM.     2. CT perfusion study demonstrates no areas of hypoperfusion and no  areas of acute completed infarction in the 10 cm slab of brain imaging  from the mid cerebellar hemispheres through the cerebral hemispheres.     3. There is a focal mild to up to mild-to-moderate stenosis of the left  vertebral artery origin; otherwise, the remainder of the CT angiogram of  the head and neck is within normal limits with no stenosis seen in the  great vessels of the head or neck.      The final dictated results were communicated to Dr. Smith from Stroke  Neurology and Dr. Galeas by telephone on 03/13/2020 at 1:00 PM.        Radiation dose reduction techniques were utilized, including automated  exposure control and exposure modulation based on body size.     This report was finalized on 3/13/2020 4:15 PM by Dr. Jamar Sears M.D.       CT Cerebral Perfusion With & Without Contrast [[id number]] Collected:  03/13/20 1400     Updated:  03/13/20 1618    Narrative:       EMERGENCY CONTRAST-ENHANCED CT ANGIOGRAM OF THE HEAD AND NECK WITH CT  PERFUSION STUDY OF THE HEAD 03/13/2020     CLINICAL HISTORY:  Acute onset dizziness, vomiting, possible stroke.     NONCONTRAST HEAD CT      TECHNIQUE: Spiral CT images were obtained from the base of the skull to  the vertex without intravenous contrast and images were reformatted and  submitted in 3 mm thick axial CT section with brain algorithm. 2 mm  thick sagittal and coronal reconstructions were performed and submitted  in brain algorithm.     This is correlated to prior noncontrast head CT from [facility] 01/09/2013.     FINDINGS: The brain parenchyma is normal in attenuation. The ventricles  are normal in size. I see no mass effect and no midline shift and no  extra-axial fluid collections are identified. There is no evidence of  acute intracranial hemorrhage. There is complete opacification of the  left maxillary sinus with circumferential mucosal thickening in the  central, chronic inspissated secretions The remainder of the paranasal  sinuses and mastoid air cells and middle ear cavities are clear. The  calvarium and skull base and orbits are unremarkable.       Impression:       Complete opacification of the left maxillary sinus with  circumferential mucosal thickening and hyperdense central chronic  inspissated secretions. The remainder of the head CT is normal.      The results were communicated to Dr. Smith, the Stroke Neurologist  while the patient was still under the scanner 03/13/2020 at 12:44 PM and  he requested a CT angiogram of the head and neck and CT perfusion study  of the head for further evaluation.      CT ANGIOGRAM OF THE HEAD AND NECK AND CT PERFUSION STUDY      TECHNIQUE: Spiral CT images were obtained through the head during the  arterial phase of contrast and images were reformatted and analyzed with  rapid software analysis CT perfusion study of the head and subsequently  spiral CT images were obtained from the top of the aortic arch up  through the great vessels of the head and neck during arterial phase of  contrast. Images  were reformatted and submitted in 1 mm thick axial,  sagittal and coronal CT sections. Additional 3D reconstructions were  performed to complete the CT angiogram of the head and neck and finally  spiral CT images were obtained from the base of skull to vertex delayed  following intravenous contrast and images were reformatted and submitted  in 3 mm thick axial CT section with brain algorithm.     FINDINGS:      CT PERFUSION: CT perfusion study consists of a 10 cm slab of brain  imaging from the mid cerebellar hemispheres through the cerebral  hemispheres and demonstrates no areas of delayed time to maximal  enhancement of greater than 6 seconds. Thus, no convincing hypoperfused  areas and there are no areas of reduced cerebral blood flow to less than  30% with no areas of acute completed infarction in the 10 cm field of  brain imaging.     CT ANGIOGRAM NECK: The nasopharynx, oropharynx, hypopharynx, true cords  and subglottic airway are normal in appearance. 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 The right lobe of the thyroid is unremarkable.  The lung apices are clear. The parotid glands are atrophic and there is  mild atrophy of this submandibular glands. The  and  parapharyngeal spaces are normal in appearance. No pathologic  lymphadenopathy is seen in the neck. There is some minimal cervical  spondylosis, minimal disc space narrowing and degenerative endplate  changes at C4-5 and C5-6. There is uncovertebral joint spurring with  mild left foraminal narrowing at C5-6 and C6-7. There is anatomic origin  of great vessels off the aortic arch. The left subclavian artery origin  is normal in appearance and no stenosis is seen in the left subclavian  artery. There is focal mild-to-moderate stenosis at the left vertebral  artery origin. Beyond its origin the left vertebral artery is widely  patent without stenosis to the  vertebrobasilar junction. The left common  carotid origin is normal in appearance and no stenosis is seen in the  left common carotid artery. Portions of the mid and left common carotid  artery are obscured by motion artifact. The distal left common carotid  artery and left common carotid bifurcation are normal in appearance. No  stenosis is seen in the left internal carotid artery using the NASCET  criteria. Brachycephalic artery origin is normal in appearance and no  stenosis is seen in the brachycephalic artery. Its bifurcation into the  right subclavian and common carotid artery is normal in appearance. No  stenosis is seen in the right subclavian artery. The right vertebral  artery origin is normal in appearance and no stenosis is seen in the  right vertebral artery from its origin to the vertebrobasilar junction.  The right common carotid origin is normal in appearance. Portions of the  mid right common carotid artery are obscured by motion artifact. The  distal right common carotid artery and right common carotid bifurcation  is normal in appearance and no stenosis is seen in the right internal  carotid artery using the NASCET criteria.     CT ANGIOGRAM OF THE HEAD: CT angiographic images of the head demonstrate  a normal appearance to the intracranial segments of the distal vertebral  arteries as well as the basilar artery and basilar tip as well as the  posterior cerebral and superior cerebellar arteries bilaterally. The  upper cervical, petrous, cavernous and supracavernous segments of the  internal carotid arteries are normal in appearance. The anterior and  middle cerebral arteries are normal in appearance. The anterior  communicating artery origin and middle cerebral artery trifurcations are  normal in appearance.     IMPRESSION:     1. CT images of the head demonstrate complete opacification of the left  maxillary sinus with circumferential mucosal thickening and hyperdense  central chronic inspissated  secretions. Otherwise the CT of the head is  normal. The results of the noncontrast head CT were communicated to Dr. Smith from Stroke Neurology 03/13/2020 at 12:43 PM.     2. CT perfusion study demonstrates no areas of hypoperfusion and no  areas of acute completed infarction in the 10 cm slab of brain imaging  from the mid cerebellar hemispheres through the cerebral hemispheres.     3. There is a focal mild to up to mild-to-moderate stenosis of the left  vertebral artery origin; otherwise, the remainder of the CT angiogram of  the head and neck is within normal limits with no stenosis seen in the  great vessels of the head or neck.      The final dictated results were communicated to Dr. Smith from Stroke  Neurology and Dr. Galeas by telephone on 03/13/2020 at 1:00 PM.        Radiation dose reduction techniques were utilized, including automated  exposure control and exposure modulation based on body size.     This report was finalized on 3/13/2020 4:15 PM by Dr. Jamar Sears M.D.       XR Chest 1 View [[id number]] Collected:  03/13/20 1335     Updated:  03/13/20 1339    Narrative:       XR CHEST 1 VW-     HISTORY: Male who is 79 years-old,  stroke     TECHNIQUE: Frontal view of the chest     COMPARISON: 03/08/2017     FINDINGS: The heart size is borderline. Aorta is tortuous. Pulmonary  vasculature is unremarkable. No focal pulmonary consolidation, pleural  effusion, or pneumothorax. No acute osseous process.       Impression:       No evidence for acute pulmonary process. Borderline heart  size. Tortuous aorta. Follow-up as clinically indicated.     This report was finalized on 3/13/2020 1:36 PM by Dr. Checo Flores M.D.           Results for orders placed during the hospital encounter of 10/04/18   Duplex Venous Lower Extremity - Bilateral CAR    Narrative · Normal bilateral lower extremity venous duplex scan.           Pertinent Labs     Results from last 7 days   Lab Units 03/14/20  0271  03/13/20  1230   WBC 10*3/mm3 16.42* 10.64   HEMOGLOBIN g/dL 15.5 15.7   PLATELETS 10*3/mm3 240 208     Results from last 7 days   Lab Units 03/14/20  0548 03/13/20  1242 03/13/20  1230   SODIUM mmol/L 136  --  135*   POTASSIUM mmol/L 4.5  --  3.7   CHLORIDE mmol/L 103  --  101   CO2 mmol/L 21.3*  --  21.0*   BUN mg/dL 17  --  19   CREATININE mg/dL 1.06 1.10 1.19   GLUCOSE mg/dL 130*  --  147*   Estimated Creatinine Clearance: 68.3 mL/min (by C-G formula based on SCr of 1.06 mg/dL).  Results from last 7 days   Lab Units 03/13/20  1230   ALBUMIN g/dL 4.40   BILIRUBIN mg/dL 0.4   ALK PHOS U/L 69   AST (SGOT) U/L 22   ALT (SGPT) U/L 21     Results from last 7 days   Lab Units 03/14/20  0548 03/13/20  1230   CALCIUM mg/dL 8.5* 8.3*   ALBUMIN g/dL  --  4.40       Results from last 7 days   Lab Units 03/14/20  0548 03/13/20 2022 03/13/20  1230   TROPONIN T ng/mL <0.010 <0.010 <0.010           Invalid input(s): LDLCALC        Test Results Pending at Discharge       Discharge Details        Discharge Medications      New Medications      Instructions Start Date   guaiFENesin 600 MG 12 hr tablet  Commonly known as:  MUCINEX   600 mg, Oral, Every 12 Hours Scheduled      meclizine 25 MG tablet  Commonly known as:  ANTIVERT   25 mg, Oral, 3 Times Daily PRN         Changes to Medications      Instructions Start Date   SUMAtriptan 50 MG tablet  Commonly known as:  IMITREX  What changed:    how much to take  how to take this  when to take this  reasons to take this   Take one tablet at onset of headache. May repeat dose one time in 2 hours if headache not relieved.         Continue These Medications      Instructions Start Date   finasteride 5 MG tablet  Commonly known as:  PROSCAR   5 mg, Oral, Daily      fluticasone 27.5 MCG/SPRAY nasal spray  Commonly known as:  VERAMYST   2 sprays, Nasal, Daily      levothyroxine 75 MCG tablet  Commonly known as:  SYNTHROID, LEVOTHROID   75 mcg, Oral, Every Morning      rosuvastatin 5 MG  tablet  Commonly known as:  CRESTOR   5 mg, Oral, Nightly         Stop These Medications    acetaminophen 325 MG tablet  Commonly known as:  TYLENOL     ondansetron 4 MG tablet  Commonly known as:  ZOFRAN            Allergies   Allergen Reactions   • Ciprofloxacin Hives and Palpitations   • Quinolones Hives and Palpitations         Discharge Disposition:  Home-Health Care Svc    Discharge Diet:  Diet Order   Procedures   • Diet Regular; Cardiac       Discharge Activity:   Activity Instructions     Activity as Tolerated            CODE STATUS:    Code Status and Medical Interventions:   Ordered at: 03/13/20 1428     Code Status:    CPR     Medical Interventions (Level of Support Prior to Arrest):    Full       No future appointments.  Additional Instructions for the Follow-ups that You Need to Schedule     Ambulatory Referral to Home Health   As directed      Face to Face Visit Date:  3/14/2020    Follow-up provider for Plan of Care?:  I treated the patient in an acute care facility and will not continue treatment after discharge.    Follow-up provider:  MICHELE RIVERO [[id number]]    Reason/Clinical Findings:  vertigo    Describe mobility limitations that make leaving home difficult:  Requires assistance of another to leave the home    Nursing/Therapeutic Services Requested:  Physical Therapy    PT orders:  Home safety assessment Gait Training    Weight Bearing Status:  As Tolerated    Frequency:  1 Week 1           Follow-up Information     Michele Rivero MD Follow up in 2 week(s).    Specialty:  Family Medicine  Contact information:  [address] [place] [id number] [phone]             [facility] [place] [facility] .    Specialty:  Home Health Services  Contact information:  [address]  [place] [address]                 Additional Instructions for the Follow-ups that You Need to Schedule     Ambulatory Referral to Home  Health   As directed      Face to Face Visit Date:  3/14/2020    Follow-up provider for Plan of Care?:  I treated the patient in an acute care facility and will not continue treatment after discharge.    Follow-up provider:  IRENE ANGEL [[id number]]    Reason/Clinical Findings:  vertigo    Describe mobility limitations that make leaving home difficult:  Requires assistance of another to leave the home    Nursing/Therapeutic Services Requested:  Physical Therapy    PT orders:  Home safety assessment Gait Training    Weight Bearing Status:  As Tolerated    Frequency:  1 Week 1             Bryan Harley MD  [facility]  03/14/20  2:56 PM

## 2020-03-14 NOTE — PROGRESS NOTES
"DOS: 3/14/2020  NAME: Asa Magaña   : 1940  PCP: Michele Rivero MD  Chief Complaint   Patient presents with   • Dizziness     Patient seen in follow-up today; new to me          Subjective: No events overnight. Vertigo improved although still present w/ gait imbalance. Patient complaining of right ear impaction.     No family at bedside.     Objective:  Vital signs: /67 (BP Location: Left arm, Patient Position: Lying)   Pulse 67   Temp 97.8 °F (36.6 °C) (Oral)   Resp 18   Ht 175.3 cm (69\")   Wt 101 kg (222 lb 6.4 oz)   SpO2 94%   BMI 32.84 kg/m²       HEENT: Normocephalic, atraumatic Right ear cerumen impaction noted.    COR: RRR  Resp: Even and unlabored  Extremities: Equal pulses, non distal embolization    Neurological:   MS: AO. Language normal. No neglect. Higher integrative function normal  CN: II-XII normal  Motor: 5/5, normal tone  Reflexes: Toes downgoing   Sensory: Intact  Coordination: Normal    Laboratory results:  Lab Results   Component Value Date    GLUCOSE 130 (H) 2020    CALCIUM 8.5 (L) 2020     2020    K 4.5 2020    CO2 21.3 (L) 2020     2020    BUN 17 2020    CREATININE 1.06 2020    EGFRIFNONA 68 2020    BCR 16.0 2020    ANIONGAP 11.7 2020     Lab Results   Component Value Date    WBC 16.42 (H) 2020    HGB 15.5 2020    HCT 46.4 2020    MCV 91.5 2020     2020     No results found for: CHOL  Lab Results   Component Value Date    HDL 29 (L) 10/21/2019    HDL 29 (L) 2018     Lab Results   Component Value Date    LDL 77 10/21/2019    LDL 62 2018     Lab Results   Component Value Date    TRIG 158 (H) 10/21/2019    TRIG 156 (H) 2018     Lab Results   Component Value Date    TSH 2.890 10/21/2019     No results found for: LXHDWSGZ10  No results found for: HGBA1C  Lab Results   Component Value Date    CHLPL 138 10/21/2019    CHLPL 122 " 08/13/2018     Lab Results   Component Value Date    TRIG 158 (H) 10/21/2019    TRIG 156 (H) 08/13/2018     Lab Results   Component Value Date    HDL 29 (L) 10/21/2019    HDL 29 (L) 08/13/2018     Lab Results   Component Value Date    LDL 77 10/21/2019    LDL 62 08/13/2018       Review and interpretation of imaging:  STAT MRI OF THE BRAIN WITH AND WITHOUT CONTRAST 03/13/2020     CLINICAL HISTORY: Acute onset of vertigo, dizziness, nausea and  vomiting, evaluate for acute stroke.      TECHNIQUE: Axial T1, FLAIR, fat-suppressed T2, axial diffusion and  gradient echo T2, sagittal T1 and postcontrast axial fat-suppressed T1  and coronal T1-weighted images were obtained of the entire head.      Study is correlated to a contrast-enhanced CT angiogram of the head and  neck and CT perfusion study of the head earlier today 03/13/2020 at  12:40 PM.     FINDINGS: The brain parenchyma is normal in signal intensity.  Specifically no diffusion-weighted abnormality is seen with no acute  infarct identified. On the gradient echo T2 weighted images no acute or  old blood breakdown products are seen intracranially. The ventricles are  normal in size. I see no focal mass effect. There is no midline shift.  No extra-axial fluid collections are identified. No abnormal areas of  enhancement are seen in the head. There is complete opacification of the  left maxillary sinus with circumferential mucosal thickening of the  central contents that are T1 hyperintense, T2 hypointense and on CT  today were hyperdense and felt to be hyperdense chronic inspissated  secretions or hemorrhagic or fungal contents.  The remainder of the  paranasal sinuses, mastoid air cells and middle ear cavities are clear.  Good flow voids are demonstrated within the cerebral vessels and in the  dural venous sinuses.  The calvarium and skull base demonstrate normal  marrow signal intensity.  The orbits are unremarkable.     IMPRESSION:  1. There is complete  opacification of the left maxillary sinus with  circumferential mucosal thickening of the central contents that are T1  hyperintense, T2 hypointense indicating chronic inspissated secretions,  less likely hemorrhagic or fungal contents.  2. The remainder of the MRI of the head is within normal limits.  Specifically no acute infarct is identified and the etiology of the  acute onset vertigo and dizziness is not established on this exam. The  results were communicated to Kenny Smith MD, by telephone 03/13/2020  at 3:25 PM.      This report was finalized on 3/13/2020 4:45 PM by Dr. Jamar Sears M.D.     EMERGENCY CONTRAST-ENHANCED CT ANGIOGRAM OF THE HEAD AND NECK WITH CT  PERFUSION STUDY OF THE HEAD 03/13/2020     CLINICAL HISTORY: Acute onset dizziness, vomiting, possible stroke.     NONCONTRAST HEAD CT      TECHNIQUE: Spiral CT images were obtained from the base of the skull to  the vertex without intravenous contrast and images were reformatted and  submitted in 3 mm thick axial CT section with brain algorithm. 2 mm  thick sagittal and coronal reconstructions were performed and submitted  in brain algorithm.     This is correlated to prior noncontrast head CT from [facility] 01/09/2013.     FINDINGS: The brain parenchyma is normal in attenuation. The ventricles  are normal in size. I see no mass effect and no midline shift and no  extra-axial fluid collections are identified. There is no evidence of  acute intracranial hemorrhage. There is complete opacification of the  left maxillary sinus with circumferential mucosal thickening in the  central, chronic inspissated secretions The remainder of the paranasal  sinuses and mastoid air cells and middle ear cavities are clear. The  calvarium and skull base and orbits are unremarkable.     IMPRESSION:  Complete opacification of the left maxillary sinus with  circumferential mucosal thickening and hyperdense central chronic  inspissated secretions. The  remainder of the head CT is normal.      The results were communicated to Dr. Smith, the Stroke Neurologist  while the patient was still under the scanner 03/13/2020 at 12:44 PM and  he requested a CT angiogram of the head and neck and CT perfusion study  of the head for further evaluation.      CT ANGIOGRAM OF THE HEAD AND NECK AND CT PERFUSION STUDY      TECHNIQUE: Spiral CT images were obtained through the head during the  arterial phase of contrast and images were reformatted and analyzed with  rapid software analysis CT perfusion study of the head and subsequently  spiral CT images were obtained from the top of the aortic arch up  through the great vessels of the head and neck during arterial phase of  contrast. Images were reformatted and submitted in 1 mm thick axial,  sagittal and coronal CT sections. Additional 3D reconstructions were  performed to complete the CT angiogram of the head and neck and finally  spiral CT images were obtained from the base of skull to vertex delayed  following intravenous contrast and images were reformatted and submitted  in 3 mm thick axial CT section with brain algorithm.     FINDINGS:      CT PERFUSION: CT perfusion study consists of a 10 cm slab of brain  imaging from the mid cerebellar hemispheres through the cerebral  hemispheres and demonstrates no areas of delayed time to maximal  enhancement of greater than 6 seconds. Thus, no convincing hypoperfused  areas and there are no areas of reduced cerebral blood flow to less than  30% with no areas of acute completed infarction in the 10 cm field of  brain imaging.     CT ANGIOGRAM NECK: The nasopharynx, oropharynx, hypopharynx, true cords  and subglottic airway are normal in appearance. 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 The right lobe of the thyroid is unremarkable.  The lung apices are clear. The parotid glands are atrophic and  there is  mild atrophy of this submandibular glands. The  and  parapharyngeal spaces are normal in appearance. No pathologic  lymphadenopathy is seen in the neck. There is some minimal cervical  spondylosis, minimal disc space narrowing and degenerative endplate  changes at C4-5 and C5-6. There is uncovertebral joint spurring with  mild left foraminal narrowing at C5-6 and C6-7. There is anatomic origin  of great vessels off the aortic arch. The left subclavian artery origin  is normal in appearance and no stenosis is seen in the left subclavian  artery. There is focal mild-to-moderate stenosis at the left vertebral  artery origin. Beyond its origin the left vertebral artery is widely  patent without stenosis to the vertebrobasilar junction. The left common  carotid origin is normal in appearance and no stenosis is seen in the  left common carotid artery. Portions of the mid and left common carotid  artery are obscured by motion artifact. The distal left common carotid  artery and left common carotid bifurcation are normal in appearance. No  stenosis is seen in the left internal carotid artery using the NASCET  criteria. Brachycephalic artery origin is normal in appearance and no  stenosis is seen in the brachycephalic artery. Its bifurcation into the  right subclavian and common carotid artery is normal in appearance. No  stenosis is seen in the right subclavian artery. The right vertebral  artery origin is normal in appearance and no stenosis is seen in the  right vertebral artery from its origin to the vertebrobasilar junction.  The right common carotid origin is normal in appearance. Portions of the  mid right common carotid artery are obscured by motion artifact. The  distal right common carotid artery and right common carotid bifurcation  is normal in appearance and no stenosis is seen in the right internal  carotid artery using the NASCET criteria.     CT ANGIOGRAM OF THE HEAD: CT angiographic  images of the head demonstrate  a normal appearance to the intracranial segments of the distal vertebral  arteries as well as the basilar artery and basilar tip as well as the  posterior cerebral and superior cerebellar arteries bilaterally. The  upper cervical, petrous, cavernous and supracavernous segments of the  internal carotid arteries are normal in appearance. The anterior and  middle cerebral arteries are normal in appearance. The anterior  communicating artery origin and middle cerebral artery trifurcations are  normal in appearance.     IMPRESSION:     1. CT images of the head demonstrate complete opacification of the left  maxillary sinus with circumferential mucosal thickening and hyperdense  central chronic inspissated secretions. Otherwise the CT of the head is  normal. The results of the noncontrast head CT were communicated to Dr. Smith from Stroke Neurology 03/13/2020 at 12:43 PM.     2. CT perfusion study demonstrates no areas of hypoperfusion and no  areas of acute completed infarction in the 10 cm slab of brain imaging  from the mid cerebellar hemispheres through the cerebral hemispheres.     3. There is a focal mild to up to mild-to-moderate stenosis of the left  vertebral artery origin; otherwise, the remainder of the CT angiogram of  the head and neck is within normal limits with no stenosis seen in the  great vessels of the head or neck.      The final dictated results were communicated to Dr. Smith from Stroke  Neurology and Dr. Galeas by telephone on 03/13/2020 at 1:00 PM.        Radiation dose reduction techniques were utilized, including automated  exposure control and exposure modulation based on body size.     This report was finalized on 3/13/2020 4:15 PM by Dr. Jamar Sears M.D.     XR CHEST 1 VW-     HISTORY: Male who is 79 years-old,  stroke     TECHNIQUE: Frontal view of the chest     COMPARISON: 03/08/2017     FINDINGS: The heart size is borderline. Aorta is tortuous.  Pulmonary  vasculature is unremarkable. No focal pulmonary consolidation, pleural  effusion, or pneumothorax. No acute osseous process.     IMPRESSION:  No evidence for acute pulmonary process. Borderline heart  size. Tortuous aorta. Follow-up as clinically indicated.     This report was finalized on 3/13/2020 1:36 PM by Dr. Checo Flores M.D.    Impression: This is a 79-year-old male with history of BPH, asthma, prior kidney stones, migraine headaches, hyperlipidemia, hypothyroidism who presented to [facility] 3/13 due to complaint of acute onset of vertigo with room spinning sensation which improved and then returned again approximately 3 hours later along with nausea and vomiting.  According to the chart patient had some muffled speech and associated left lower extremity numbness on arrival.  CT of the head normal; negative for acute stroke.  CTA showed mild to moderate stenosis in the left vertebral artery origin otherwise unremarkable.  CT perfusion showed no perfusion mismatch.  MRI of the brain showed complete opacification of the left maxillary sinus with mucosal thickening.  No evidence of infarct seen on this study.TSH 2.890, LDL 77.    Neurologically,stable; improving.  Recommendations below. PT/OT/ST. CCP to assist with discharge planning. Call RRT for any acute neurological changes and/or concerns. We will continue to follow and advise.     Differential:  · Acute vertigo likely secondary to vestibular neuritis; negative imaging and nonfocal exam    Plan:  Antivert 12.5 -25mg QID for dizziness   If gait not improving consider OP therapy   D/c  mg daily (on no antiplatelets prior to arrival)  Crestor 5mg  (LDL 77) on prior to arrival  Neurochecks  BP control  Stroke Education  WONG/SCDs  PT/OT/ST  Follow-up w/ me as needed.  Consider Debrox to decrease ear wax build-up; follow-up w/ ENT     Case reviewed with  and he agress with plan above    Aliya Galan,  APRN

## 2020-10-27 ENCOUNTER — OFFICE VISIT (OUTPATIENT)
Dept: ORTHOPEDIC SURGERY | Facility: CLINIC | Age: 80
End: 2020-10-27

## 2020-10-27 VITALS — WEIGHT: 200.4 LBS | TEMPERATURE: 97.1 F | HEIGHT: 72 IN | BODY MASS INDEX: 27.14 KG/M2

## 2020-10-27 DIAGNOSIS — M25.561 PAIN IN BOTH KNEES, UNSPECIFIED CHRONICITY: Primary | ICD-10-CM

## 2020-10-27 DIAGNOSIS — M25.562 PAIN IN BOTH KNEES, UNSPECIFIED CHRONICITY: Primary | ICD-10-CM

## 2020-10-27 PROCEDURE — 73562 X-RAY EXAM OF KNEE 3: CPT | Performed by: NURSE PRACTITIONER

## 2020-10-27 PROCEDURE — 99214 OFFICE O/P EST MOD 30 MIN: CPT | Performed by: NURSE PRACTITIONER

## 2020-10-27 RX ORDER — ACETAMINOPHEN 500 MG
500-1000 TABLET ORAL
COMMUNITY
Start: 2020-10-26 | End: 2020-11-25

## 2020-10-27 NOTE — PROGRESS NOTES
Patient: Asa Magaña  YOB: 1940 80 y.o. male  Medical Record Number: [id number]    Chief Complaints: Bilateral knee pain    History of Present Illness:Asa Magaña is a 80 y.o. male who presents with complaints of bilateral knee pain right greater than left.  Patient has known end-stage osteoarthritis of the right knee and had previous left knee unicompartmental replacement several years ago.  He describes the right knee pain as a severe constant sharp type pain worse with standing walking, better with rest.  Describes the left knee more as a mild to moderate annoying ache mostly along the lateral aspect of the knee.  Patient denies any injury.    Allergies:   Allergies   Allergen Reactions   • Ciprofloxacin Hives and Palpitations   • Quinolones Hives and Palpitations       Medications:   Current Outpatient Medications   Medication Sig Dispense Refill   • finasteride (PROSCAR) 5 MG tablet Take 5 mg by mouth Daily.     • fluticasone (VERAMYST) 27.5 MCG/SPRAY nasal spray 2 sprays into the nostril(s) as directed by provider Daily.     • guaiFENesin (MUCINEX) 600 MG 12 hr tablet Take 1 tablet by mouth Every 12 (Twelve) Hours.     • levothyroxine (SYNTHROID, LEVOTHROID) 75 MCG tablet Take 75 mcg by mouth Every Morning.     • meclizine (ANTIVERT) 25 MG tablet Take 1 tablet by mouth 3 (Three) Times a Day As Needed for Dizziness. 30 tablet 0   • rosuvastatin (CRESTOR) 5 MG tablet Take 5 mg by mouth Every Night.     • SUMAtriptan (IMITREX) 50 MG tablet Take one tablet at onset of headache. May repeat dose one time in 2 hours if headache not relieved. (Patient taking differently: Take 50 mg by mouth 1 (One) Time As Needed. Take one tablet at onset of headache. May repeat dose one time in 2 hours if headache not relieved.)  0     No current facility-administered medications for this visit.      Facility-Administered Medications Ordered in Other Visits   Medication Dose Route Frequency Provider Last Rate  "Last Dose   • mupirocin (BACTROBAN) 2 % nasal ointment   Nasal BID Davin Beasley MD             The following portions of the patient's history were reviewed and updated as appropriate: allergies, current medications, past family history, past medical history, past social history, past surgical history and problem list.    Review of Systems:   A 14 point review of systems was performed. All systems negative except pertinent positives/negative listed in HPI above    Physical Exam:   Vitals:    10/27/20 1103   Temp: 97.1 °F (36.2 °C)   Weight: 90.9 kg (200 lb 6.4 oz)   Height: 182.9 cm (72\")   PainSc:   7       General: A and O x 3, ASA, NAD    SCLERA:    Normal    DENTITION:   Normal  Skin clear no unusual lesions noted  Bilateral knees right knee patient has trace amount of effusion noted with 110 degrees flexion neutral extension with a positive Trip negative Lockman calf soft and nontender  Left knee patient is well-healed surgical incision noted ex range of motion with no instability calf is soft and nontender    Radiology:  Xrays 3views (ap,lateral, sunrise) bilateral knees were ordered and reviewed today secondary to pain patient has end-stage osteoarthritis of the right knee he has well-placed well-positioned left knee unicompartmental replacement.  Compared to views are unchanged    Assessment/Plan:  End-stage osteoarthritis right knee  Status post left U KA stable    Dr. Beasley saw the patient as well.  We will proceed with right total knee replacement  Continuation of conservative management vs. TKA discussed.  The patient wishes to proceed with total knee replacement.  At this point the patient has failed the full compliment of conservative treatment and stating complete understanding of the risks/benefits/ anternatives wishes to proceed with surgical treatment.    Risk and benefits of surgery were reviewed.  Including, but not limited to, blood clots or pulmonary embolism, anesthesia risk, infection, " fracture, skin/leg numbness, persistent pain/crepitance/popping/catching, failure of the implant, need for future surgeries, hematoma, possible nerve or blood vessel injury, need for transfusion, and potential risk of stroke,heart attack or death, among others.  The patient understands and wishes to proceed.     It was explained that if tissue has been repaired or reconstructed, there is also an increased chance of failure which may require further management.  Following the completion of the discussion, the patient expressed understanding of this planned course of care, all their questions were answered and consent will be obtained preoperatively.    Operative Plan: Smith and Nephrachell Oxinium Total Knee Replacement an overnight staywith home health rehab

## 2020-11-05 ENCOUNTER — TELEPHONE (OUTPATIENT)
Dept: ORTHOPEDIC SURGERY | Facility: CLINIC | Age: 80
End: 2020-11-05

## 2020-11-05 DIAGNOSIS — M25.561 CHRONIC PAIN OF RIGHT KNEE: Primary | ICD-10-CM

## 2020-11-05 DIAGNOSIS — G89.29 CHRONIC PAIN OF RIGHT KNEE: Primary | ICD-10-CM

## 2020-11-05 RX ORDER — CEFAZOLIN SODIUM 2 G/100ML
2 INJECTION, SOLUTION INTRAVENOUS ONCE
Status: CANCELLED | OUTPATIENT
Start: 2021-01-25 | End: 2020-11-05

## 2020-11-05 RX ORDER — PREGABALIN 75 MG/1
150 CAPSULE ORAL ONCE
Status: CANCELLED | OUTPATIENT
Start: 2021-01-25 | End: 2020-11-05

## 2020-11-05 RX ORDER — MELOXICAM 15 MG/1
15 TABLET ORAL ONCE
Status: CANCELLED | OUTPATIENT
Start: 2021-01-25 | End: 2020-11-05

## 2020-11-05 NOTE — TELEPHONE ENCOUNTER
----- Message from Asa Magaña sent at 11/4/2020  7:29 PM EST -----  Regarding: Visit Follow-Up Question  Contact: [phone]  Fe,  When I was in your office it was discussed that I should go to physical therapy .  I have not received any information about when or where I should go. In addition when should I expect to get on the schedule for the knee replacement ?    Thanks   Rachid Magaña [phone]

## 2020-11-06 PROBLEM — M25.561 CHRONIC PAIN OF RIGHT KNEE: Status: ACTIVE | Noted: 2020-11-06

## 2020-11-06 PROBLEM — G89.29 CHRONIC PAIN OF RIGHT KNEE: Status: ACTIVE | Noted: 2020-11-06

## 2020-11-10 ENCOUNTER — HOSPITAL ENCOUNTER (OUTPATIENT)
Dept: PHYSICAL THERAPY | Facility: HOSPITAL | Age: 80
Setting detail: THERAPIES SERIES
Discharge: HOME | End: 2020-11-10

## 2020-11-10 DIAGNOSIS — M25.661 DECREASED ROM OF RIGHT KNEE: ICD-10-CM

## 2020-11-10 DIAGNOSIS — G89.29 CHRONIC PAIN OF RIGHT KNEE: Primary | ICD-10-CM

## 2020-11-10 DIAGNOSIS — R26.2 DIFFICULTY WALKING: ICD-10-CM

## 2020-11-10 DIAGNOSIS — M25.561 CHRONIC PAIN OF RIGHT KNEE: Primary | ICD-10-CM

## 2020-11-10 PROCEDURE — 97110 THERAPEUTIC EXERCISES: CPT

## 2020-11-10 PROCEDURE — 97161 PT EVAL LOW COMPLEX 20 MIN: CPT

## 2020-11-10 NOTE — THERAPY EVALUATION
Outpatient Physical Therapy Ortho Initial Evaluation  [facility]     Patient Name: Asa Magaña  : 1940  MRN: [id number]  Today's Date: 11/10/2020      Visit Date: 11/10/2020    Patient Active Problem List   Diagnosis   • Asthma   • Hyperlipidemia   • Lumbago   • Hypothyroidism   • Hx of migraine headaches   • BPH (benign prostatic hyperplasia)   • Pyelonephritis   • CAP (community acquired pneumonia)   • Sepsis (CMS/HCC)   • Rectal bleeding   • Primary osteoarthritis of left knee   • Vertigo   • Chronic maxillary sinusitis   • Chest pain, atypical   • Chronic pain of right knee        Past Medical History:   Diagnosis Date   • Asthma    • BPH (benign prostatic hyperplasia)    • History of kidney stones    • History of sepsis 2017    BLADDER INFECTION AND PNEUMONIA BOTH TWICE   • Hx of migraine headaches    • Hyperlipidemia    • Hypothyroidism    • Knee pain    • Skin abrasion     LEFT FOREARM THAT HAS NOW HEALED        Past Surgical History:   Procedure Laterality Date   • COLONOSCOPY  2016    benign polyp-repeat 5 years- Filemon Beasley M.D.   • CYSTOSCOPY TRANSURETHRAL RESECTION OF PROSTATE     • CYSTOSCOPY W/ URETERAL STENT PLACEMENT Right 2017    Procedure: CYSTOSCOPY, ureteroscopy,  URETERAL STENT INSERTION, RT;  Surgeon: Shashi Arciniega MD;  Location: [facility];  Service:    • HERNIA REPAIR     • KNEE ARTHROPLASTY UNICOMPARTMENTAL Left 2018    Procedure: KNEE ARTHROPLASTY UNICOMPARTMENTAL;  Surgeon: Davin Beasley MD;  Location: [facility];  Service: Orthopedics   • KNEE SURGERY      KNEE ARTHROSCOPY X3   • THYROIDECTOMY         Visit Dx:     ICD-10-CM ICD-9-CM   1. Chronic pain of right knee  M25.561 719.46    G89.29 338.29   2. Difficulty walking  R26.2 719.7   3. Decreased ROM of right knee  M25.661 719.56         Patient History     Row Name 11/10/20 1000 20 0751          History    Chief Complaint  Pain  -CA  Pain  (Pended)   (Patient-Rptd)   -patient      Type of Pain  Hand pain;Knee pain  -CA  Hand pain;Knee pain  (Pended)   (Patient-Rptd)   -patient     Date Current Problem(s) Began  06/08/16  -CA  06/08/16  (Pended)   (Patient-Rptd)   -patient     Brief Description of Current Complaint  Asa Magaña is a 80 y.o. male who presents today with R knee pain for R TKA prehab (scheduled on 1/25/2021). PMH includes L UKA. Pt reports difficulty/increased pain with ascending/descending stairs (no hand rails, one at a time, one hand rail reciprocal), walking (used to be able to walk for 45 min straight), kneeling, and bending the knee. Pt reports pain relief with rest, ice, and Tylenol. Pt reports he is nervous navigating stairs because he almost fall. Pt reports he fell about 3-4 months ago because he passed out (due to vestibular), prescribed medication and no troubles with that since. PMH L UKA, otherwise unremarkable.  -CA  Severe pain in knees  (Pended)   (Patient-Rptd)   -patient     Patient/Caregiver Goals  Relieve pain  -CA  Relieve pain  (Pended)   (Patient-Rptd)   -patient     Hand Dominance  right-handed  -CA  right-handed  (Pended)   (Patient-Rptd)   -patient     Occupation/sports/leisure activities  Walk 4 times weekly 45 to 60 minutes each day.  -CA  Walk 4 times weekly 45 to 60 minutes each day.  (Pended)   (Patient-Rptd)   -patient     Patient seeing anyone else for problem(s)?  Dr. Beasley  -CA  no  (Pended)   (Patient-Rptd)   -patient     What clinical tests have you had for this problem?  X-ray  -CA  X-ray  (Pended)   (Patient-Rptd)   -patient     Results of Clinical Tests  see Epic  -CA  --        Pain     Pain Location  Knee  -CA  --     Pain at Present  5  -CA  --     Pain at Best  2  -CA  --     Pain at Worst  7  -CA  --        Fall Risk Assessment    Any falls in the past year:  Yes  -CA  No  (Pended)   (Patient-Rptd)   -patient     Number of falls reported in the last 12 months  1  -CA  --     Factors that contributed to the fall:  Other (comment)  passed out  -CA  --        Services    Prior Rehab/Home Health Experiences  Yes  -CA  Yes  (Pended)   (Patient-Rptd)   -patient     Are you currently receiving Home Health services  No  -CA  No  (Pended)   (Patient-Rptd)   -patient     Do you plan to receive Home Health services in the near future  No  -CA  No  (Pended)   (Patient-Rptd)   -patient        Daily Activities    Primary Language  English  -CA  English  (Pended)   (Patient-Rptd)   -patient     How does patient learn best?  Listening  -CA  Listening  (Pended)   (Patient-Rptd)   -patient        Safety    Are you being hurt, hit, or frightened by anyone at home or in your life?  No  -CA  No  (Pended)   (Patient-Rptd)   -patient     Are you being neglected by a caregiver  No  -CA  No  (Pended)   (Patient-Rptd)   -patient     Have you had any of the following issues with  N/A  -CA  N/A  (Pended)   (Patient-Rptd)   -patient       User Key  (r) = Recorded By, (t) = Taken By, (c) = Cosigned By    Initials Name Provider Type    CA Nae Humphrey, PT Physical Therapist    patient Asa Magaña --          PT Ortho     Row Name 11/10/20 1000       Right Lower Ext    Rt Knee Extension/Flexion AROM  2-115 lacking full ext  -CA       Left Lower Ext    Lt Knee Extension/Flexion AROM  5-0-125  -CA       MMT Right Lower Ext    Rt Hip Flexion MMT, Gross Movement  (5/5) normal  -CA    Rt Hip ABduction MMT, Gross Movement  (3/5) fair  -CA    Rt Knee Extension MMT, Gross Movement  (5/5) normal  -CA    Rt Knee Flexion MMT, Gross Movement  (5/5) normal  -CA       MMT Left Lower Ext    Lt Hip Flexion MMT, Gross Movement  (5/5) normal  -CA    Lt Hip ABduction MMT, Gross Movement  (3/5) fair  -CA    Lt Knee Extension MMT, Gross Movement  (5/5) normal  -CA    Lt Knee Flexion MMT, Gross Movement  (5/5) normal  -CA       Sensation    Sensation WNL?  WNL  -CA       Lower Extremity Flexibility    Hamstrings  Bilateral:;Mildly limited  -CA       Gait/Stairs (Locomotion)    Comment  (Gait/Stairs)  decreased R heel initial contact decreased terminal knee ext on R, fwd flexed posture  -CA      User Key  (r) = Recorded By, (t) = Taken By, (c) = Cosigned By    Initials Name Provider Type    Nae Gupta, ANA MARÍA Physical Therapist                      Therapy Education  Education Details: Access code: [email or web address]; eval findings, TKA expectations, POC, TKA outcomes, anatomy  Given: HEP, Symptoms/condition management, Pain management  Program: New  How Provided: Verbal, Demonstration, Written  Provided to: Patient  Level of Understanding: Teach back education performed, Verbalized, Demonstrated     PT OP Goals     Row Name 11/10/20 1000          PT Short Term Goals    STG Date to Achieve  12/08/20  -CA     STG 1  Pt will be independent with initial HEP to improve strength/ROM and decrease pain prior to TKA.  -CA     STG 1 Progress  New  -CA     STG 2  Pt will improve R knee AROM from 2-115 deg to 0-120 deg to improve ability to navigate stairs.  -CA     STG 2 Progress  New  -CA     STG 3  Pt will improve B hip abd strength to at least 4/5.  -CA     STG 3 Progress  New  -CA        Long Term Goals    LTG Date to Achieve  01/05/21  -CA     LTG 1  Pt will be independent with advanced HEP to improve strength/ROM and decrease pain prior to TKA.  -CA     LTG 1 Progress  New  -CA     LTG 2  Pt will be able to ascend/descend stairs in a reciprocal pattern with </=3/10 R knee pain.  -CA     LTG 2 Progress  New  -CA     LTG 3  Pt will report </= 3/10 pain with ADLs to improve quality of life.  -CA     LTG 3 Progress  New  -CA     LTG 4  Pt will improve KOS score to at least 70% to show improved quality of life.  -CA     LTG 4 Progress  New  -CA       User Key  (r) = Recorded By, (t) = Taken By, (c) = Cosigned By    Initials Name Provider Type    Nae Gupta PT Physical Therapist          PT Assessment/Plan     Row Name 11/10/20 1000          PT Assessment    Functional Limitations  Limitations in  functional capacity and performance;Performance in leisure activities  -CA     Impairments  Impaired flexibility;Muscle strength;Pain;Joint integrity;Range of motion;Gait  -CA     Assessment Comments  Asa Magaña is a 80 y.o. male referred to physical therapy for R knee OA/TKA prehab. He presents with a stable clinical presentation, along with no remarkable comorbidities and personal factors of hx of L UKA with some returning L knee pain that may impact his progress in the plan of care. Pt presents today with decreased R knee ROM, B hip weakness, gait impairments, and decreased B LE flexibility. his signs and symptoms are consistent with referring diagnosis. The previous impairments limit his ability to walk, navigate steps, stand, bend knee, and perform ADLs. Pt will benefit from skilled PT to address the previous impairments and return to PLOF.  -CA     Please refer to paper survey for additional self-reported information  No forgot to fill, out will address next visit  -CA     Rehab Potential  Good  -CA     Patient/caregiver participated in establishment of treatment plan and goals  Yes  -CA     Patient would benefit from skilled therapy intervention  Yes  -CA        PT Plan    PT Frequency  2x/week  -CA     Predicted Duration of Therapy Intervention (PT)  3-4 weeks  -CA     Planned CPT's?  PT EVAL LOW COMPLEXITY: 50087;PT RE-EVAL: 89930;PT THER PROC EA 15 MIN: 85268;PT THER ACT EA 15 MIN: 85182;PT MANUAL THERAPY EA 15 MIN: 00321;PT NEUROMUSC RE-EDUCATION EA 15 MIN: 70786;PT GAIT TRAINING EA 15 MIN: 26524;PT SELF CARE/HOME MGMT/TRAIN EA 15: 29100;PT HOT OR COLD PACK TREAT MCARE  -CA     PT Plan Comments  PT POC includes TKA prehab, B LE strengthening/flexibility, knee ROM, gait training, education on TKA outcomes/expectations, HEP, and manual therapy as needed. See flow sheet for progressions next visit. Fill out KOS  -CA       User Key  (r) = Recorded By, (t) = Taken By, (c) = Cosigned By    Initials Name  Provider Type    Nae Gupta PT Physical Therapist            OP Exercises     Row Name 11/10/20 1000             Total Minutes    45781 - PT Therapeutic Exercise Minutes  23 including education (See ed section for details)  -CA         Exercise 1    Exercise Name 1  nu step  -CA      Additional Comments  next visit  -CA         Exercise 2    Exercise Name 2  QS with towel roll  -CA      Reps 2  15  -CA      Time 2  5s  -CA         Exercise 3    Exercise Name 3  Heel slide with sheet  -CA      Cueing 3  Verbal  -CA      Reps 3  10  -CA      Time 3  10s  -CA         Exercise 4    Exercise Name 4  HL hip abd  -CA      Cueing 4  Verbal  -CA      Sets 4  2  -CA      Reps 4  10  -CA      Time 4  GTB  -CA         Exercise 5    Exercise Name 5  SAQ  -CA      Additional Comments  next visit  -CA         Exercise 6    Exercise Name 6  B hamstring str seated  -CA      Additional Comments  next visit  -CA         Exercise 7    Exercise Name 7  LAQ  -CA      Additional Comments  next visit  -CA         Exercise 8    Exercise Name 8  Knee flex on step  -CA      Additional Comments  next visit  -CA        User Key  (r) = Recorded By, (t) = Taken By, (c) = Cosigned By    Initials Name Provider Type    Nae Gupta PT Physical Therapist                                  Time Calculation:     Start Time: 1030  Stop Time: 1110  Time Calculation (min): 40 min  Total Timed Code Minutes- PT: 23 minute(s)     Therapy Charges for Today     Code Description Service Date Service Provider Modifiers Qty    90800523251 HC PT THER PROC EA 15 MIN 11/10/2020 Nae Humphrey, PT GP 2    10986900324 HC PT EVAL LOW COMPLEXITY 1 11/10/2020 Nae Humphrey, PT GP 1                    Nae Humphrey PT  11/10/2020

## 2020-11-12 ENCOUNTER — HOSPITAL ENCOUNTER (OUTPATIENT)
Dept: PHYSICAL THERAPY | Facility: HOSPITAL | Age: 80
Setting detail: THERAPIES SERIES
Discharge: HOME | End: 2020-11-12

## 2020-11-12 DIAGNOSIS — G89.29 CHRONIC PAIN OF RIGHT KNEE: Primary | ICD-10-CM

## 2020-11-12 DIAGNOSIS — R26.2 DIFFICULTY WALKING: ICD-10-CM

## 2020-11-12 DIAGNOSIS — M25.561 CHRONIC PAIN OF RIGHT KNEE: Primary | ICD-10-CM

## 2020-11-12 DIAGNOSIS — M25.661 DECREASED ROM OF RIGHT KNEE: ICD-10-CM

## 2020-11-12 PROCEDURE — 97110 THERAPEUTIC EXERCISES: CPT

## 2020-11-12 NOTE — THERAPY TREATMENT NOTE
Outpatient Physical Therapy Ortho Treatment Note  [facility]     Patient Name: Asa Magaña  : 1940  MRN: [id number]  Today's Date: 2020      Visit Date: 2020    Visit Dx:    ICD-10-CM ICD-9-CM   1. Chronic pain of right knee  M25.561 719.46    G89.29 338.29   2. Difficulty walking  R26.2 719.7   3. Decreased ROM of right knee  M25.661 719.56       Patient Active Problem List   Diagnosis   • Asthma   • Hyperlipidemia   • Lumbago   • Hypothyroidism   • Hx of migraine headaches   • BPH (benign prostatic hyperplasia)   • Pyelonephritis   • CAP (community acquired pneumonia)   • Sepsis (CMS/HCC)   • Rectal bleeding   • Primary osteoarthritis of left knee   • Vertigo   • Chronic maxillary sinusitis   • Chest pain, atypical   • Chronic pain of right knee        Past Medical History:   Diagnosis Date   • Asthma    • BPH (benign prostatic hyperplasia)    • History of kidney stones    • History of sepsis 2017    BLADDER INFECTION AND PNEUMONIA BOTH TWICE   • Hx of migraine headaches    • Hyperlipidemia    • Hypothyroidism    • Knee pain    • Skin abrasion     LEFT FOREARM THAT HAS NOW HEALED        Past Surgical History:   Procedure Laterality Date   • COLONOSCOPY  2016    benign polyp-repeat 5 years- Filemon Beasley M.D.   • CYSTOSCOPY TRANSURETHRAL RESECTION OF PROSTATE     • CYSTOSCOPY W/ URETERAL STENT PLACEMENT Right 2017    Procedure: CYSTOSCOPY, ureteroscopy,  URETERAL STENT INSERTION, RT;  Surgeon: Shashi Arciniega MD;  Location: [facility];  Service:    • HERNIA REPAIR     • KNEE ARTHROPLASTY UNICOMPARTMENTAL Left 2018    Procedure: KNEE ARTHROPLASTY UNICOMPARTMENTAL;  Surgeon: Davin Beasley MD;  Location: [facility];  Service: Orthopedics   • KNEE SURGERY      KNEE ARTHROSCOPY X3   • THYROIDECTOMY                         PT Assessment/Plan     Row Name 20 1200          PT Assessment    Assessment Comments  Rachid returns for first f/u - reports pain with HEP.  Review today, and pain with heel slide - encouraged to stay in pain free range. Progressed pre-hab exercises. Pt asking to cancel some appts to cont with 1x/ week for 2 more weeks until indep with HEP due to high co pay. No increased pain at end of visit. Pt has 5# ankle weight for home, HEP updated.  -CA        PT Plan    PT Plan Comments  2 more visits to solidify advanced HEP -progress to standing ther ex next visit and give HEP. Then likely DC the following visit.  -CA       User Key  (r) = Recorded By, (t) = Taken By, (c) = Cosigned By    Initials Name Provider Type    CA Nae Humphrey, PT Physical Therapist            OP Exercises     Row Name 11/12/20 1100             Subjective Comments    Subjective Comments  The exercises made me a little sore at home  -CA         Subjective Pain    Able to rate subjective pain?  yes  -CA      Pre-Treatment Pain Level  4  -CA         Total Minutes    80177 - PT Therapeutic Exercise Minutes  40  -CA         Exercise 1    Exercise Name 1  nu step  -CA      Cueing 1  Verbal  -CA      Time 1  5 min  -CA      Additional Comments  lvl 1  -CA         Exercise 2    Exercise Name 2  QS with towel roll  -CA      Reps 2  15  -CA      Time 2  5s  -CA         Exercise 3    Exercise Name 3  Heel slide with sheet  -CA      Cueing 3  Verbal  -CA      Reps 3  10  -CA      Time 3  10s  -CA         Exercise 4    Exercise Name 4  HL hip abd  -CA      Cueing 4  Verbal  -CA      Sets 4  2  -CA      Reps 4  10  -CA      Time 4  BTB  -CA         Exercise 5    Exercise Name 5  SAQ  -CA      Cueing 5  Verbal  -CA      Sets 5  2  -CA      Reps 5  10  -CA      Time 5  4#  -CA         Exercise 6    Exercise Name 6  B hamstring str step  -CA      Cueing 6  Verbal  -CA      Reps 6  3  -CA      Time 6  20s  -CA         Exercise 7    Exercise Name 7  LAQ  -CA      Cueing 7  Verbal  -CA      Sets 7  2  -CA      Reps 7  10  -CA      Time 7  4#  -CA         Exercise 8    Exercise Name 8  Knee flex on step   -CA      Cueing 8  Verbal  -CA      Reps 8  3  -CA      Time 8  20s  -CA      Additional Comments  third step  -CA        User Key  (r) = Recorded By, (t) = Taken By, (c) = Cosigned By    Initials Name Provider Type    Nae Gupta, ANA MARÍA Physical Therapist                       PT OP Goals     Row Name 11/12/20 1200          PT Short Term Goals    STG Date to Achieve  12/08/20  -CA     STG 1  Pt will be independent with initial HEP to improve strength/ROM and decrease pain prior to TKA.  -CA     STG 1 Progress  Met  -CA     STG 2  Pt will improve R knee AROM from 2-115 deg to 0-120 deg to improve ability to navigate stairs.  -CA     STG 2 Progress  Ongoing  -CA     STG 3  Pt will improve B hip abd strength to at least 4/5.  -CA     STG 3 Progress  Ongoing  -CA        Long Term Goals    LTG Date to Achieve  01/05/21  -CA     LTG 1  Pt will be independent with advanced HEP to improve strength/ROM and decrease pain prior to TKA.  -CA     LTG 1 Progress  Ongoing  -CA     LTG 2  Pt will be able to ascend/descend stairs in a reciprocal pattern with </=3/10 R knee pain.  -CA     LTG 2 Progress  Ongoing  -CA     LTG 3  Pt will report </= 3/10 pain with ADLs to improve quality of life.  -CA     LTG 3 Progress  Ongoing  -CA     LTG 4  Pt will improve KOS score to at least 70% to show improved quality of life.  -CA     LTG 4 Progress  Ongoing  -CA       User Key  (r) = Recorded By, (t) = Taken By, (c) = Cosigned By    Initials Name Provider Type    Nae Gupta PT Physical Therapist          Therapy Education  Education Details: Access code: [email or web address]  Given: HEP  Program: Progressed  How Provided: Verbal, Demonstration, Written  Provided to: Patient  Level of Understanding: Teach back education performed, Verbalized, Demonstrated              Time Calculation:   Start Time: 1150  Stop Time: 1230  Time Calculation (min): 40 min  Total Timed Code Minutes- PT: 40 minute(s)  Therapy Charges for Today     Code  Description Service Date Service Provider Modifiers Qty    97410779544 HC PT THER PROC EA 15 MIN 11/12/2020 Nae Humphrey, PT GP 3                    Nae Humphrey, PT  11/12/2020

## 2020-11-18 ENCOUNTER — HOSPITAL ENCOUNTER (OUTPATIENT)
Dept: PHYSICAL THERAPY | Facility: HOSPITAL | Age: 80
Setting detail: THERAPIES SERIES
Discharge: HOME | End: 2020-11-18

## 2020-11-18 DIAGNOSIS — R26.2 DIFFICULTY WALKING: ICD-10-CM

## 2020-11-18 DIAGNOSIS — G89.29 CHRONIC PAIN OF RIGHT KNEE: Primary | ICD-10-CM

## 2020-11-18 DIAGNOSIS — M25.561 CHRONIC PAIN OF RIGHT KNEE: Primary | ICD-10-CM

## 2020-11-18 DIAGNOSIS — M25.661 DECREASED ROM OF RIGHT KNEE: ICD-10-CM

## 2020-11-18 PROCEDURE — 97110 THERAPEUTIC EXERCISES: CPT

## 2020-11-18 NOTE — THERAPY TREATMENT NOTE
Outpatient Physical Therapy Ortho Treatment Note  [facility]     Patient Name: Asa Magaña  : 1940  MRN: [id number]  Today's Date: 2020      Visit Date: 2020    Visit Dx:    ICD-10-CM ICD-9-CM   1. Chronic pain of right knee  M25.561 719.46    G89.29 338.29   2. Difficulty walking  R26.2 719.7   3. Decreased ROM of right knee  M25.661 719.56       Patient Active Problem List   Diagnosis   • Asthma   • Hyperlipidemia   • Lumbago   • Hypothyroidism   • Hx of migraine headaches   • BPH (benign prostatic hyperplasia)   • Pyelonephritis   • CAP (community acquired pneumonia)   • Sepsis (CMS/HCC)   • Rectal bleeding   • Primary osteoarthritis of left knee   • Vertigo   • Chronic maxillary sinusitis   • Chest pain, atypical   • Chronic pain of right knee        Past Medical History:   Diagnosis Date   • Asthma    • BPH (benign prostatic hyperplasia)    • History of kidney stones    • History of sepsis 2017    BLADDER INFECTION AND PNEUMONIA BOTH TWICE   • Hx of migraine headaches    • Hyperlipidemia    • Hypothyroidism    • Knee pain    • Skin abrasion     LEFT FOREARM THAT HAS NOW HEALED        Past Surgical History:   Procedure Laterality Date   • COLONOSCOPY  2016    benign polyp-repeat 5 years- Filemon Beasley M.D.   • CYSTOSCOPY TRANSURETHRAL RESECTION OF PROSTATE     • CYSTOSCOPY W/ URETERAL STENT PLACEMENT Right 2017    Procedure: CYSTOSCOPY, ureteroscopy,  URETERAL STENT INSERTION, RT;  Surgeon: Shashi Arciniega MD;  Location: [facility]  Service:    • HERNIA REPAIR     • KNEE ARTHROPLASTY UNICOMPARTMENTAL Left 2018    Procedure: KNEE ARTHROPLASTY UNICOMPARTMENTAL;  Surgeon: Davin Beasley MD;  Location: [facility]  Service: Orthopedics   • KNEE SURGERY      KNEE ARTHROSCOPY X3   • THYROIDECTOMY                         PT Assessment/Plan     Row Name 20 0800          PT Assessment    Assessment Comments  Continue to progress strengthening to prepare for  Reason for Visit: 11 year WCE    Anil Hinojosa is a 11 year old male who presents for well child exam. Patient presents with Father and Mother.    Concerns raised today include:   none    Interval History:  Birth history, medical history, surgical history, and family history reviewed and updated. No chronic medical conditions. No recent illnesses or injuries. No prior surgeries. Allergy to penicillinn. Daily medications include a multivitamin. He uses an inhaler (Qvar and albuterol) with illnesses. He hasn't needed recently. Family history reviewed and updated in [employer].    Diet: well balanced. Does like sugar a lot. Drinks milk. [place] water. BMI at the 99th percentile for age (tracking)    Sleeping: sleeping well    Elimination:  Normal urine and stool output. No diarrhea or constipation    Activity: occasional. Does play football and baseball    Vision/Hearing: No concerns    Dental: Brushes teeth. Sees dentist    Safety: Wears seat belt. Wears bike helmet    SOCIAL:  Primary caretakers: Parents  Lives with: Parents, siblings  Recent changes/stressors: None  School: in 4th grade. Does well. Likes school    Tobacco Use: Never             DEVELOPMENT/BEHAVIOR CONCERNS:  No developmental or behavioral concerns      PHYSICAL EXAM:  Blood pressure 118/66, pulse 72, temperature 97.2 °F (36.2 °C), temperature source Temporal Artery, height 4' 9\" (1.448 m), weight (!) 53.2 kg.  GEN:  Well appearing male child, nontoxic, no acute distress.  Alert and     interactive.  SKIN: Warm, normal turgor.  No cyanosis.  No bruises or lesions.  HEAD:  Normocephalic, atraumatic.   EYES:  Conjunctiva appear normal, non-injected, non-icteric.  NOSE:  Appears normal, no flaring.  EARS:  Normal pinnae, no preauricular skin tags or pit.  TMs are transparent with good    landmarks.  THROAT:  Oropharynx with moist mucus membranes and no lesions.  NECK:  Supple, no lymphadenopathy or masses.  HEART:  Regular rate and rhythm.  Quiet precordium.  Normal  S1, S2.  No murmurs, rubs, gallops. Equal femoral pulses.  LUNGS:  Clear to auscultation bilaterally.  No wheezes, rales, rhonchi.  Normal work of breathing.  ABD:  Soft, nontender.  No organomegaly or masses.  : normal male - testes descended bilaterally? Yes  Jaciel stage II  MSK:  Spine straight.  Normal sacrum.  EXT:  Warm, dry, without abnormalities.  NEURO:  Normal tone, bulk, strength.    ASSESSMENT:  11 year old male well child.  Normal growth and development  BMI at 99th percentile for age    PLAN:   1. All parental concerns and questions discussed.  2. Anticipatory guidance provided, including nutrition, sleep, safety, puberty and development.  3. Immunizations: parents decline flu shot.  Risks, benefits, and side effects discussed.  4. Non-fasting cholesterol screening discussed. Parents will do next year.  5. Discussed healthy diet and exercise. Watch portion sizes. Limit juice and junk food and soda. Should get 60 minutes of exercise at least 4-5 days per week.     Follow-up:  RTC in 1 year for WCE or sooner prn illness/concerns.    Leena Woodall MD  11/6/17   knee replace  -WS       User Key  (r) = Recorded By, (t) = Taken By, (c) = Cosigned By    Initials Name Provider Type    WS Jin Sloan PTA Physical Therapy Assistant            OP Exercises     Row Name 11/18/20 0729             Subjective Comments    Subjective Comments  pain only with certain movements  -WS         Subjective Pain    Able to rate subjective pain?  yes  -WS      Subjective Pain Comment  prehab TKA  -WS         Total Minutes    34163 - PT Therapeutic Exercise Minutes  40  -WS         Exercise 1    Exercise Name 1  nu step LE  -WS      Cueing 1  Verbal  -WS      Time 1  5 min  -WS         Exercise 2    Exercise Name 2  QS with towel roll  -WS      Reps 2  15  -WS      Time 2  5s  -WS         Exercise 3    Exercise Name 3  Heel slide with sheet  -WS      Cueing 3  Verbal  -WS      Reps 3  10  -WS      Time 3  10s  -WS      Additional Comments  supine  -WS         Exercise 4    Exercise Name 4  HL hip abd  -WS      Cueing 4  Verbal  -WS      Sets 4  2  -WS      Reps 4  10  -WS      Time 4  BTB  -WS         Exercise 5    Exercise Name 5  SAQ  -WS      Cueing 5  Verbal  -WS      Sets 5  2  -WS      Reps 5  10  -WS      Time 5  5#  -WS         Exercise 6    Exercise Name 6  B hamstring str step  -WS      Cueing 6  Verbal  -WS      Reps 6  3  -WS      Time 6  20s  -WS         Exercise 7    Exercise Name 7  LAQ  -WS      Cueing 7  Verbal  -WS      Sets 7  2  -WS      Reps 7  10  -WS      Time 7  5#  -WS         Exercise 8    Exercise Name 8  Knee flex on step  -WS      Cueing 8  Verbal  -WS      Reps 8  3  -WS      Time 8  20s  -WS      Additional Comments  third step  -WS         Exercise 9    Exercise Name 9  calf raise  -WS      Sets 9  2  -WS      Reps 9  10  -WS         Exercise 10    Exercise Name 10  HS curl  -WS      Sets 10  2  -WS      Reps 10  10  -WS        User Key  (r) = Recorded By, (t) = Taken By, (c) = Cosigned By    Initials Name Provider Type    WS Jin Sloan PTA Physical  Therapy Assistant                       PT OP Goals     Row Name 11/18/20 0800 11/18/20 0700       PT Short Term Goals    STG Date to Achieve  12/08/20  -WS  12/08/20  -WS    STG 1  Pt will be independent with initial HEP to improve strength/ROM and decrease pain prior to TKA.  -WS  Pt will be independent with initial HEP to improve strength/ROM and decrease pain prior to TKA.  -WS    STG 1 Progress  Met  -WS  Met  -WS    STG 2  Pt will improve R knee AROM from 2-115 deg to 0-120 deg to improve ability to navigate stairs.  -WS  Pt will improve R knee AROM from 2-115 deg to 0-120 deg to improve ability to navigate stairs.  -WS    STG 2 Progress  Ongoing  -WS  Ongoing  -WS    STG 3  Pt will improve B hip abd strength to at least 4/5.  -WS  Pt will improve B hip abd strength to at least 4/5.  -WS    STG 3 Progress  Ongoing  -WS  Ongoing  -WS       Long Term Goals    LTG Date to Achieve  01/05/21  -WS  01/05/21  -WS    LTG 1  Pt will be independent with advanced HEP to improve strength/ROM and decrease pain prior to TKA.  -WS  Pt will be independent with advanced HEP to improve strength/ROM and decrease pain prior to TKA.  -WS    LTG 1 Progress  Ongoing  -WS  Ongoing  -WS    LTG 2  Pt will be able to ascend/descend stairs in a reciprocal pattern with </=3/10 R knee pain.  -WS  Pt will be able to ascend/descend stairs in a reciprocal pattern with </=3/10 R knee pain.  -WS    LTG 2 Progress  Ongoing  -WS  Ongoing  -WS    LTG 3  Pt will report </= 3/10 pain with ADLs to improve quality of life.  -WS  Pt will report </= 3/10 pain with ADLs to improve quality of life.  -WS    LTG 3 Progress  Ongoing  -WS  Ongoing  -WS    LTG 4  Pt will improve KOS score to at least 70% to show improved quality of life.  -WS  Pt will improve KOS score to at least 70% to show improved quality of life.  -WS    LTG 4 Progress  Ongoing  -WS  Ongoing  -WS      User Key  (r) = Recorded By, (t) = Taken By, (c) = Cosigned By    Initials Name Provider  Type    WS Jin Sloan PTA Physical Therapy Assistant          Therapy Education  Given: HEP, Symptoms/condition management, Pain management  Program: Reinforced  How Provided: Verbal, Demonstration  Provided to: Patient              Time Calculation:   Start Time: 0729  Stop Time: 0809  Time Calculation (min): 40 min  Therapy Charges for Today     Code Description Service Date Service Provider Modifiers Qty    68102290538 HC PT THER PROC EA 15 MIN 11/18/2020 Jin Sloan PTA GP 3                    Jin Sloan PTA  11/18/2020

## 2020-11-20 ENCOUNTER — APPOINTMENT (OUTPATIENT)
Dept: PHYSICAL THERAPY | Facility: HOSPITAL | Age: 80
End: 2020-11-20

## 2020-11-23 ENCOUNTER — HOSPITAL ENCOUNTER (OUTPATIENT)
Dept: PHYSICAL THERAPY | Facility: HOSPITAL | Age: 80
Setting detail: THERAPIES SERIES
Discharge: HOME | End: 2020-11-23

## 2020-11-23 DIAGNOSIS — M25.561 CHRONIC PAIN OF RIGHT KNEE: Primary | ICD-10-CM

## 2020-11-23 DIAGNOSIS — G89.29 CHRONIC PAIN OF RIGHT KNEE: Primary | ICD-10-CM

## 2020-11-23 DIAGNOSIS — M25.661 DECREASED ROM OF RIGHT KNEE: ICD-10-CM

## 2020-11-23 DIAGNOSIS — R26.2 DIFFICULTY WALKING: ICD-10-CM

## 2020-11-23 PROCEDURE — 97110 THERAPEUTIC EXERCISES: CPT

## 2020-11-23 NOTE — THERAPY TREATMENT NOTE
Outpatient Physical Therapy Ortho Treatment Note  [facility]     Patient Name: Asa Magaña  : 1940  MRN: [id number]  Today's Date: 2020      Visit Date: 2020    Visit Dx:    ICD-10-CM ICD-9-CM   1. Chronic pain of right knee  M25.561 719.46    G89.29 338.29   2. Difficulty walking  R26.2 719.7   3. Decreased ROM of right knee  M25.661 719.56       Patient Active Problem List   Diagnosis   • Asthma   • Hyperlipidemia   • Lumbago   • Hypothyroidism   • Hx of migraine headaches   • BPH (benign prostatic hyperplasia)   • Pyelonephritis   • CAP (community acquired pneumonia)   • Sepsis (CMS/HCC)   • Rectal bleeding   • Primary osteoarthritis of left knee   • Vertigo   • Chronic maxillary sinusitis   • Chest pain, atypical   • Chronic pain of right knee        Past Medical History:   Diagnosis Date   • Asthma    • BPH (benign prostatic hyperplasia)    • History of kidney stones    • History of sepsis 2017    BLADDER INFECTION AND PNEUMONIA BOTH TWICE   • Hx of migraine headaches    • Hyperlipidemia    • Hypothyroidism    • Knee pain    • Skin abrasion     LEFT FOREARM THAT HAS NOW HEALED        Past Surgical History:   Procedure Laterality Date   • COLONOSCOPY  2016    benign polyp-repeat 5 years- Filemon Beasley M.D.   • CYSTOSCOPY TRANSURETHRAL RESECTION OF PROSTATE     • CYSTOSCOPY W/ URETERAL STENT PLACEMENT Right 2017    Procedure: CYSTOSCOPY, ureteroscopy,  URETERAL STENT INSERTION, RT;  Surgeon: Shashi Arciniega MD;  Location: [facility];  Service:    • HERNIA REPAIR     • KNEE ARTHROPLASTY UNICOMPARTMENTAL Left 2018    Procedure: KNEE ARTHROPLASTY UNICOMPARTMENTAL;  Surgeon: Davin Beasley MD;  Location: [facility];  Service: Orthopedics   • KNEE SURGERY      KNEE ARTHROSCOPY X3   • THYROIDECTOMY                         PT Assessment/Plan     Row Name 20 0757          PT Assessment    Assessment Comments  Patient independent with prehab right knee. Patient  discharged to strength train TIW until surgery  -WS       User Key  (r) = Recorded By, (t) = Taken By, (c) = Cosigned By    Initials Name Provider Type    Jin Hernandez PTA Physical Therapy Assistant            OP Exercises     Row Name 11/23/20 0725             Subjective Comments    Subjective Comments  noo right knee pain  -WS         Subjective Pain    Subjective Pain Comment  prehab R TKA  -WS         Total Minutes    84340 - PT Therapeutic Exercise Minutes  40  -WS         Exercise 1    Exercise Name 1  nu step LE  -WS      Cueing 1  Verbal  -WS      Time 1  5 min  -WS         Exercise 2    Exercise Name 2  QS with towel roll  -WS      Reps 2  15  -WS      Time 2  5s  -WS         Exercise 3    Exercise Name 3  Heel slide with sheet  -WS      Cueing 3  Verbal  -WS      Reps 3  10  -WS      Time 3  10s  -WS         Exercise 4    Exercise Name 4  HL hip abd  -WS      Cueing 4  Verbal  -WS      Sets 4  2  -WS      Reps 4  10  -WS      Time 4  BTB  -WS         Exercise 5    Exercise Name 5  SAQ  -WS      Cueing 5  Verbal  -WS      Sets 5  2  -WS      Reps 5  10  -WS      Time 5  5#  -WS         Exercise 6    Exercise Name 6  B hamstring str step  -WS      Cueing 6  Verbal  -WS      Reps 6  3  -WS      Time 6  20s  -WS         Exercise 7    Exercise Name 7  LAQ  -WS      Cueing 7  Verbal  -WS      Sets 7  2  -WS      Reps 7  10  -WS      Time 7  5#  -WS         Exercise 8    Exercise Name 8  Knee flex on step  -WS      Cueing 8  Verbal  -WS      Reps 8  3  -WS      Time 8  20s  -WS         Exercise 9    Exercise Name 9  calf raise  -WS      Sets 9  2  -WS      Reps 9  10  -WS         Exercise 10    Exercise Name 10  HS curl  -WS      Sets 10  2  -WS      Reps 10  10  -WS      Additional Comments  3#  -WS        User Key  (r) = Recorded By, (t) = Taken By, (c) = Cosigned By    Initials Name Provider Type    Jin Hernandez PTA Physical Therapy Assistant                       PT OP Goals     Row Name  11/23/20 0700          PT Short Term Goals    STG Date to Achieve  12/08/20  -     STG 1  Pt will be independent with initial HEP to improve strength/ROM and decrease pain prior to TKA.  -     STG 1 Progress  Met  -     STG 2  Pt will improve R knee AROM from 2-115 deg to 0-120 deg to improve ability to navigate stairs.  -WS     STG 2 Progress  Met  -     STG 3  Pt will improve B hip abd strength to at least 4/5.  -     STG 3 Progress  Met  -        Long Term Goals    LTG Date to Achieve  01/05/21  -     LTG 1  Pt will be independent with advanced HEP to improve strength/ROM and decrease pain prior to TKA.  -     LTG 1 Progress  Met  -     LTG 2  Pt will be able to ascend/descend stairs in a reciprocal pattern with </=3/10 R knee pain.  -WS     LTG 2 Progress  Ongoing;Met  -WS     LTG 3  Pt will report </= 3/10 pain with ADLs to improve quality of life.  -     LTG 3 Progress  Met  -     LTG 4  Pt will improve KOS score to at least 70% to show improved quality of life.  -       User Key  (r) = Recorded By, (t) = Taken By, (c) = Cosigned By    Initials Name Provider Type    Jin Hernandez PTA Physical Therapy Assistant          Therapy Education  Given: HEP, Symptoms/condition management, Pain management, Posture/body mechanics  Program: Reinforced  How Provided: Verbal  Provided to: Patient  Level of Understanding: Teach back education performed              Time Calculation:   Start Time: 0725  Stop Time: 0805  Time Calculation (min): 40 min  Therapy Charges for Today     Code Description Service Date Service Provider Modifiers Qty    94181585853 HC PT THER PROC EA 15 MIN 11/23/2020 Jin Sloan PTA GP 3                    Jin Sloan PTA  11/23/2020

## 2021-01-14 ENCOUNTER — APPOINTMENT (OUTPATIENT)
Dept: PREADMISSION TESTING | Facility: HOSPITAL | Age: 81
End: 2021-01-14

## 2021-03-22 ENCOUNTER — TRANSCRIBE ORDERS (OUTPATIENT)
Dept: PREADMISSION TESTING | Facility: HOSPITAL | Age: 81
End: 2021-03-22

## 2021-03-22 DIAGNOSIS — Z01.818 OTHER SPECIFIED PRE-OPERATIVE EXAMINATION: Primary | ICD-10-CM

## 2021-03-23 ENCOUNTER — TELEPHONE (OUTPATIENT)
Dept: ORTHOPEDIC SURGERY | Facility: CLINIC | Age: 81
End: 2021-03-23

## 2021-03-23 ENCOUNTER — PREP FOR SURGERY (OUTPATIENT)
Dept: OTHER | Facility: HOSPITAL | Age: 81
End: 2021-03-23

## 2021-03-23 DIAGNOSIS — M25.561 CHRONIC PAIN OF RIGHT KNEE: Primary | ICD-10-CM

## 2021-03-23 DIAGNOSIS — G89.29 CHRONIC PAIN OF RIGHT KNEE: Primary | ICD-10-CM

## 2021-03-23 NOTE — TELEPHONE ENCOUNTER
Caller: ALEX VASQUEZ   Relationship to Patient: SELF    Phone Number: [phone]  Reason for Call: PATIENT IS CALLING BECAUSE HE FELL AND CRACKED   HIS ELBOW ON Saturday AND IS SUPPOSED TO HAVE HIS KNEE SX ON 04/05/21 AND PATIENT STATES HE IS FINE BUT WANTS TO MAKE SURE THAT HE CAN STILL HAVE HIS SURGERY

## 2021-03-25 ENCOUNTER — APPOINTMENT (OUTPATIENT)
Dept: PREADMISSION TESTING | Facility: HOSPITAL | Age: 81
End: 2021-03-25

## 2021-03-25 ENCOUNTER — OFFICE VISIT (OUTPATIENT)
Dept: ORTHOPEDIC SURGERY | Facility: CLINIC | Age: 81
End: 2021-03-25

## 2021-03-25 VITALS — HEIGHT: 72 IN | WEIGHT: 207 LBS | BODY MASS INDEX: 28.04 KG/M2 | TEMPERATURE: 97.8 F

## 2021-03-25 VITALS
OXYGEN SATURATION: 96 % | HEART RATE: 68 BPM | SYSTOLIC BLOOD PRESSURE: 148 MMHG | WEIGHT: 204 LBS | DIASTOLIC BLOOD PRESSURE: 77 MMHG | RESPIRATION RATE: 20 BRPM | TEMPERATURE: 97.1 F | HEIGHT: 72 IN | BODY MASS INDEX: 27.63 KG/M2

## 2021-03-25 DIAGNOSIS — G89.29 CHRONIC PAIN OF RIGHT KNEE: ICD-10-CM

## 2021-03-25 DIAGNOSIS — M25.561 CHRONIC PAIN OF RIGHT KNEE: ICD-10-CM

## 2021-03-25 DIAGNOSIS — M17.11 PRIMARY OSTEOARTHRITIS OF RIGHT KNEE: Primary | ICD-10-CM

## 2021-03-25 LAB
ANION GAP SERPL CALCULATED.3IONS-SCNC: 7.5 MMOL/L (ref 5–15)
BILIRUB UR QL STRIP: NEGATIVE
BUN SERPL-MCNC: 20 MG/DL (ref 8–23)
BUN/CREAT SERPL: 15.5 (ref 7–25)
CALCIUM SPEC-SCNC: 8.2 MG/DL (ref 8.6–10.5)
CHLORIDE SERPL-SCNC: 107 MMOL/L (ref 98–107)
CLARITY UR: CLEAR
CO2 SERPL-SCNC: 26.5 MMOL/L (ref 22–29)
COLOR UR: YELLOW
CREAT SERPL-MCNC: 1.29 MG/DL (ref 0.76–1.27)
DEPRECATED RDW RBC AUTO: 42.6 FL (ref 37–54)
ERYTHROCYTE [DISTWIDTH] IN BLOOD BY AUTOMATED COUNT: 12.1 % (ref 12.3–15.4)
GFR SERPL CREATININE-BSD FRML MDRD: 54 ML/MIN/1.73
GLUCOSE SERPL-MCNC: 81 MG/DL (ref 65–99)
GLUCOSE UR STRIP-MCNC: NEGATIVE MG/DL
HCT VFR BLD AUTO: 46.6 % (ref 37.5–51)
HGB BLD-MCNC: 15.6 G/DL (ref 13–17.7)
HGB UR QL STRIP.AUTO: NEGATIVE
KETONES UR QL STRIP: NEGATIVE
LEUKOCYTE ESTERASE UR QL STRIP.AUTO: NEGATIVE
MCH RBC QN AUTO: 31.9 PG (ref 26.6–33)
MCHC RBC AUTO-ENTMCNC: 33.5 G/DL (ref 31.5–35.7)
MCV RBC AUTO: 95.3 FL (ref 79–97)
NITRITE UR QL STRIP: NEGATIVE
PH UR STRIP.AUTO: 5.5 [PH] (ref 5–8)
PLATELET # BLD AUTO: 235 10*3/MM3 (ref 140–450)
PMV BLD AUTO: 10.1 FL (ref 6–12)
POTASSIUM SERPL-SCNC: 4.6 MMOL/L (ref 3.5–5.2)
PROT UR QL STRIP: NEGATIVE
QT INTERVAL: 408 MS
RBC # BLD AUTO: 4.89 10*6/MM3 (ref 4.14–5.8)
SODIUM SERPL-SCNC: 141 MMOL/L (ref 136–145)
SP GR UR STRIP: 1.02 (ref 1–1.03)
UROBILINOGEN UR QL STRIP: NORMAL
WBC # BLD AUTO: 5.88 10*3/MM3 (ref 3.4–10.8)

## 2021-03-25 PROCEDURE — 80048 BASIC METABOLIC PNL TOTAL CA: CPT

## 2021-03-25 PROCEDURE — 85027 COMPLETE CBC AUTOMATED: CPT

## 2021-03-25 PROCEDURE — 93005 ELECTROCARDIOGRAM TRACING: CPT

## 2021-03-25 PROCEDURE — 93010 ELECTROCARDIOGRAM REPORT: CPT | Performed by: INTERNAL MEDICINE

## 2021-03-25 PROCEDURE — 77077 JOINT SURVEY SINGLE VIEW: CPT | Performed by: ORTHOPAEDIC SURGERY

## 2021-03-25 PROCEDURE — 36415 COLL VENOUS BLD VENIPUNCTURE: CPT

## 2021-03-25 PROCEDURE — S0260 H&P FOR SURGERY: HCPCS | Performed by: NURSE PRACTITIONER

## 2021-03-25 PROCEDURE — 81003 URINALYSIS AUTO W/O SCOPE: CPT

## 2021-03-25 RX ORDER — OMEPRAZOLE 40 MG/1
40 CAPSULE, DELAYED RELEASE ORAL 2 TIMES DAILY PRN
COMMUNITY
Start: 2021-03-18 | End: 2021-04-06 | Stop reason: HOSPADM

## 2021-03-25 ASSESSMENT — KOOS JR
KOOS JR SCORE: 52.465
KOOS JR SCORE: 14

## 2021-03-25 NOTE — DISCHARGE INSTRUCTIONS
Take the following medications the morning of surgery:  OMEPRAZOLE, LEVOTHYROXINE    [facility]  DR. ALMANZA'S OFFICE WILL GIVE YOU ARRIVAL TIME      If you are on prescription narcotic pain medication to control your pain you may also take that medication the morning of surgery.    General Instructions:  • Do not eat solid food after midnight the night before surgery.  • You may drink clear liquids day of surgery but must stop at least one hour before your hospital arrival time.  • It is beneficial for you to have a clear drink that contains carbohydrates the day of surgery.  We suggest a 12 to 20 ounce bottle of Gatorade or Powerade for non-diabetic patients or a 12 to 20 ounce bottle of G2 or Powerade Zero for diabetic patients. (Pediatric patients, are not advised to drink a 12 to 20 ounce carbohydrate drink)    Clear liquids are liquids you can see through.  Nothing red in color.     Plain water                               Sports drinks  Sodas                                   Gelatin (Jell-O)  Fruit juices without pulp such as white grape juice and apple juice  Popsicles that contain no fruit or yogurt  Tea or coffee (no cream or milk added)  Gatorade / Powerade  G2 / Powerade Zero    • Infants may have breast milk up to four hours before surgery.  • Infants drinking formula may drink formula up to six hours before surgery.   • Patients who avoid smoking, chewing tobacco and alcohol for 4 weeks prior to surgery have a reduced risk of post-operative complications.  Quit smoking as many days before surgery as you can.  • Do not smoke, use chewing tobacco or drink alcohol the day of surgery.   • If applicable bring your C-PAP/ BI-PAP machine.  • Bring any papers given to you in the doctor’s office.  • Wear clean comfortable clothes.  • Do not wear contact lenses, false eyelashes or make-up.  Bring a case for your glasses.   • Bring crutches or walker if applicable.  • Remove all piercings.   Leave jewelry and any other valuables at home.  • Hair extensions with metal clips must be removed prior to surgery.  • The Pre-Admission Testing nurse will instruct you to bring medications if unable to obtain an accurate list in Pre-Admission Testing.          Preventing a Surgical Site Infection:  • For 2 to 3 days before surgery, avoid shaving with a razor because the razor can irritate skin and make it easier to develop an infection.    • Any areas of open skin can increase the risk of a post-operative wound infection by allowing bacteria to enter and travel throughout the body.  Notify your surgeon if you have any skin wounds / rashes even if it is not near the expected surgical site.  The area will need assessed to determine if surgery should be delayed until it is healed.  • The night prior to surgery shower using a fresh bar of anti-bacterial soap (such as Dial) and clean washcloth.  Sleep in a clean bed with clean clothing.  Do not allow pets to sleep with you.  • Shower on the morning of surgery using a fresh bar of anti-bacterial soap (such as Dial) and clean washcloth.  Dry with a clean towel and dress in clean clothing.  • Ask your surgeon if you will be receiving antibiotics prior to surgery.  • Make sure you, your family, and all healthcare providers clean their hands with soap and water or an alcohol based hand  before caring for you or your wound.    Day of surgery:  Your arrival time is approximately two hours before your scheduled surgery time.  Upon arrival, a Pre-op nurse and Anesthesiologist will review your health history, obtain vital signs, and answer questions you may have.  The only belongings needed at this time will be a list of your home medications and if applicable your C-PAP/BI-PAP machine.  A Pre-op nurse will start an IV and you may receive medication in preparation for surgery, including something to help you relax.     Please be aware that surgery does come with  discomfort.  We want to make every effort to control your discomfort so please discuss any uncontrolled symptoms with your nurse.   Your doctor will most likely have prescribed pain medications.      If you are going home after surgery you will receive individualized written care instructions before being discharged.  A responsible adult must drive you to and from the hospital on the day of your surgery and stay with you for 24 hours.  Discharge prescriptions can be filled by the hospital pharmacy during regular pharmacy hours.  If you are having surgery late in the day/evening your prescription may be e-prescribed to your pharmacy.  Please verify your pharmacy hours or chose a 24 hour pharmacy to avoid not having access to your prescription because your pharmacy has closed for the day.    If you are staying overnight following surgery, you will be transported to your hospital room following the recovery period.  [facility] has all private rooms.    If you have any questions please call Pre-Admission Testing at [phone].  Deductibles and co-payments are collected on the day of service. Please be prepared to pay the required co-pay, deductible or deposit on the day of service as defined by your plan.    Patient Education for Self-Quarantine Process    Following your COVID testing, we strongly recommend that you do not leave your home after you have been tested for COVID except to get medical care. This includes not going to work, school or to public areas.  If this is not possible for you to do please limit your activities to only required outings.  Be sure to wear a mask when you are with other people, practice social distancing and wash your hands frequently.      The following items provide additional details to keep you safe.  • Wash your hands with soap and water frequently for at least 20 seconds.   • Avoid touching your eyes, nose and mouth with unwashed hands.  • Do not share anything -  utensils, towels, food from the same bowl.   • Have your own utensils, drinking glass, dishes, towels and bedding.   • Do not have visitors.   • Do use FaceTime to stay in touch with family and friends.  • You should stay in a specific room away from others if possible.   • Stay at least 6 feet away from others in the home if you cannot have a dedicated room to yourself.   • Do not snuggle with your pet. While the CDC says there is no evidence that pets can spread COVID-19 or be infected from humans, it is probably best to avoid “petting, snuggling, being kissed or licked and sharing food (during self-quarantine)”, according to the CDC.   • Sanitize household surfaces daily. Include all high touch areas (door handles, light switches, phones, countertops, etc.)  • Do not share a bathroom with others, if possible.   • Wear a mask around others in your home if you are unable to stay in a separate room or 6 feet apart. If  you are unable to wear a mask, have your family member wear a mask if they must be within 6 feet of you.   Call your surgeon immediately if you experience any of the following symptoms:  • Sore Throat  • Shortness of Breath or difficulty breathing  • Cough  • Chills  • Body soreness or muscle pain  • Headache  • Fever  • New loss of taste or smell  • Do not arrive for your surgery ill.  Your procedure will need to be rescheduled to another time.  You will need to call your physician before the day of surgery to avoid any unnecessary exposure to hospital staff as well as other patients.    CHLORHEXIDINE CLOTH INSTRUCTIONS  The morning of surgery follow these instructions using the Chlorhexidine cloths you've been given.  These steps reduce bacteria on the body.  Do not use the cloths near your eyes, ears mouth, genitalia or on open wounds.  Throw the cloths away after use but do not try to flush them down a toilet.      • Open and remove one cloth at a time from the package.    • Leave the cloth  unfolded and begin the bathing.  • Massage the skin with the cloths using gentle pressure to remove bacteria.  Do not scrub harshly.   • Follow the steps below with one 2% CHG cloth per area (6 total cloths).  • One cloth for neck, shoulders and chest.  • One cloth for both arms, hands, fingers and underarms (do underarms last).  • One cloth for the abdomen followed by groin.  • One cloth for right leg and foot including between the toes.  • One cloth for left leg and foot including between the toes.  • The last cloth is to be used for the back of the neck, back and buttocks.    Allow the CHG to air dry 3 minutes on the skin which will give it time to work and decrease the chance of irritation.  The skin may feel sticky until it is dry.  Do not rinse with water or any other liquid or you will lose the beneficial effects of the CHG.  If mild skin irritation occurs, do rinse the skin to remove the CHG.  Report this to the nurse at time of admission.  Do not apply lotions, creams, ointments, deodorants or perfumes after using the clothes. Dress in clean clothes before coming to the hospital.    BACTROBAN NASAL OINTMENT  There are many germs normally in your nose. Bactroban is an ointment that will help reduce these germs. Please follow these instructions for Bactroban use:      ____The day before surgery in the morning  Date________    ____The day before surgery in the evening              Date________    ____The day of surgery in the morning    Date________    **Squirt ½ package of Bactroban Ointment onto a cotton applicator and apply to inside of 1st nostril.  Squirt the remaining Bactroban and apply to the inside of the other nostril.

## 2021-03-25 NOTE — H&P
Patient: Asa Magaña    YOB: 1940    Medical Record Number: [id number]    Admitting Physician: Dr. Davin Beasley    Reason for Admission: End Stage Right Knee OA    History of Present Illness: 80 y.o. male presents with severe end stage knee osteoarthritis which has not been responsive to the full compliment of conservative measures. Despite conservative attempts, there is still severe, constant activity limiting pain. Given the severity of the pain, the patient has elected to proceed with knee replacement.    Allergies:   Allergies   Allergen Reactions   • Ciprofloxacin Hives and Palpitations   • Quinolones Hives and Palpitations   • Terazosin Itching and Rash         Current Medications:  Home Medications:    Current Outpatient Medications on File Prior to Visit   Medication Sig   • Chlorhexidine Gluconate 2 % pads Apply  topically. AS DIRECTED PAT   • Cholecalciferol (Vitamin D3) 25 MCG (1000 UT) capsule Take 1,000 Units by mouth Daily. HOLD FOR SURGERY   • finasteride (PROSCAR) 5 MG tablet Take 5 mg by mouth Every Night.   • fluticasone (VERAMYST) 27.5 MCG/SPRAY nasal spray 2 sprays into the nostril(s) as directed by provider As Needed.   • levothyroxine (SYNTHROID, LEVOTHROID) 75 MCG tablet Take 75 mcg by mouth Every Morning.   • meclizine (ANTIVERT) 25 MG tablet Take 1 tablet by mouth 3 (Three) Times a Day As Needed for Dizziness.   • mupirocin (BACTROBAN) 2 % ointment Apply  topically to the appropriate area as directed 3 (Three) Times a Day. AS DIRECTED PAT   • omeprazole (priLOSEC) 40 MG capsule Take 40 mg by mouth 2 (Two) Times a Day.   • rosuvastatin (CRESTOR) 5 MG tablet Take 5 mg by mouth Every Night.   • [DISCONTINUED] guaiFENesin (MUCINEX) 600 MG 12 hr tablet Take 1 tablet by mouth Every 12 (Twelve) Hours.   • [DISCONTINUED] SUMAtriptan (IMITREX) 50 MG tablet Take one tablet at onset of headache. May repeat dose one time in 2 hours if headache not relieved. (Patient taking differently:  Take 50 mg by mouth 1 (One) Time As Needed. Take one tablet at onset of headache. May repeat dose one time in 2 hours if headache not relieved.)     Current Facility-Administered Medications on File Prior to Visit   Medication   • Chlorhexidine Gluconate 2 % pads 1 each   • mupirocin (BACTROBAN) 2 % nasal ointment   • mupirocin (BACTROBAN) 2 % nasal ointment     PRN Meds:.    PMH:     Past Medical History:   Diagnosis Date   • Asthma    • BPH (benign prostatic hyperplasia)    • COPD (chronic obstructive pulmonary disease) (CMS/[facility])    • COVID-19 vaccine series completed     LAST DOSE GIVEN MARCH 1,2021   • GERD (gastroesophageal reflux disease)    • History of kidney stones    • History of sepsis 02/2017    BLADDER INFECTION AND PNEUMONIA BOTH TWICE   • Hx of migraine headaches    • Hyperlipidemia    • Hypothyroidism    • Knee pain     RIGHT   • Unsteady gait     D/T RIGHT KNEE   • Weakness     RIGHT KNEE/RIGHT LEG       PF/Surg/Soc Hx:     Past Surgical History:   Procedure Laterality Date   • COLONOSCOPY  2016    benign polyp-repeat 5 years- Filemon Beasley M.D.   • CYSTOSCOPY TRANSURETHRAL RESECTION OF PROSTATE     • CYSTOSCOPY W/ URETERAL STENT PLACEMENT Right 1/20/2017    Procedure: CYSTOSCOPY, ureteroscopy,  URETERAL STENT INSERTION, RT;  Surgeon: Shashi Arciniega MD;  Location: [facility];  Service:    • HERNIA REPAIR Right    • KNEE ARTHROPLASTY UNICOMPARTMENTAL Left 9/28/2018    Procedure: KNEE ARTHROPLASTY UNICOMPARTMENTAL;  Surgeon: Davin Beasley MD;  Location: [facility];  Service: Orthopedics   • KNEE SURGERY Bilateral     KNEE ARTHROSCOPY X3   • THYROIDECTOMY          Social History     Occupational History   • Not on file   Tobacco Use   • Smoking status: Never Smoker   • Smokeless tobacco: Never Used   Vaping Use   • Vaping Use: Never used   Substance and Sexual Activity   • Alcohol use: Yes     Comment: seldom   • Drug use: No   • Sexual activity: Defer      Social History     Social  "History Narrative   • Not on file        Family History   Problem Relation Age of Onset   • Diabetes Mother    • Heart disease Mother    • Diabetes Father    • Heart disease Father    • Malig Hyperthermia Neg Hx          Review of Systems:   A 14 point review of systems was performed, pertinent positives discussed above, all other systems are negative    Physical Exam: 80 y.o. male  Vital Signs :   Vitals:    03/25/21 1321   Temp: 97.8 °F (36.6 °C)   Weight: 93.9 kg (207 lb)   Height: 182.9 cm (72\")   PainSc:   5     General: Alert and Oriented x 3, No acute distress.  Psych: mood and affect appropriate; recent and remote memory intact  Eyes: conjunctiva clear; pupils equally round and reactive, sclera nonicteric  CV: no peripheral edema  Resp: normal respiratory effort  Skin: no rashes or wounds; normal turgor  Musculosketetal; pain and crepitance with knee range of motion  Vascular: palpable distal pulses    Xrays:  -3 views (AP, lateral, and sunrise) were reviewed demonstrating end-stage OA with bone on bone articulation.  -A full length AP xray was ordered and reviewed today for purposes of operative alignment demonstrating end stage arthritic findings. There are no previous full length films for review    Assessment:  End-stage Right knee osteoarthritis. Conservative measures have failed.      Plan:  The plan is to proceed with Right Total Knee Replacement. The patient voiced understanding of the risks, benefits, and alternative forms of treatment that were discussed with Dr Beasley at the time of scheduling. 23 HH, creatinine elevated so no anti-inflammatories    Fe Coker, APRN  3/25/2021        "

## 2021-04-02 ENCOUNTER — LAB (OUTPATIENT)
Dept: LAB | Facility: HOSPITAL | Age: 81
End: 2021-04-02

## 2021-04-02 DIAGNOSIS — Z01.818 OTHER SPECIFIED PRE-OPERATIVE EXAMINATION: ICD-10-CM

## 2021-04-02 PROCEDURE — C9803 HOPD COVID-19 SPEC COLLECT: HCPCS

## 2021-04-02 PROCEDURE — U0004 COV-19 TEST NON-CDC HGH THRU: HCPCS

## 2021-04-03 LAB — SARS-COV-2 ORF1AB RESP QL NAA+PROBE: NOT DETECTED

## 2021-04-05 ENCOUNTER — ANESTHESIA (OUTPATIENT)
Dept: PERIOP | Facility: HOSPITAL | Age: 81
End: 2021-04-05

## 2021-04-05 ENCOUNTER — APPOINTMENT (OUTPATIENT)
Dept: GENERAL RADIOLOGY | Facility: HOSPITAL | Age: 81
End: 2021-04-05

## 2021-04-05 ENCOUNTER — ANESTHESIA EVENT (OUTPATIENT)
Dept: PERIOP | Facility: HOSPITAL | Age: 81
End: 2021-04-05

## 2021-04-05 ENCOUNTER — HOSPITAL ENCOUNTER (OUTPATIENT)
Facility: HOSPITAL | Age: 81
Discharge: HOME HEALTH | End: 2021-04-06
Attending: ORTHOPAEDIC SURGERY | Admitting: ORTHOPAEDIC SURGERY

## 2021-04-05 DIAGNOSIS — M25.561 CHRONIC PAIN OF RIGHT KNEE: ICD-10-CM

## 2021-04-05 DIAGNOSIS — Z96.651 S/P TKR (TOTAL KNEE REPLACEMENT), RIGHT: Primary | ICD-10-CM

## 2021-04-05 DIAGNOSIS — G89.29 CHRONIC PAIN OF RIGHT KNEE: ICD-10-CM

## 2021-04-05 PROCEDURE — 63710000001 MUPIROCIN 2 % OINTMENT: Performed by: NURSE PRACTITIONER

## 2021-04-05 PROCEDURE — C1889 IMPLANT/INSERT DEVICE, NOC: HCPCS | Performed by: ORTHOPAEDIC SURGERY

## 2021-04-05 PROCEDURE — 63710000001 POLYETHYLENE GLYCOL 17 G PACK: Performed by: NURSE PRACTITIONER

## 2021-04-05 PROCEDURE — A9270 NON-COVERED ITEM OR SERVICE: HCPCS | Performed by: NURSE PRACTITIONER

## 2021-04-05 PROCEDURE — 25010000003 BUPIVACAINE LIPOSOME 1.3 % SUSPENSION 20 ML VIAL: Performed by: ORTHOPAEDIC SURGERY

## 2021-04-05 PROCEDURE — 25010000002 HYDROMORPHONE PER 4 MG: Performed by: NURSE ANESTHETIST, CERTIFIED REGISTERED

## 2021-04-05 PROCEDURE — 25010000003 CEFAZOLIN IN DEXTROSE 2-4 GM/100ML-% SOLUTION: Performed by: NURSE PRACTITIONER

## 2021-04-05 PROCEDURE — C1713 ANCHOR/SCREW BN/BN,TIS/BN: HCPCS | Performed by: ORTHOPAEDIC SURGERY

## 2021-04-05 PROCEDURE — C9290 INJ, BUPIVACAINE LIPOSOME: HCPCS | Performed by: ORTHOPAEDIC SURGERY

## 2021-04-05 PROCEDURE — 25010000002 DEXAMETHASONE PER 1 MG: Performed by: NURSE ANESTHETIST, CERTIFIED REGISTERED

## 2021-04-05 PROCEDURE — 63710000001 HYDROCODONE-ACETAMINOPHEN 7.5-325 MG TABLET: Performed by: NURSE PRACTITIONER

## 2021-04-05 PROCEDURE — 63710000001 PANTOPRAZOLE 40 MG TABLET DELAYED-RELEASE: Performed by: NURSE PRACTITIONER

## 2021-04-05 PROCEDURE — 73560 X-RAY EXAM OF KNEE 1 OR 2: CPT

## 2021-04-05 PROCEDURE — 25010000002 VANCOMYCIN 10 G RECONSTITUTED SOLUTION: Performed by: NURSE PRACTITIONER

## 2021-04-05 PROCEDURE — 27447 TOTAL KNEE ARTHROPLASTY: CPT | Performed by: NURSE PRACTITIONER

## 2021-04-05 PROCEDURE — 25010000002 PHENYLEPHRINE PER 1 ML: Performed by: NURSE ANESTHETIST, CERTIFIED REGISTERED

## 2021-04-05 PROCEDURE — 97162 PT EVAL MOD COMPLEX 30 MIN: CPT

## 2021-04-05 PROCEDURE — 97110 THERAPEUTIC EXERCISES: CPT

## 2021-04-05 PROCEDURE — 27447 TOTAL KNEE ARTHROPLASTY: CPT | Performed by: ORTHOPAEDIC SURGERY

## 2021-04-05 PROCEDURE — 63710000001 FINASTERIDE 5 MG TABLET: Performed by: NURSE PRACTITIONER

## 2021-04-05 PROCEDURE — 25010000002 FENTANYL CITRATE (PF) 100 MCG/2ML SOLUTION: Performed by: NURSE ANESTHETIST, CERTIFIED REGISTERED

## 2021-04-05 PROCEDURE — C1776 JOINT DEVICE (IMPLANTABLE): HCPCS | Performed by: ORTHOPAEDIC SURGERY

## 2021-04-05 PROCEDURE — 25010000002 PROPOFOL 10 MG/ML EMULSION: Performed by: NURSE ANESTHETIST, CERTIFIED REGISTERED

## 2021-04-05 DEVICE — GENESIS II NON-POROUS TIBIAL                                    BASEPLATE SIZE 6 RIGHT
Type: IMPLANTABLE DEVICE | Site: KNEE | Status: FUNCTIONAL
Brand: GENESIS II

## 2021-04-05 DEVICE — LEGION CR HIGH FLEX XLPE SZ 5-6 9MM
Type: IMPLANTABLE DEVICE | Site: KNEE | Status: FUNCTIONAL
Brand: LEGION

## 2021-04-05 DEVICE — IMPLANTABLE DEVICE: Type: IMPLANTABLE DEVICE | Site: KNEE | Status: FUNCTIONAL

## 2021-04-05 DEVICE — DEV CONTRL TISS STRATAFIX SPIRAL MNCRYL UD 3/0 PLS 30CM: Type: IMPLANTABLE DEVICE | Site: KNEE | Status: FUNCTIONAL

## 2021-04-05 DEVICE — LEGION CRUCIATE RETAINING OXINIUM                                    FEMORAL SIZE 6 RIGHT
Type: IMPLANTABLE DEVICE | Site: KNEE | Status: FUNCTIONAL
Brand: LEGION

## 2021-04-05 DEVICE — CMT BONE PALACOS R HI/VISC 1X40: Type: IMPLANTABLE DEVICE | Site: KNEE | Status: FUNCTIONAL

## 2021-04-05 DEVICE — GENESIS II BICONVEX PATELLA 29MM
Type: IMPLANTABLE DEVICE | Site: KNEE | Status: FUNCTIONAL
Brand: GENESIS II

## 2021-04-05 DEVICE — DEV CONTRL TISS STRATAFIX SYMM PDS PLUS VIL CT-1 60CM: Type: IMPLANTABLE DEVICE | Site: KNEE | Status: FUNCTIONAL

## 2021-04-05 RX ORDER — BUPIVACAINE HYDROCHLORIDE 7.5 MG/ML
INJECTION, SOLUTION EPIDURAL; RETROBULBAR
Status: COMPLETED | OUTPATIENT
Start: 2021-04-05 | End: 2021-04-05

## 2021-04-05 RX ORDER — TRANEXAMIC ACID 100 MG/ML
INJECTION, SOLUTION INTRAVENOUS AS NEEDED
Status: DISCONTINUED | OUTPATIENT
Start: 2021-04-05 | End: 2021-04-05 | Stop reason: SURG

## 2021-04-05 RX ORDER — MAGNESIUM HYDROXIDE 1200 MG/15ML
LIQUID ORAL AS NEEDED
Status: DISCONTINUED | OUTPATIENT
Start: 2021-04-05 | End: 2021-04-05 | Stop reason: HOSPADM

## 2021-04-05 RX ORDER — OXYCODONE AND ACETAMINOPHEN 7.5; 325 MG/1; MG/1
1 TABLET ORAL ONCE AS NEEDED
Status: DISCONTINUED | OUTPATIENT
Start: 2021-04-05 | End: 2021-04-05 | Stop reason: HOSPADM

## 2021-04-05 RX ORDER — ASPIRIN 81 MG/1
81 TABLET ORAL EVERY 12 HOURS SCHEDULED
Status: DISCONTINUED | OUTPATIENT
Start: 2021-04-06 | End: 2021-04-06 | Stop reason: HOSPADM

## 2021-04-05 RX ORDER — LIDOCAINE HYDROCHLORIDE 10 MG/ML
0.5 INJECTION, SOLUTION EPIDURAL; INFILTRATION; INTRACAUDAL; PERINEURAL ONCE AS NEEDED
Status: DISCONTINUED | OUTPATIENT
Start: 2021-04-05 | End: 2021-04-05 | Stop reason: HOSPADM

## 2021-04-05 RX ORDER — UREA 10 %
3 LOTION (ML) TOPICAL NIGHTLY PRN
Status: DISCONTINUED | OUTPATIENT
Start: 2021-04-05 | End: 2021-04-06 | Stop reason: HOSPADM

## 2021-04-05 RX ORDER — NALOXONE HCL 0.4 MG/ML
0.2 VIAL (ML) INJECTION AS NEEDED
Status: DISCONTINUED | OUTPATIENT
Start: 2021-04-05 | End: 2021-04-05 | Stop reason: HOSPADM

## 2021-04-05 RX ORDER — EPHEDRINE SULFATE 50 MG/ML
5 INJECTION, SOLUTION INTRAVENOUS ONCE AS NEEDED
Status: DISCONTINUED | OUTPATIENT
Start: 2021-04-05 | End: 2021-04-05 | Stop reason: HOSPADM

## 2021-04-05 RX ORDER — POLYETHYLENE GLYCOL 3350 17 G/17G
17 POWDER, FOR SOLUTION ORAL 2 TIMES DAILY
Status: DISCONTINUED | OUTPATIENT
Start: 2021-04-05 | End: 2021-04-06 | Stop reason: HOSPADM

## 2021-04-05 RX ORDER — PROPOFOL 10 MG/ML
VIAL (ML) INTRAVENOUS CONTINUOUS PRN
Status: DISCONTINUED | OUTPATIENT
Start: 2021-04-05 | End: 2021-04-05 | Stop reason: SURG

## 2021-04-05 RX ORDER — POLYETHYLENE GLYCOL 3350 17 G/17G
17 POWDER, FOR SOLUTION ORAL 2 TIMES DAILY
Qty: 255 G | Refills: 0 | Status: SHIPPED | OUTPATIENT
Start: 2021-04-05 | End: 2021-04-12

## 2021-04-05 RX ORDER — MELOXICAM 15 MG/1
15 TABLET ORAL ONCE
Status: DISCONTINUED | OUTPATIENT
Start: 2021-04-05 | End: 2021-04-05 | Stop reason: HOSPADM

## 2021-04-05 RX ORDER — CEFAZOLIN SODIUM 2 G/100ML
2 INJECTION, SOLUTION INTRAVENOUS ONCE
Status: COMPLETED | OUTPATIENT
Start: 2021-04-05 | End: 2021-04-05

## 2021-04-05 RX ORDER — PREGABALIN 75 MG/1
150 CAPSULE ORAL ONCE
Status: COMPLETED | OUTPATIENT
Start: 2021-04-05 | End: 2021-04-05

## 2021-04-05 RX ORDER — SODIUM CHLORIDE 0.9 % (FLUSH) 0.9 %
3 SYRINGE (ML) INJECTION EVERY 12 HOURS SCHEDULED
Status: DISCONTINUED | OUTPATIENT
Start: 2021-04-05 | End: 2021-04-05 | Stop reason: HOSPADM

## 2021-04-05 RX ORDER — LIDOCAINE HYDROCHLORIDE 20 MG/ML
INJECTION, SOLUTION INFILTRATION; PERINEURAL AS NEEDED
Status: DISCONTINUED | OUTPATIENT
Start: 2021-04-05 | End: 2021-04-05 | Stop reason: SURG

## 2021-04-05 RX ORDER — ONDANSETRON 2 MG/ML
4 INJECTION INTRAMUSCULAR; INTRAVENOUS EVERY 6 HOURS PRN
Status: DISCONTINUED | OUTPATIENT
Start: 2021-04-05 | End: 2021-04-06 | Stop reason: HOSPADM

## 2021-04-05 RX ORDER — PANTOPRAZOLE SODIUM 40 MG/1
40 TABLET, DELAYED RELEASE ORAL EVERY MORNING
Status: DISCONTINUED | OUTPATIENT
Start: 2021-04-05 | End: 2021-04-06 | Stop reason: HOSPADM

## 2021-04-05 RX ORDER — DIPHENHYDRAMINE HCL 25 MG
25 CAPSULE ORAL
Status: DISCONTINUED | OUTPATIENT
Start: 2021-04-05 | End: 2021-04-05 | Stop reason: HOSPADM

## 2021-04-05 RX ORDER — ACETAMINOPHEN 325 MG/1
650 TABLET ORAL EVERY 6 HOURS PRN
COMMUNITY

## 2021-04-05 RX ORDER — HYDROCODONE BITARTRATE AND ACETAMINOPHEN 7.5; 325 MG/1; MG/1
1 TABLET ORAL ONCE AS NEEDED
Status: DISCONTINUED | OUTPATIENT
Start: 2021-04-05 | End: 2021-04-05 | Stop reason: HOSPADM

## 2021-04-05 RX ORDER — DIPHENHYDRAMINE HYDROCHLORIDE 50 MG/ML
12.5 INJECTION INTRAMUSCULAR; INTRAVENOUS
Status: DISCONTINUED | OUTPATIENT
Start: 2021-04-05 | End: 2021-04-05 | Stop reason: HOSPADM

## 2021-04-05 RX ORDER — HYDROCODONE BITARTRATE AND ACETAMINOPHEN 7.5; 325 MG/1; MG/1
TABLET ORAL
Qty: 42 TABLET | Refills: 0 | Status: SHIPPED | OUTPATIENT
Start: 2021-04-05 | End: 2021-04-20 | Stop reason: SDUPTHER

## 2021-04-05 RX ORDER — PROMETHAZINE HYDROCHLORIDE 25 MG/1
25 SUPPOSITORY RECTAL ONCE AS NEEDED
Status: DISCONTINUED | OUTPATIENT
Start: 2021-04-05 | End: 2021-04-05 | Stop reason: HOSPADM

## 2021-04-05 RX ORDER — SODIUM CHLORIDE, SODIUM LACTATE, POTASSIUM CHLORIDE, CALCIUM CHLORIDE 600; 310; 30; 20 MG/100ML; MG/100ML; MG/100ML; MG/100ML
9 INJECTION, SOLUTION INTRAVENOUS CONTINUOUS
Status: DISCONTINUED | OUTPATIENT
Start: 2021-04-05 | End: 2021-04-05 | Stop reason: HOSPADM

## 2021-04-05 RX ORDER — ONDANSETRON 2 MG/ML
4 INJECTION INTRAMUSCULAR; INTRAVENOUS ONCE AS NEEDED
Status: DISCONTINUED | OUTPATIENT
Start: 2021-04-05 | End: 2021-04-05 | Stop reason: HOSPADM

## 2021-04-05 RX ORDER — HYDRALAZINE HYDROCHLORIDE 20 MG/ML
5 INJECTION INTRAMUSCULAR; INTRAVENOUS
Status: DISCONTINUED | OUTPATIENT
Start: 2021-04-05 | End: 2021-04-05 | Stop reason: HOSPADM

## 2021-04-05 RX ORDER — PROMETHAZINE HYDROCHLORIDE 12.5 MG/1
12.5 TABLET ORAL EVERY 4 HOURS PRN
Status: DISCONTINUED | OUTPATIENT
Start: 2021-04-05 | End: 2021-04-06 | Stop reason: HOSPADM

## 2021-04-05 RX ORDER — MECLIZINE HYDROCHLORIDE 25 MG/1
25 TABLET ORAL 3 TIMES DAILY PRN
Status: DISCONTINUED | OUTPATIENT
Start: 2021-04-05 | End: 2021-04-06 | Stop reason: HOSPADM

## 2021-04-05 RX ORDER — BUPIVACAINE HYDROCHLORIDE 7.5 MG/ML
INJECTION, SOLUTION EPIDURAL; RETROBULBAR
Status: DISCONTINUED | OUTPATIENT
Start: 2021-04-05 | End: 2021-04-05

## 2021-04-05 RX ORDER — FENTANYL CITRATE 50 UG/ML
50 INJECTION, SOLUTION INTRAMUSCULAR; INTRAVENOUS
Status: DISCONTINUED | OUTPATIENT
Start: 2021-04-05 | End: 2021-04-05 | Stop reason: HOSPADM

## 2021-04-05 RX ORDER — LABETALOL HYDROCHLORIDE 5 MG/ML
5 INJECTION, SOLUTION INTRAVENOUS
Status: DISCONTINUED | OUTPATIENT
Start: 2021-04-05 | End: 2021-04-05 | Stop reason: HOSPADM

## 2021-04-05 RX ORDER — PROMETHAZINE HYDROCHLORIDE 25 MG/1
25 TABLET ORAL ONCE AS NEEDED
Status: DISCONTINUED | OUTPATIENT
Start: 2021-04-05 | End: 2021-04-05 | Stop reason: HOSPADM

## 2021-04-05 RX ORDER — ASPIRIN 81 MG/1
81 TABLET ORAL 2 TIMES DAILY
Qty: 60 TABLET | Refills: 0 | Status: SHIPPED | OUTPATIENT
Start: 2021-04-06 | End: 2021-05-06

## 2021-04-05 RX ORDER — DEXAMETHASONE SODIUM PHOSPHATE 4 MG/ML
INJECTION, SOLUTION INTRA-ARTICULAR; INTRALESIONAL; INTRAMUSCULAR; INTRAVENOUS; SOFT TISSUE AS NEEDED
Status: DISCONTINUED | OUTPATIENT
Start: 2021-04-05 | End: 2021-04-05 | Stop reason: SURG

## 2021-04-05 RX ORDER — ONDANSETRON 4 MG/1
4 TABLET, FILM COATED ORAL EVERY 6 HOURS PRN
Status: DISCONTINUED | OUTPATIENT
Start: 2021-04-05 | End: 2021-04-06 | Stop reason: HOSPADM

## 2021-04-05 RX ORDER — HYDROMORPHONE HYDROCHLORIDE 1 MG/ML
0.5 INJECTION, SOLUTION INTRAMUSCULAR; INTRAVENOUS; SUBCUTANEOUS
Status: DISCONTINUED | OUTPATIENT
Start: 2021-04-05 | End: 2021-04-05 | Stop reason: HOSPADM

## 2021-04-05 RX ORDER — PANTOPRAZOLE SODIUM 40 MG/1
40 TABLET, DELAYED RELEASE ORAL DAILY
Qty: 14 TABLET | Refills: 0 | Status: SHIPPED | OUTPATIENT
Start: 2021-04-05 | End: 2021-04-19

## 2021-04-05 RX ORDER — CEFAZOLIN SODIUM 2 G/100ML
2 INJECTION, SOLUTION INTRAVENOUS EVERY 8 HOURS
Status: COMPLETED | OUTPATIENT
Start: 2021-04-05 | End: 2021-04-06

## 2021-04-05 RX ORDER — FINASTERIDE 5 MG/1
5 TABLET, FILM COATED ORAL NIGHTLY
Status: DISCONTINUED | OUTPATIENT
Start: 2021-04-05 | End: 2021-04-06 | Stop reason: HOSPADM

## 2021-04-05 RX ORDER — FLUMAZENIL 0.1 MG/ML
0.2 INJECTION INTRAVENOUS AS NEEDED
Status: DISCONTINUED | OUTPATIENT
Start: 2021-04-05 | End: 2021-04-05 | Stop reason: HOSPADM

## 2021-04-05 RX ORDER — SODIUM CHLORIDE 0.9 % (FLUSH) 0.9 %
3-10 SYRINGE (ML) INJECTION AS NEEDED
Status: DISCONTINUED | OUTPATIENT
Start: 2021-04-05 | End: 2021-04-05 | Stop reason: HOSPADM

## 2021-04-05 RX ORDER — LEVOTHYROXINE SODIUM 0.07 MG/1
75 TABLET ORAL EVERY MORNING
Status: DISCONTINUED | OUTPATIENT
Start: 2021-04-06 | End: 2021-04-06 | Stop reason: HOSPADM

## 2021-04-05 RX ORDER — HYDROCODONE BITARTRATE AND ACETAMINOPHEN 7.5; 325 MG/1; MG/1
1 TABLET ORAL EVERY 4 HOURS PRN
Status: DISCONTINUED | OUTPATIENT
Start: 2021-04-05 | End: 2021-04-06 | Stop reason: HOSPADM

## 2021-04-05 RX ORDER — ONDANSETRON 4 MG/1
4 TABLET, FILM COATED ORAL EVERY 8 HOURS PRN
Qty: 10 TABLET | Refills: 0 | Status: SHIPPED | OUTPATIENT
Start: 2021-04-05

## 2021-04-05 RX ORDER — HYDROCODONE BITARTRATE AND ACETAMINOPHEN 7.5; 325 MG/1; MG/1
2 TABLET ORAL EVERY 4 HOURS PRN
Status: DISCONTINUED | OUTPATIENT
Start: 2021-04-05 | End: 2021-04-06 | Stop reason: HOSPADM

## 2021-04-05 RX ADMIN — FINASTERIDE 5 MG: 5 TABLET, FILM COATED ORAL at 20:27

## 2021-04-05 RX ADMIN — PREGABALIN 150 MG: 75 CAPSULE ORAL at 07:05

## 2021-04-05 RX ADMIN — CEFAZOLIN SODIUM 2 G: 2 INJECTION, SOLUTION INTRAVENOUS at 08:14

## 2021-04-05 RX ADMIN — HYDROCODONE BITARTRATE AND ACETAMINOPHEN 1 TABLET: 7.5; 325 TABLET ORAL at 12:48

## 2021-04-05 RX ADMIN — PHENYLEPHRINE HYDROCHLORIDE 100 MCG: 10 INJECTION INTRAVENOUS at 09:24

## 2021-04-05 RX ADMIN — BUPIVACAINE HYDROCHLORIDE 1.5 ML: 7.5 INJECTION, SOLUTION EPIDURAL; RETROBULBAR at 08:37

## 2021-04-05 RX ADMIN — LIDOCAINE HYDROCHLORIDE 60 MG: 20 INJECTION, SOLUTION INFILTRATION; PERINEURAL at 08:20

## 2021-04-05 RX ADMIN — POLYETHYLENE GLYCOL 3350 17 G: 17 POWDER, FOR SOLUTION ORAL at 20:26

## 2021-04-05 RX ADMIN — PROPOFOL 30 MCG/KG/MIN: 10 INJECTION, EMULSION INTRAVENOUS at 08:27

## 2021-04-05 RX ADMIN — SODIUM CHLORIDE, POTASSIUM CHLORIDE, SODIUM LACTATE AND CALCIUM CHLORIDE 9 ML/HR: 600; 310; 30; 20 INJECTION, SOLUTION INTRAVENOUS at 06:57

## 2021-04-05 RX ADMIN — HYDROCODONE BITARTRATE AND ACETAMINOPHEN 1 TABLET: 7.5; 325 TABLET ORAL at 23:51

## 2021-04-05 RX ADMIN — MUPIROCIN 1 APPLICATION: 20 OINTMENT TOPICAL at 20:26

## 2021-04-05 RX ADMIN — FENTANYL CITRATE 50 MCG: 50 INJECTION, SOLUTION INTRAMUSCULAR; INTRAVENOUS at 10:54

## 2021-04-05 RX ADMIN — HYDROMORPHONE HYDROCHLORIDE 0.5 MG: 1 INJECTION, SOLUTION INTRAMUSCULAR; INTRAVENOUS; SUBCUTANEOUS at 11:05

## 2021-04-05 RX ADMIN — CEFAZOLIN SODIUM 2 G: 2 INJECTION, SOLUTION INTRAVENOUS at 23:49

## 2021-04-05 RX ADMIN — HYDROMORPHONE HYDROCHLORIDE 0.5 MG: 1 INJECTION, SOLUTION INTRAMUSCULAR; INTRAVENOUS; SUBCUTANEOUS at 10:47

## 2021-04-05 RX ADMIN — PANTOPRAZOLE SODIUM 40 MG: 40 TABLET, DELAYED RELEASE ORAL at 12:48

## 2021-04-05 RX ADMIN — TRANEXAMIC ACID 1000 MG: 1 INJECTION, SOLUTION INTRAVENOUS at 09:37

## 2021-04-05 RX ADMIN — PROPOFOL 120 MCG/KG/MIN: 10 INJECTION, EMULSION INTRAVENOUS at 08:40

## 2021-04-05 RX ADMIN — CEFAZOLIN SODIUM 2 G: 2 INJECTION, SOLUTION INTRAVENOUS at 16:14

## 2021-04-05 RX ADMIN — VANCOMYCIN HYDROCHLORIDE 1250 MG: 10 INJECTION, POWDER, LYOPHILIZED, FOR SOLUTION INTRAVENOUS at 07:05

## 2021-04-05 RX ADMIN — DEXAMETHASONE SODIUM PHOSPHATE 8 MG: 4 INJECTION, SOLUTION INTRAMUSCULAR; INTRAVENOUS at 08:40

## 2021-04-05 RX ADMIN — FENTANYL CITRATE 50 MCG: 50 INJECTION, SOLUTION INTRAMUSCULAR; INTRAVENOUS at 10:39

## 2021-04-05 NOTE — THERAPY EVALUATION
Patient Name: Asa Magaña  : 1940    MRN: [id number]                              Today's Date: 2021       Admit Date: 2021    Visit Dx:     ICD-10-CM ICD-9-CM   1. S/P TKR (total knee replacement), right  Z96.651 V43.65   2. Chronic pain of right knee  M25.561 719.46    G89.29 338.29     Patient Active Problem List   Diagnosis   • Asthma   • Hyperlipidemia   • Lumbago   • Hypothyroidism   • Hx of migraine headaches   • BPH (benign prostatic hyperplasia)   • Pyelonephritis   • CAP (community acquired pneumonia)   • Sepsis (CMS/[facility])   • Rectal bleeding   • Primary osteoarthritis of left knee   • Vertigo   • Chronic maxillary sinusitis   • Chest pain, atypical   • Chronic pain of right knee     Past Medical History:   Diagnosis Date   • Asthma    • BPH (benign prostatic hyperplasia)    • COPD (chronic obstructive pulmonary disease) (CMS/HCC)    • COVID-19 vaccine series completed     LAST DOSE GIVEN    • GERD (gastroesophageal reflux disease)    • History of kidney stones    • History of sepsis 2017    BLADDER INFECTION AND PNEUMONIA BOTH TWICE   • Hx of migraine headaches    • Hyperlipidemia    • Hypothyroidism    • Knee pain     RIGHT   • Unsteady gait     D/T RIGHT KNEE   • Weakness     RIGHT KNEE/RIGHT LEG     Past Surgical History:   Procedure Laterality Date   • COLONOSCOPY  2016    benign polyp-repeat 5 years- Filemon Beasley M.D.   • CYSTOSCOPY TRANSURETHRAL RESECTION OF PROSTATE     • CYSTOSCOPY W/ URETERAL STENT PLACEMENT Right 2017    Procedure: CYSTOSCOPY, ureteroscopy,  URETERAL STENT INSERTION, RT;  Surgeon: Shashi Arciniega MD;  Location: [facility];  Service:    • HERNIA REPAIR Right    • KNEE ARTHROPLASTY UNICOMPARTMENTAL Left 2018    Procedure: KNEE ARTHROPLASTY UNICOMPARTMENTAL;  Surgeon: Davin Beasley MD;  Location: [facility];  Service: Orthopedics   • KNEE SURGERY Bilateral     KNEE ARTHROSCOPY X3   • THYROIDECTOMY       General Information      Row Name 04/05/21 1403          Physical Therapy Time and Intention    Document Type  evaluation  -PC     Mode of Treatment  physical therapy  -PC     Row Name 04/05/21 1403          General Information    Patient Profile Reviewed  yes  -PC     Prior Level of Function  independent:  -PC     Existing Precautions/Restrictions  fall  -PC     Row Name 04/05/21 1403          Living Environment    Lives With  spouse  -PC     Row Name 04/05/21 1403          Home Main Entrance    Number of Stairs, Main Entrance  seven  -PC     Stair Railings, Main Entrance  railings safe and in good condition  -PC     Row Name 04/05/21 1403          Stairs Within Home, Primary    Stairs, Within Home, Primary  can use downstairs bedroom if needed, otherwise has a flight of stairs to bedroom  -PC     Number of Stairs, Within Home, Primary  one  -PC     Row Name 04/05/21 1403          Cognition    Orientation Status (Cognition)  oriented x 3  -PC     Row Name 04/05/21 1403          Safety Issues, Functional Mobility    Safety Issues Affecting Function (Mobility)  insight into deficits/self-awareness  -PC     Impairments Affecting Function (Mobility)  endurance/activity tolerance;strength;pain;range of motion (ROM)  -PC     Comment, Safety Issues/Impairments (Mobility)  lethargic, falling asleep during exercises  -PC       User Key  (r) = Recorded By, (t) = Taken By, (c) = Cosigned By    Initials Name Provider Type    PC Paty Bragg, PT Physical Therapist        Mobility     Row Name 04/05/21 1405          Bed Mobility    Bed Mobility  supine-sit;sit-supine  -PC     Supine-Sit [place] (Bed Mobility)  minimum assist (75% patient effort)  -PC     Row Name 04/05/21 1405          Sit-Stand Transfer    Sit-Stand [place] (Transfers)  minimum assist (75% patient effort);2 person assist  -PC     Assistive Device (Sit-Stand Transfers)  walker, front-wheeled  -PC     Row Name 04/05/21 1405          Gait/Stairs (Locomotion)     [place] Level (Gait)  minimum assist (75% patient effort);2 person assist;verbal cues;nonverbal cues (demo/gesture)  -PC     Assistive Device (Gait)  walker, front-wheeled  -     Distance in Feet (Gait)  25 ft  -PC     Deviations/Abnormal Patterns (Gait)  gait speed decreased;stride length decreased;antalgic;festinating/shuffling  -PC     Bilateral Gait Deviations  heel strike decreased  -PC     Comment (Gait/Stairs)  initially very short shuffling steps, cues for sequencing, step length  -PC       User Key  (r) = Recorded By, (t) = Taken By, (c) = Cosigned By    Initials Name Provider Type    PC Paty Bragg PT Physical Therapist        Obj/Interventions     Row Name 04/05/21 1407          Range of Motion Comprehensive    Comment, General Range of Motion  WFL x R knee  -PC     Row Name 04/05/21 1407          Strength Comprehensive (MMT)    Comment, General Manual Muscle Testing (MMT) Assessment  WFL x R knee, RLE NVI  -PC     Row Name 04/05/21 1407          Motor Skills    Therapeutic Exercise  -- 10 reps TKR ex  -PC     Row Name 04/05/21 1407          Sensory Assessment (Somatosensory)    Sensory Assessment (Somatosensory)  sensation intact  -PC       User Key  (r) = Recorded By, (t) = Taken By, (c) = Cosigned By    Initials Name Provider Type    PC Paty Bragg PT Physical Therapist        Goals/Plan     Row Name 04/05/21 1411          Transfer Goal 1 (PT)    Activity/Assistive Device (Transfer Goal 1, PT)  sit-to-stand/stand-to-sit  -     [place] Level/Cues Needed (Transfer Goal 1, PT)  standby assist  -PC     Time Frame (Transfer Goal 1, PT)  1 week  -PC     Row Name 04/05/21 1411          Gait Training Goal 1 (PT)    Activity/Assistive Device (Gait Training Goal 1, PT)  gait (walking locomotion);assistive device use  -     [place] Level (Gait Training Goal 1, PT)  standby assist  -PC     Distance (Gait Training Goal 1, PT)  80 ft  -PC     Time Frame (Gait Training Goal 1, PT)  1  week  -PC     Row Name 04/05/21 1411          ROM Goal 1 (PT)    ROM Goal 1 (PT)  5-90  -PC     Time Frame (ROM Goal 1, PT)  1 week  -PC     Row Name 04/05/21 1411          Stairs Goal 1 (PT)    Activity/Assistive Device (Stairs Goal 1, PT)  stairs, all skills  -PC     [place] Level/Cues Needed (Stairs Goal 1, PT)  contact guard assist  -PC     Number of Stairs (Stairs Goal 1, PT)  4  -PC     Time Frame (Stairs Goal 1, PT)  1 week  -PC       User Key  (r) = Recorded By, (t) = Taken By, (c) = Cosigned By    Initials Name Provider Type    PC Paty Bragg, PT Physical Therapist        Clinical Impression     Row Name 04/05/21 1407          Pain    Additional Documentation  Pain Scale: Numbers Pre/Post-Treatment (Group)  -     Row Name 04/05/21 1402          Pain Scale: Numbers Pre/Post-Treatment    Pretreatment Pain Rating  8/10  -PC     Posttreatment Pain Rating  8/10  -PC     Pain Location - Side  Right  -PC     Pain Location  knee  -PC     Pain Intervention(s)  Medication (See MAR);Cold applied;Repositioned  -     Row Name 04/05/21 3621          Plan of Care Review    Plan of Care Reviewed With  patient;spouse  -PC     Outcome Summary  pt is POD 0 R TKR, presents with post op pain, weakness, impaired ROM, decreased functional mobility, he will benefit from PT to address, pt was lethargic this afternoon and was dizzy and nauseous with getting up, able to walk 25 ft with Rwx and assist of 2 for safety, pt needed frequent verbal cues for sequencing. Anticipate that pt will do well and rec home tomorrow with assist. Pt has a rwx and 7 stairs to enter home with one rail, has a flight of stairs to upstairs bedroom but able to stay in bedroom on first floor if needed. PT will follow  -PC     Row Name 04/05/21 4474          Therapy Assessment/Plan (PT)    Rehab Potential (PT)  good, to achieve stated therapy goals  -PC     Criteria for Skilled Interventions Met (PT)  yes;meets criteria  -     Row Name 04/05/21  1408          Positioning and Restraints    Post Treatment Position  chair  -PC     In Chair  reclined;call light within reach;encouraged to call for assist;exit alarm on;with family/caregiver  -PC       User Key  (r) = Recorded By, (t) = Taken By, (c) = Cosigned By    Initials Name Provider Type    PC Paty Bragg PT Physical Therapist        Outcome Measures     Row Name 04/05/21 1412          How much help from another person do you currently need...    Turning from your back to your side while in flat bed without using bedrails?  3  -PC     Moving from lying on back to sitting on the side of a flat bed without bedrails?  3  -PC     Moving to and from a bed to a chair (including a wheelchair)?  3  -PC     Standing up from a chair using your arms (e.g., wheelchair, bedside chair)?  3  -PC     Climbing 3-5 steps with a railing?  2  -PC     To walk in hospital room?  3  -PC     AM-PAC 6 Clicks Score (PT)  17  -PC     Row Name 04/05/21 1412          Functional Assessment    Outcome Measure Options  AM-PAC 6 Clicks Basic Mobility (PT)  -PC       User Key  (r) = Recorded By, (t) = Taken By, (c) = Cosigned By    Initials Name Provider Type    PC Paty Bragg PT Physical Therapist        Physical Therapy Education                 Title: PT OT SLP Therapies (Done)     Topic: Physical Therapy (Done)     Point: Mobility training (Done)     Learning Progress Summary           Patient Acceptance, E,D, DU by PC at 4/5/2021 1412                   Point: Home exercise program (Done)     Learning Progress Summary           Patient Acceptance, E,D, DU by PC at 4/5/2021 1412                   Point: Body mechanics (Done)     Learning Progress Summary           Patient Acceptance, E,D, DU by PC at 4/5/2021 1412                   Point: Precautions (Done)     Learning Progress Summary           Patient Acceptance, E,D, DU by PC at 4/5/2021 1412                               User Key     Initials Effective Dates Name Provider  Type Discipline    PC 04/03/18 -  Paty Bragg PT Physical Therapist PT              PT Recommendation and Plan  Planned Therapy Interventions (PT): balance training, bed mobility training, gait training, transfer training, strengthening, stair training, ROM (range of motion)  Plan of Care Reviewed With: patient, spouse  Outcome Summary: pt is POD 0 R TKR, presents with post op pain, weakness, impaired ROM, decreased functional mobility, he will benefit from PT to address, pt was lethargic this afternoon and was dizzy and nauseous with getting up, able to walk 25 ft with Rwx and assist of 2 for safety, pt needed frequent verbal cues for sequencing. Anticipate that pt will do well and rec home tomorrow with assist. Pt has a rwx and 7 stairs to enter home with one rail, has a flight of stairs to upstairs bedroom but able to stay in bedroom on first floor if needed. PT will follow     Time Calculation:   PT Charges     Row Name 04/05/21 1413             Time Calculation    Start Time  1330  -PC      Stop Time  1358  -PC      Time Calculation (min)  28 min  -PC      PT Received On  04/05/21  -PC      PT - Next Appointment  04/06/21  -PC      PT Goal Re-Cert Due Date  04/12/21  -PC        User Key  (r) = Recorded By, (t) = Taken By, (c) = Cosigned By    Initials Name Provider Type    PC Paty Bragg PT Physical Therapist        Therapy Charges for Today     Code Description Service Date Service Provider Modifiers Qty    53301994618  PT EVAL MOD COMPLEXITY 2 4/5/2021 Paty Bragg, PT GP 1    [id number]  PT THER PROC EA 15 MIN 4/5/2021 Paty Bragg, PT GP 1          PT G-Codes  Outcome Measure Options: AM-PAC 6 Clicks Basic Mobility (PT)  AM-PAC 6 Clicks Score (PT): 17    Paty Bragg PT  4/5/2021

## 2021-04-05 NOTE — ANESTHESIA PREPROCEDURE EVALUATION
Anesthesia Evaluation     Patient summary reviewed and Nursing notes reviewed   no history of anesthetic complications:  NPO Solid Status: > 8 hours  NPO Liquid Status: > 2 hours           Airway   Mallampati: III  TM distance: >3 FB  Neck ROM: full  Possible difficult intubation and Narrow palate  Dental - normal exam     Pulmonary - normal exam   (+) COPD mild, asthma,sleep apnea,   (-) pneumonia, not a smoker, lung cancer  Cardiovascular - normal exam  Exercise tolerance: good (4-7 METS)    ECG reviewed  Rhythm: regular  Rate: normal    (+) hyperlipidemia,   (-) hypertension, valvular problems/murmurs, past MI, CAD, dysrhythmias, angina, CHF, orthopnea, cardiac stents, CABG, pericardial effusion      Neuro/Psych  (+) dizziness/light headedness, weakness,     (-) seizures, TIA, CVA  GI/Hepatic/Renal/Endo    (+)  GERD well controlled, GI bleeding lower resolved, thyroid problem hypothyroidism  (-) PUD, hepatitis, liver disease, no renal disease, diabetes    Musculoskeletal     Abdominal  - normal exam   Substance History - negative use     OB/GYN negative ob/gyn ROS         Other   arthritis,          Phys Exam Other: retrognathic                Anesthesia Plan    ASA 3     spinal       Anesthetic plan, all risks, benefits, and alternatives have been provided, discussed and informed consent has been obtained with: patient.    Plan discussed with CRNA.

## 2021-04-05 NOTE — PLAN OF CARE
Goal Outcome Evaluation:  Plan of Care Reviewed With: patient, spouse     Outcome Summary: pt is POD 0 R TKR, presents with post op pain, weakness, impaired ROM, decreased functional mobility, he will benefit from PT to address, pt was lethargic this afternoon and was dizzy and nauseous with getting up, able to walk 25 ft with Rwx and assist of 2 for safety, pt needed frequent verbal cues for sequencing. Anticipate that pt will do well and rec home tomorrow with assist. Pt has a rwx and 7 stairs to enter home with one rail, has a flight of stairs to upstairs bedroom but able to stay in bedroom on first floor if needed. PT will follow  Patient was intermittently wearing a face mask during this therapy encounter. Therapist used appropriate personal protective equipment including eye protection, mask, and gloves.  Mask used was standard procedure mask. Appropriate PPE was worn during the entire therapy session. Hand hygiene was completed before and after therapy session. Patient is not in enhanced droplet precautions.

## 2021-04-05 NOTE — OP NOTE
Name: Asa Magaña  YOB: 1940    DATE OF SURGERY: 4/5/2021    PREOPERATIVE DIAGNOSIS: Right knee end-stage osteoarthritis    POSTOPERATIVE DIAGNOSIS: Right knee end-stage osteoarthritis    PROCEDURE PERFORMED: Right total knee replacement    SURGEON: Davin Beasley M.D.    ASSISTANT: SHAYY BENOIT    IMPLANTS: [employer]phrache[employer] Legion:     Implant Name Type Inv. Item Serial No.  Lot No. LRB No. Used Action   CMT BONE PALACOS R HI/VISC 1X40 - [id number] Implant CMT BONE PALACOS R HI/VISC 1X40  [facility] [id number] Right 2 Implanted   SUT CONTRL TISS STRATAFIX SPIRAL MNCRYL UD 3/0 PLS 30CM - [id number] Implant SUT CONTRL TISS STRATAFIX SPIRAL MNCRYL UD 3/0 PLS 30CM  [employer]  DIV OF J AND J [id number] Right 1 Implanted   SUT CONTRL TISS STRATAFIX SYMM PDS PLUS JAN CT-1 60CM - [id number] Implant SUT CONTRL TISS STRATAFIX SYMM PDS PLUS JAN CT-1 60CM  [employer]  DIV OF J [employer] J [id number] Right 1 Implanted   PAT GEN2 BICONVEX [id number] - [id number] Implant PAT GEN2 BICONVEX [id number]  [employer] [id number] Right 1 Implanted   BASE TIB/KN GEN2 NONPOR TI SZ6 RT - [id number] Implant BASE TIB/KN GEN2 NONPOR TI SZ6 RT  [employer] [id number] Right 1 Implanted   COMP FEM LEGION OXINIUM CR SZ6 RT - [id number] Implant COMP FEM LEGION OXINIUM CR SZ6 RT  [employer] [id number] Right 1 Implanted   INSRT ART LEGION CR HF XLPE SZ5TO6 9MM - [id number] Implant INSRT ART LEGION CR HF XLPE SZ5TO6 9MM  [employer] [id number] Right 1 Implanted       Estimated Blood Loss: 200cc  Specimens : none  Complications: none    DESCRIPTION OF PROCEDURE: The patient was taken to the operating room and placed in the supine position. A sequential compression device was carefully placed on the non-operative leg. Preoperative antibiotics were administered. Surgical time out was performed. After adequate induction of anesthesia, the leg was prepped and draped in the usual sterile fashion, exsanguinated with an  Esmarch bandage and the tourniquet inflated to 250 mmHg. A midline incision was performed followed by a medial parapatellar arthrotomy. The patella was subluxed laterally.  A portion of the fat pad, ACL, and anterior horns of the meniscus were excised. The drill hole was placed in the distal femur and the canal was the irrigated and suctioned. The IM siva was placed and a 5 degree distal valgus cut was performed on the femur. The femur was then sized with a sizing guide. The femoral cutting block was placed and all femoral cuts were performed. The proximal tibia was exposed. We used the extramedullary tibial cutting guide set for removal of 9mm of bone off the high side. The tibial cut was performed. The posterior horns of the menisci were excised. The posterior osteophytes were removed. Flexion extension blocks were then used to balance the knee. The tibial cut surface was then sized with the sizing templates and the tibial and femoral trial were then placed. The knee was placed in full extension and then the tibial tray rotation was then matched to the femoral rotation and marked.    Attention was then placed to the patella. The patella was noted to track centrally through range of motion. The patella was then sized with the trials. The thickness of the patella was then measured. The patella was resurfaced and the surrounding osteophytes were removed. The preoperative thickness was reproduced. The patella tracked centrally through range of motion.   At this point all trial components were removed, the knee was copiously irrigated with pulsed lavage, and the knee was injected with anesthetic cocktail solution. The cut surfaces were then dried with clean lap sponges, and the components were cemented tibia, followed by femur, then patella. The knee was held in full extension and all excess cement was removed. The knee was held still until the cement had completely hardened. We then placed the trial polyethylene spacer  which resulted in full extension and excellent flexion-extension balance. We placed the final polyethylene spacer.   The knee was then copiously irrigated. The tourniquet was then released. There was excellent hemostasis. We placed a one-eighth inch Hemovac drain. We closed the knee in multiple layers in standard fashion. Sterile dressing were applied. At the end of the case, the sponge and needle counts were reported as being correct. There were no known complications. The patient was then transported to the recovery room.      Davin Beasley M.D.

## 2021-04-05 NOTE — ANESTHESIA PROCEDURE NOTES
Spinal Block    Pre-sedation assessment completed: 4/5/2021 8:30 AM    Patient reassessed immediately prior to procedure    Patient location during procedure: OB  Start Time: 4/5/2021 8:30 AM  Stop Time: 4/5/2021 8:37 AM  Indication:at surgeon's request  Performed By  Anesthesiologist: Evan aBires MD  Preanesthetic Checklist  Completed: patient identified, IV checked, site marked, risks and benefits discussed, surgical consent, monitors and equipment checked, pre-op evaluation and timeout performed  Spinal Block Prep:  Patient Position:sitting  Sterile Tech:cap, gloves, mask and sterile barriers  Prep:Chloraprep  Patient Monitoring:blood pressure monitoring, continuous pulse oximetry and EKG  Spinal Block Procedure  Approach:midline  Guidance:landmark technique and palpation technique  Location:L4-L5  Needle Type:Sprotte  Needle Gauge:24  Placement of Spinal needle event:cerebrospinal fluid aspirated  Paresthesia: left  Fluid Appearance:clear  Medications: bupivacaine PF (MARCAINE) 0.75 % injection, 1.5 mL  Med Administered at 4/5/2021 8:37 AM   Post Assessment  Patient Tolerance:patient tolerated the procedure well with no apparent complications  Complications no  Additional Notes  Pt. To OB OR for procedure.  Sitting on side of bed, NIMP, 2L O2 [place], SAB placed as per note above.  Pt. Placed supine with left uterine displacement.

## 2021-04-05 NOTE — ANESTHESIA POSTPROCEDURE EVALUATION
"Patient: Asa Magaña    Procedure Summary     Date: 04/05/21 Room / Location: [facility] OR [address] [place] [address] MAIN OR    Anesthesia Start: 0814 Anesthesia Stop: 1031    Procedure: TOTAL KNEE ARTHROPLASTY (Right Knee) Diagnosis:       Chronic pain of right knee      (Chronic pain of right knee [M25.561, G89.29])    Surgeons: Davin Beasley MD Provider: Evan Baires MD    Anesthesia Type: spinal ASA Status: 3          Anesthesia Type: spinal    Vitals  Vitals Value Taken Time   /85 04/05/21 1100   Temp 36.1 °C (96.9 °F) 04/05/21 1029   Pulse 67 04/05/21 1108   Resp 16 04/05/21 1100   SpO2 99 % 04/05/21 1108   Vitals shown include unvalidated device data.        Post Anesthesia Care and Evaluation    Patient location during evaluation: bedside  Patient participation: complete - patient participated  Level of consciousness: awake and alert  Pain score: 0  Pain management: adequate  Airway patency: patent  Anesthetic complications: No anesthetic complications    Cardiovascular status: acceptable  Respiratory status: acceptable  Hydration status: acceptable    Comments: /85 (BP Location: Right arm, Patient Position: Lying)   Pulse 63   Temp 36.1 °C (96.9 °F) (Oral)   Resp 16   Ht 182.9 cm (72\")   Wt 92.2 kg (203 lb 4.8 oz)   SpO2 99%   BMI 27.57 kg/m²       "

## 2021-04-05 NOTE — ANESTHESIA PROCEDURE NOTES
Spinal Block      Performed By  Anesthesiologist: Evan Baires MD  Preanesthetic Checklist  Completed: patient identified, IV checked, site marked, risks and benefits discussed, surgical consent, monitors and equipment checked, pre-op evaluation and timeout performed  Spinal Block Prep:  Patient Position:sitting  Sterile Tech:cap, gloves, sterile barriers and mask  Prep:Chloraprep  Patient Monitoring:EKG, continuous pulse oximetry and blood pressure monitoring  Spinal Block Procedure  Approach:midline  Location:L3-L4  Needle Type:Sprotte  Needle Gauge:22 G  Placement of Spinal needle event:cerebrospinal fluid aspirated  Paresthesia: no  Fluid Appearance:clear  Medications: bupivacaine PF (MARCAINE) 0.75 % injection, 2 mL   Post Assessment  Patient Tolerance:patient tolerated the procedure well with no apparent complications  Complications no

## 2021-04-05 NOTE — PLAN OF CARE
Goal Outcome Evaluation:  Plan of Care Reviewed With: patient  Progress: improving  Outcome Summary: Pt is 81 y/o male, s/p right TKA. Dressing is cdi, vss, n/v intact. Pt ambulated in room with physical therapy and staff, experienced some dizziness with nausea with ambulation. Educated pt on pain management. Will continue to monitor oxygen saturation r/t history of COPD.

## 2021-04-06 VITALS
HEIGHT: 72 IN | RESPIRATION RATE: 16 BRPM | OXYGEN SATURATION: 96 % | TEMPERATURE: 97.1 F | DIASTOLIC BLOOD PRESSURE: 74 MMHG | HEART RATE: 68 BPM | WEIGHT: 203.3 LBS | BODY MASS INDEX: 27.54 KG/M2 | SYSTOLIC BLOOD PRESSURE: 111 MMHG

## 2021-04-06 LAB
HCT VFR BLD AUTO: 41.5 % (ref 37.5–51)
HGB BLD-MCNC: 14.1 G/DL (ref 13–17.7)

## 2021-04-06 PROCEDURE — 85018 HEMOGLOBIN: CPT | Performed by: NURSE PRACTITIONER

## 2021-04-06 PROCEDURE — A9270 NON-COVERED ITEM OR SERVICE: HCPCS | Performed by: NURSE PRACTITIONER

## 2021-04-06 PROCEDURE — 97110 THERAPEUTIC EXERCISES: CPT

## 2021-04-06 PROCEDURE — 63710000001 ASPIRIN 81 MG TABLET DELAYED-RELEASE: Performed by: NURSE PRACTITIONER

## 2021-04-06 PROCEDURE — 63710000001 PANTOPRAZOLE 40 MG TABLET DELAYED-RELEASE: Performed by: NURSE PRACTITIONER

## 2021-04-06 PROCEDURE — 85014 HEMATOCRIT: CPT | Performed by: NURSE PRACTITIONER

## 2021-04-06 PROCEDURE — 63710000001 LEVOTHYROXINE 75 MCG TABLET: Performed by: NURSE PRACTITIONER

## 2021-04-06 PROCEDURE — 63710000001 POLYETHYLENE GLYCOL 17 G PACK: Performed by: NURSE PRACTITIONER

## 2021-04-06 PROCEDURE — 63710000001 HYDROCODONE-ACETAMINOPHEN 7.5-325 MG TABLET: Performed by: NURSE PRACTITIONER

## 2021-04-06 RX ADMIN — PANTOPRAZOLE SODIUM 40 MG: 40 TABLET, DELAYED RELEASE ORAL at 07:36

## 2021-04-06 RX ADMIN — LEVOTHYROXINE SODIUM 75 MCG: 0.07 TABLET ORAL at 05:18

## 2021-04-06 RX ADMIN — HYDROCODONE BITARTRATE AND ACETAMINOPHEN 2 TABLET: 7.5; 325 TABLET ORAL at 03:45

## 2021-04-06 RX ADMIN — POLYETHYLENE GLYCOL 3350 17 G: 17 POWDER, FOR SOLUTION ORAL at 09:11

## 2021-04-06 RX ADMIN — HYDROCODONE BITARTRATE AND ACETAMINOPHEN 2 TABLET: 7.5; 325 TABLET ORAL at 07:36

## 2021-04-06 RX ADMIN — ASPIRIN 81 MG: 81 TABLET, COATED ORAL at 09:11

## 2021-04-06 RX ADMIN — HYDROCODONE BITARTRATE AND ACETAMINOPHEN 2 TABLET: 7.5; 325 TABLET ORAL at 11:25

## 2021-04-06 NOTE — NURSING NOTE
Patient experiencing urinary retention, unable to void post-operatively. Patient was bladder scanned as having 420 mL and was unable to void spontaneously. A straight catheter was attempted x2 by nursing but was unable to be placed due to resistance from the patient's prostate. A call was placed to the doctor on call to request urology consult.

## 2021-04-06 NOTE — CONSULTS
[facility]      Patient Identification:  NAME:  Asa Magaña  Age:  80 y.o.   Sex:  male   :  1940   MRN:  [id number]       Chief complaint: Urinary Retention    History of present illness:  Asa Magaña is a 80 year old man, history of BPH, S?p Rezum procedure   with some improvement.  He has been followed by Dr. Eliseo Arcin[facility]ga[facility] as recently as last year.  He reports no trouble voiding immediately prior to coming into hospital this morning. Multiple attempts by nursing staff unsuccessful. Bladder scanned for > 400 mL.     Cr 1.29 3/25/2021  Wbc 5.8 3/25/2021      Past medical history:  Past Medical History:   Diagnosis Date   • Asthma    • BPH (benign prostatic hyperplasia)    • COPD (chronic obstructive pulmonary disease) (CMS/[facility])    • COVID-19 vaccine series completed     LAST DOSE GIVEN    • GERD (gastroesophageal reflux disease)    • History of kidney stones    • History of sepsis 2017    BLADDER INFECTION AND PNEUMONIA BOTH TWICE   • Hx of migraine headaches    • Hyperlipidemia    • Hypothyroidism    • Knee pain     RIGHT   • Unsteady gait     D/T RIGHT KNEE   • Weakness     RIGHT KNEE/RIGHT LEG       Past surgical history:  Past Surgical History:   Procedure Laterality Date   • COLONOSCOPY  2016    benign polyp-repeat 5 years- Filemon Beasley M.D.   • CYSTOSCOPY TRANSURETHRAL RESECTION OF PROSTATE     • CYSTOSCOPY W/ URETERAL STENT PLACEMENT Right 2017    Procedure: CYSTOSCOPY, ureteroscopy,  URETERAL STENT INSERTION, RT;  Surgeon: Shashi Arciniega MD;  Location: [facility];  Service:    • HERNIA REPAIR Right    • KNEE ARTHROPLASTY UNICOMPARTMENTAL Left 2018    Procedure: KNEE ARTHROPLASTY UNICOMPARTMENTAL;  Surgeon: Davin Beasley MD;  Location: [facility];  Service: Orthopedics   • KNEE SURGERY Bilateral     KNEE ARTHROSCOPY X3   • THYROIDECTOMY         Allergies:  Ciprofloxacin, Quinolones, and Terazosin    Home  medications:  Medications Prior to Admission   Medication Sig Dispense Refill Last Dose   • acetaminophen (TYLENOL) 325 MG tablet Take 650 mg by mouth Every 6 (Six) Hours As Needed for Mild Pain .   4/3/2021   • Chlorhexidine Gluconate 2 % pads Apply 1 each topically Take As Directed. AS DIRECTED PAT    4/5/2021 at 0500   • finasteride (PROSCAR) 5 MG tablet Take 5 mg by mouth Every Night.   4/4/2021 at 1830   • levothyroxine (SYNTHROID, LEVOTHROID) 75 MCG tablet Take 75 mcg by mouth Every Morning.   4/5/2021 at 0500   • mupirocin (BACTROBAN) 2 % ointment Apply 1 application topically to the appropriate area as directed Take As Directed. AS DIRECTED PAT    4/5/2021 at 0500   • rosuvastatin (CRESTOR) 5 MG tablet Take 5 mg by mouth Every Night.   4/4/2021 at 1830   • Cholecalciferol (Vitamin D3) 25 MCG (1000 UT) capsule Take 1,000 Units by mouth Daily. HOLD FOR SURGERY   3/22/2021   • fluticasone (VERAMYST) 27.5 MCG/SPRAY nasal spray 2 sprays into the nostril(s) as directed by provider As Needed.   3/29/2021   • meclizine (ANTIVERT) 25 MG tablet Take 1 tablet by mouth 3 (Three) Times a Day As Needed for Dizziness. 30 tablet 0 3/5/2021   • omeprazole (priLOSEC) 40 MG capsule Take 40 mg by mouth 2 (Two) Times a Day As Needed.   3/5/2021        Hospital medications:  [START ON 4/6/2021] aspirin, 81 mg, Oral, Q12H  ceFAZolin, 2 g, Intravenous, Q8H  finasteride, 5 mg, Oral, Nightly  [START ON 4/6/2021] levothyroxine, 75 mcg, Oral, QAM  mupirocin, , Each Nare, BID  pantoprazole, 40 mg, Oral, QAM  polyethylene glycol, 17 g, Oral, BID         HYDROcodone-acetaminophen  •  HYDROcodone-acetaminophen  •  meclizine  •  melatonin  •  ondansetron **OR** ondansetron  •  promethazine    Family history:  Family History   Problem Relation Age of Onset   • Diabetes Mother    • Heart disease Mother    • Diabetes Father    • Heart disease Father    • Malig Hyperthermia Neg Hx        Social history:  Social History     Tobacco Use   •  Smoking status: Never Smoker   • Smokeless tobacco: Never Used   Vaping Use   • Vaping Use: Never used   Substance Use Topics   • Alcohol use: Yes     Comment: seldom   • Drug use: No       REVIEW OF SYSTEMS:  Constitutional - Negative for fevers/chills  Eyes/Ears/Nose/Mouth/Throat - Negative for changes in vision  Cardiovascular - Negative for chest pain, dysrhythmia  Respiratory - Negative for dyspnea  Gastrointestinal - Negative for nausea or vomiting  Genitourinary - See HPI  Hematologic/Lymphatic - Negative for bruising  Skin - Negative for erythema  Endocrine - Negative for history of diabetes    Objective:  TMax 24 hours:   Temp (24hrs), Av.4 °F (36.3 °C), Min:96.9 °F (36.1 °C), Max:97.7 °F (36.5 °C)      Vitals Ranges:   Temp:  [96.9 °F (36.1 °C)-97.7 °F (36.5 °C)] 97.6 °F (36.4 °C)  Heart Rate:  [57-76] 76  Resp:  [16-20] 16  BP: (130-161)/(70-87) 130/78    Intake/Output Last 3 shifts:  I/O last 3 completed shifts:  In: 600 [I.V.:500; IV Piggyback:100]  Out: 50 [Blood:50]     Physical Exam:    General Appearance:    Alert, cooperative, NAD   HEENT:    No trauma, pupils reactive, hearing intact   Back:     No CVA tenderness   Lungs:     Respirations unlabored, no wheezing    Heart:    RRR, intact peripheral pulses   Abdomen:     Soft, NDNT, no masses, no guarding   :    Testes descended bilaterally, no nodules.  Penis normal.      No scrotal or penile rashes noted   Extremities:   No edema, no deformity   Lymphatic:   No neck or groin LAD   Skin:   No bleeding, bruising or rashes   Neuro/Psych:   Orientation intact, mood/affect pleasant, no focal findings       Results review:   I reviewed the patient's new clinical results.    Data review:  Lab Results (last 24 hours)     ** No results found for the last 24 hours. **           Imaging:  Imaging Results (Last 24 Hours)     Procedure Component Value Units Date/Time    XR Knee 1 or 2 View Right [[id number]] Collected: 21 1104     Updated: 21  1110    Narrative:      RIGHT KNEE:  AP, LATERAL     HISTORY: Total knee arthroplasty.     FINDINGS: There has been right total knee arthroplasty and patellar  resurfacing and the components appear in good position without evidence  for complicating feature.  Recent post-surgical changes include soft  tissue gas and a surgical drain. There is a chronic ossification  adjacent to the proximal aspect of the medial femoral condyle.     This report was finalized on 4/5/2021 11:07 AM by Dr. Duncan Simon M.D.                Assessment:       Chronic pain of right knee    Urinary Retention, Post-op    Complex Yu catheter placement  Multiple attempts by nursing staff unsuccessful.   18 Fr straight-tip Yu placed with minimal resistance  10 mL in balloon  Return of copious clear/yellow urine    Plan:     - Maintain Yu catheter x 1 week   - Should Discharge Home with Yu catheter  - Follow-up with [facility] for [facility]ing Trial ([facility]) 1 week after Discharge - Schedulers messaged.  - Urology will sign off      Bryan Alvarado MD  04/05/21  21:07 EDT

## 2021-04-06 NOTE — PLAN OF CARE
Goal Outcome Evaluation:  Plan of Care Reviewed With: patient, spouse  Progress: improving  Outcome Summary: Pt incr amb dist to 200ft , completed TKR exer protocol x10 reps , completed flight of steps w/CGA and rail-will have 2 rails at home and wife assist; plans HH , then outpt PT    Patient was wearing a face mask during this therapy encounter. Therapist used appropriate personal protective equipment including eye protection, mask, and gloves.  Mask used was standard procedure mask. Appropriate PPE was worn during the entire therapy session. Hand hygiene was completed before and after therapy session. Patient is not in enhanced droplet precautions.

## 2021-04-06 NOTE — THERAPY TREATMENT NOTE
Patient Name: Asa Magaña  : 1940    MRN: [id number]                              Today's Date: 2021       Admit Date: 2021    Visit Dx:     ICD-10-CM ICD-9-CM   1. S/P TKR (total knee replacement), right  Z96.651 V43.65   2. Chronic pain of right knee  M25.561 719.46    G89.29 338.29     Patient Active Problem List   Diagnosis   • Asthma   • Hyperlipidemia   • Lumbago   • Hypothyroidism   • Hx of migraine headaches   • BPH (benign prostatic hyperplasia)   • Pyelonephritis   • CAP (community acquired pneumonia)   • Sepsis (CMS/[facility])   • Rectal bleeding   • Primary osteoarthritis of left knee   • Vertigo   • Chronic maxillary sinusitis   • Chest pain, atypical   • Chronic pain of right knee     Past Medical History:   Diagnosis Date   • Asthma    • BPH (benign prostatic hyperplasia)    • COPD (chronic obstructive pulmonary disease) (CMS/HCC)    • COVID-19 vaccine series completed     LAST DOSE GIVEN    • GERD (gastroesophageal reflux disease)    • History of kidney stones    • History of sepsis 2017    BLADDER INFECTION AND PNEUMONIA BOTH TWICE   • Hx of migraine headaches    • Hyperlipidemia    • Hypothyroidism    • Knee pain     RIGHT   • Unsteady gait     D/T RIGHT KNEE   • Weakness     RIGHT KNEE/RIGHT LEG     Past Surgical History:   Procedure Laterality Date   • COLONOSCOPY  2016    benign polyp-repeat 5 years- Filemon Beasley M.D.   • CYSTOSCOPY TRANSURETHRAL RESECTION OF PROSTATE     • CYSTOSCOPY W/ URETERAL STENT PLACEMENT Right 2017    Procedure: CYSTOSCOPY, ureteroscopy,  URETERAL STENT INSERTION, RT;  Surgeon: Shashi Arciniega MD;  Location: [facility];  Service:    • HERNIA REPAIR Right    • KNEE ARTHROPLASTY UNICOMPARTMENTAL Left 2018    Procedure: KNEE ARTHROPLASTY UNICOMPARTMENTAL;  Surgeon: Davin Beasley MD;  Location: [facility];  Service: Orthopedics   • KNEE SURGERY Bilateral     KNEE ARTHROSCOPY X3   • THYROIDECTOMY     • TOTAL KNEE  ARTHROPLASTY Right 4/5/2021    Procedure: TOTAL KNEE ARTHROPLASTY;  Surgeon: Davin Beasley MD;  Location: [facility] OR;  Service: Orthopedics;  Laterality: Right;     General Information     Row Name 04/06/21 1715          Physical Therapy Time and Intention    Document Type  discharge treatment;therapy note (daily note)  -     Mode of Treatment  individual therapy;physical therapy  -     Row Name 04/06/21 1715          General Information    Patient Profile Reviewed  yes  -     Existing Precautions/Restrictions  fall  -     Row Name 04/06/21 1715          Safety Issues, Functional Mobility    Safety Issues Affecting Function (Mobility)  impulsivity  -       User Key  (r) = Recorded By, (t) = Taken By, (c) = Cosigned By    Initials Name Provider Type    Carolina Luna PTA Physical Therapy Assistant        Mobility     Row Name 04/06/21 1716          Bed Mobility    Supine-Sit [place] (Bed Mobility)  standby assist;verbal cues  -     Sit-Supine [place] (Bed Mobility)  not tested  -     Row Name 04/06/21 1716          Sit-Stand Transfer    Sit-Stand [place] (Transfers)  standby assist;verbal cues  -     Assistive Device (Sit-Stand Transfers)  walker, front-wheeled  -     Row Name 04/06/21 1716          Gait/Stairs (Locomotion)    [place] Level (Gait)  contact guard;standby assist;verbal cues  -     Assistive Device (Gait)  walker, front-wheeled  -     Distance in Feet (Gait)  200ft  -     Deviations/Abnormal Patterns (Gait)  antalgic;talia decreased began reciprocal pattern by end of amb at normal pace  -     [place] Level (Stairs)  contact guard;verbal cues  -     Handrail Location (Stairs)  right side (ascending) has 2 rails at home, just can't reach the ones here  -     Number of Steps (Stairs)  10  -JM     Ascending Technique (Stairs)  step-to-step  -     Descending Technique (Stairs)  step-to-step  -       User Key  (r) = Recorded By, (t) =  Taken By, (c) = Cosigned By    Initials Name Provider Type    Carolina Luna PTA Physical Therapy Assistant        Obj/Interventions     Row Name 04/06/21 1719          Range of Motion Comprehensive    Comment, General Range of Motion  R knee -6-92  -     Row Name 04/06/21 1719          Motor Skills    Therapeutic Exercise  -- TKR protocol x 10 reps indep  [facility]       User Key  (r) = Recorded By, (t) = Taken By, (c) = Cosigned By    Initials Name Provider Type    Carolina Luna PTA Physical Therapy Assistant        Goals/Plan    No documentation.       Clinical Impression     Row Name 04/06/21 1719          Pain Scale: Numbers Pre/Post-Treatment    Pretreatment Pain Rating  4/10  -     Posttreatment Pain Rating  7/10  -     Pain Location - Side  Right  -     Pain Location  knee  -     Pain Intervention(s)  Medication (See MAR);Repositioned;Ambulation/increased activity;Elevated;Cold applied  -     Row Name 04/06/21 1719          Plan of Care Review    Plan of Care Reviewed With  patient;spouse  -     Progress  improving  -     Outcome Summary  Pt incr amb dist to 200ft , completed TKR exer protocol x10 reps , completed flight of steps w/CGA and rail-will have 2 rails at home and wife assist; plans HH , then outpt PT  -[facility] Name 04/06/21 1719          Vital Signs    O2 Delivery Pre Treatment  room air  -[facility] Name 04/06/21 1719          Positioning and Restraints    Pre-Treatment Position  in bed  -     Post Treatment Position  chair  -     In Chair  reclined;call light within reach;exit alarm on;encouraged to call for assist;with family/caregiver;notified nsg  -       User Key  (r) = Recorded By, (t) = Taken By, (c) = Cosigned By    Initials Name Provider Type    Carolina Luna PTA Physical Therapy Assistant        Outcome Measures     Row Name 04/06/21 1721          How much help from another person do you currently need...    Turning from your back to your side  while in flat bed without using bedrails?  4  -JM     Moving from lying on back to sitting on the side of a flat bed without bedrails?  3  -JM     Moving to and from a bed to a chair (including a wheelchair)?  4  -JM     Standing up from a chair using your arms (e.g., wheelchair, bedside chair)?  4  -JM     Climbing 3-5 steps with a railing?  3  -JM     To walk in hospital room?  3  -JM     AM-PAC 6 Clicks Score (PT)  21  -JM       User Key  (r) = Recorded By, (t) = Taken By, (c) = Cosigned By    Initials Name Provider Type    Carolina Luna, EARLENE Physical Therapy Assistant        Physical Therapy Education                 Title: PT OT SLP Therapies (Resolved)     Topic: Physical Therapy (Resolved)     Point: Mobility training (Resolved)     Learning Progress Summary           Patient Acceptance, E,D, DU by PC at 4/5/2021 1412                   Point: Home exercise program (Resolved)     Learning Progress Summary           Patient Acceptance, E,D, DU by PC at 4/5/2021 1412                   Point: Body mechanics (Resolved)     Learning Progress Summary           Patient Acceptance, E,D, DU by PC at 4/5/2021 1412                   Point: Precautions (Resolved)     Learning Progress Summary           Patient Acceptance, E,D, DU by PC at 4/5/2021 1412                               User Key     Initials Effective Dates Name Provider Type Discipline    PC 04/03/18 -  Paty Bragg, PT Physical Therapist PT              PT Recommendation and Plan     Plan of Care Reviewed With: patient, spouse  Progress: improving  Outcome Summary: Pt incr amb dist to 200ft , completed TKR exer protocol x10 reps , completed flight of steps w/CGA and rail-will have 2 rails at home and wife assist; plans HH , then outpt PT     Time Calculation:   PT Charges     Row Name 04/06/21 1722             Time Calculation    Start Time  1002  -      Stop Time  1030  -      Time Calculation (min)  28 min  -      PT Received On  04/06/21   -TRA        User Key  (r) = Recorded By, (t) = Taken By, (c) = Cosigned By    Initials Name Provider Type    Carolina Luna PTA Physical Therapy Assistant        Therapy Charges for Today     Code Description Service Date Service Provider Modifiers Qty    32746414824 HC PT THER PROC EA 15 MIN 4/6/2021 Carolina Mota PTA GP 2          PT G-Codes  Outcome Measure Options: AM-PAC 6 Clicks Basic Mobility (PT)  AM-PAC 6 Clicks Score (PT): 21    Carolina Mota PTA  4/6/2021

## 2021-04-06 NOTE — PROGRESS NOTES
"  [facility] DAILY PROGRESS NOTE    Patient Identification  Name: Asa Magaña  Age: 80 y.o.  Sex: male  :  1940  MRN: [id number]    Date: 2021             Subjective:  Interval History:   Yu placed over night  Draining clear/yellow     Objective:    Scheduled Meds:aspirin, 81 mg, Oral, Q12H  finasteride, 5 mg, Oral, Nightly  levothyroxine, 75 mcg, Oral, QAM  mupirocin, , Each Nare, BID  pantoprazole, 40 mg, Oral, QAM  polyethylene glycol, 17 g, Oral, BID      Continuous Infusions:   PRN Meds:HYDROcodone-acetaminophen  •  HYDROcodone-acetaminophen  •  meclizine  •  melatonin  •  ondansetron **OR** ondansetron  •  promethazine    Vital signs in last 24 hours:  Temp:  [96.9 °F (36.1 °C)-97.8 °F (36.6 °C)] 97.1 °F (36.2 °C)  Heart Rate:  [57-76] 68  Resp:  [16] 16  BP: (111-161)/(70-87) 111/74    Intake/Output:    Intake/Output Summary (Last 24 hours) at 2021 0730  Last data filed at 2021 0527  Gross per 24 hour   Intake 1810 ml   Output 2305 ml   Net -495 ml       Exam:  /74 (BP Location: Left arm, Patient Position: Lying)   Pulse 68   Temp 97.1 °F (36.2 °C) (Oral)   Resp 16   Ht 182.9 cm (72\")   Wt 92.2 kg (203 lb 4.8 oz)   SpO2 96%   BMI 27.57 kg/m²     General Appearance:    Alert, cooperative, no distress, appears stated age   Back:     Symmetric, no CVA tenderness   Lungs:     Clear to auscultation bilaterally, respirations unlabored   Heart:    Regular rate and rhythm, strong peripheral pulses   Abdomen:    Soft   Genitalia:   Yu in place, clear/yellow   Extremities:   Extremities normal, atraumatic, no cyanosis or edema   Skin:   Skin color, texture, turgor normal, no rashes or lesions        Data Review:  All labs (24hrs):   Recent Results (from the past 24 hour(s))   Hemoglobin & Hematocrit, Blood    Collection Time: 04/06/21  4:31 AM    Specimen: Blood   Result Value Ref Range    Hemoglobin 14.1 13.0 - 17.7 g/dL    Hematocrit 41.5 37.5 - 51.0 %    "     Assessment:    Chronic pain of right knee      Urinary Retention, Post-op       Plan:      - Maintain Yu catheter x 1 week   - Should Discharge Home with Yu catheter  - Follow-up with [facility] for [facility]ing Trial ([facility]) 1 week after Discharge - Schedulers messaged.  - Urology will sign off    Bryan Alvarado MD  4/6/2021  07:30 EDT

## 2021-04-06 NOTE — PROGRESS NOTES
Continued Stay Note  [facility]     Patient Name: Asa Magaña  MRN: [id number]  Today's Date: 4/6/2021    Admit Date: 4/5/2021    Discharge Plan     Row Name 04/06/21 1134       Plan    Plan  [facility]    Provided Post Acute Provider List?  Yes    Post Acute Provider List  Home Health    Provided Post Acute Provider Quality & Resource List?  Yes    Post Acute Provider Quality and Resource List  Home Health    Delivered To  Patient    Method of Delivery  Telephone    Patient/Family in Agreement with Plan  yes    Plan Comments  Spoke with pt, verified correct information on facesheet and explained the role of CCP. Pt would like to d/c home with [facility], referral given to Jenna with [facility] who states they are able to accept. Plan will be to d/c home with [facility] and family support.    Final Discharge Disposition Code  01 - home or self-care    Final Note  Pt to d/c home with [facility]        Discharge Codes    No documentation.       Expected Discharge Date and Time     Expected Discharge Date Expected Discharge Time    Apr 6, 2021             Naila Aguilar RN

## 2021-04-06 NOTE — DISCHARGE PLACEMENT REQUEST
"Alex Vasquez (80 y.o. Male)     Date of Birth Social Security Number Address Home Phone MRN    1940  [address] [id number] [phone] [id number]    [facility] Status          None        Admission Date Admission Type Admitting Provider Attending Provider Department, Room/Bed    4/5/21 Elective Davin Beasley MD Brown, Reid B, MD [address], P777/1    Discharge Date Discharge Disposition Discharge Destination         Home-Health Care [facility]              Attending Provider: Davin Beasley MD    Allergies: Ciprofloxacin, Quinolones, Terazosin    Isolation: None   Infection: None   Code Status: CPR    Ht: 182.9 cm (72\")   Wt: 92.2 kg (203 lb 4.8 oz)    Admission Cmt: None   Principal Problem: Chronic pain of right knee [M25.561,G89.29] More...                 Active Insurance as of 4/5/2021     Primary Coverage     Payor Plan Insurance Group Employer/Plan Group    HUMANA MEDICARE REPLACEMENT HUMANA MEDICARE REPLACEMENT [id number]     Payor Plan Address Payor Plan Phone Number Payor Plan Fax Number Effective Dates    [address] [address] [phone]  1/1/2021 - None Entered    [facility] [address]       Subscriber Name Subscriber Birth Date Member ID       ALEX VASQUEZ 1940 [id number]                 Emergency Contacts      (Rel.) Home Phone Work Phone Mobile Phone    Bridgett Vasquez (Spouse) [phone] -- [phone]    H[address] Vasquez Dr[address] (Son) [phone] -- [phone]    Antoine Vasquez (Son) -- -- [phone]          "

## 2021-04-06 NOTE — PLAN OF CARE
Goal Outcome Evaluation:  Plan of Care Reviewed With: patient  Progress: improving  Outcome Summary: POD#1 OF RIGHT KNEE. VSS. PO PAIN MEDICATION HELPING WITH PAIN. AMBULATING WELL WITH 1 ASSIST.  PATIENT VOIDING FINE PER KEEN . EDUCATION PROVIDED ON GERD AND OXYGEN MONITORING DUE TO HX OF COPD.  PATIENT VERBALIZED  UNDERSTANDING. WILL CONTINUE TO MONITOR.

## 2021-04-06 NOTE — DISCHARGE SUMMARY
Patient Name: Asa Magaña  Patient YOB: 1940    Date of Admission:  4/5/2021  Date of Discharge:  4/6/2021  Discharge Diagnosis: NM TOTAL KNEE ARTHROPLASTY [72660] (TOTAL KNEE ARTHROPLASTY)  Presenting Problem/History of Present Illness: Chronic pain of right knee [M25.561, G89.29]  Admitting Physician: Dr Davin Beasley  Consults:   Consults     Date and Time Order Name Status Description    4/5/2021  8:10 PM Inpatient Urology Consult Completed           DETAILS OF HOSPITAL STAY:  Patient is a 80 y.o. male was admitted to the floor following the above procedure and underwent an uncomplicated hospital stay.  Patient did well with physical therapy and was ambulating well without problems.  On the day of discharge the wound was clean, dry and intact and calf was soft and non tender and Homans sign was negative.  Patient was tolerating   Diet Instructions     Advance Diet as Tolerated      May advance diet as tolerated while in hospital.    Diet:      Diet Texture / Consistency: Regular       without problems.  Patient will be discharged home.    Condition on Discharge:  Stable    Vital Signs  Temp:  [96.9 °F (36.1 °C)-97.8 °F (36.6 °C)] 97.1 °F (36.2 °C)  Heart Rate:  [57-76] 68  Resp:  [16] 16  BP: (111-161)/(70-87) 111/74    LABS:      Admission on 04/05/2021   Component Date Value Ref Range Status   • Hemoglobin 04/06/2021 14.1  13.0 - 17.7 g/dL Final   • Hematocrit 04/06/2021 41.5  37.5 - 51.0 % Final       No results found.    Discharge Medications     Discharge Medications      New Medications      Instructions Start Date   Juan Low Dose 81 MG EC tablet  Generic drug: aspirin   81 mg, Oral, 2 Times Daily, Start AM the next day following surgery      ClearLax 17 GM/SCOOP powder  Generic drug: polyethylene glycol   17 g, Oral, 2 Times Daily, Dispense 7 day supply      HYDROcodone-acetaminophen 7.5-325 MG per tablet  Commonly known as: NORCO   Take 1-2 tablets by mouth every 4-6 hours as needed for  pain      ondansetron 4 MG tablet  Commonly known as: Zofran   4 mg, Oral, Every 8 Hours PRN      pantoprazole 40 MG EC tablet  Commonly known as: PROTONIX   40 mg, Oral, Daily         Continue These Medications      Instructions Start Date   acetaminophen 325 MG tablet  Commonly known as: TYLENOL   650 mg, Oral, Every 6 Hours PRN      finasteride 5 MG tablet  Commonly known as: PROSCAR   5 mg, Oral, Nightly      fluticasone 27.5 MCG/SPRAY nasal spray  Commonly known as: VERAMYST   2 sprays, Nasal, As Needed      levothyroxine 75 MCG tablet  Commonly known as: SYNTHROID, LEVOTHROID   75 mcg, Oral, Every Morning      meclizine 25 MG tablet  Commonly known as: ANTIVERT   25 mg, Oral, 3 Times Daily PRN      rosuvastatin 5 MG tablet  Commonly known as: CRESTOR   5 mg, Oral, Nightly         Stop These Medications    Chlorhexidine Gluconate 2 % pads     mupirocin 2 % ointment  Commonly known as: BACTROBAN     omeprazole 40 MG capsule  Commonly known as: priLOSEC     Vitamin D3 25 MCG (1000 UT) capsule            Activity at Discharge:   Activity Instructions     Discharge Activity      Dr. Davin Beasley  [address] [place] [address] [address]  [place] [address]  [phone]    Discharge Information (TOTAL KNEE REPLACEMENT)     The first 2-3 days after surgery your pain will likely be diminished due to medications that were given to you during surgery. It is very important to follow a few simple instructions to continue with good pain control after arriving home.    Activity control :  DON'T OVERDO IT, small amounts of activity on a frequent basis, place an emphasis on knee movement rather than prolonged periods of standing or activity.   DO work on bending and moving the knee regularly as the knee and surrounding tissues are solid and will not be injured by motion.  When in bed DON'T leave the knee in a bent position with a pillow under it for long periods. You may place a pillow under the ankle to allow for full extension  (straightening) of the leg.  Ice - you should ice the knee as much as possible over the first week or two.  Placing a clean hand towel or pillowcase between the icepack and skin will allow you to ice for extended periods. If you choose to use a Polar Care machine, make sure the pad is not directly on the skin and check hourly to make sure the skin looks OK. A thin sheet is best betweent he pad and skin if using the ice machine  Pain Medication - Take some pain medication (1-2 tablets) on a regular schedule (every 4-6 hours) for the first 72 hours after arriving home. This is important to keep the pain under control as the operative medication begins to wear off. The block will typically wear off around 48 hours after surgery and an increase in pain will typically occur for around 12 hours. To help with this pain make sure to ice, elevate and take scheduled pain medication every 4 hours if you have increasing pain. Make sure to have food in your stomach when taking the medication. If you develop any nausea with the pain medication, try taking Zofran 30 minutes before taking the pain pills. After the first 72 hours you can take the medication as needed based on your pain.  REMEMBER - PAIN MEDICATION WORKS BETTER AT PREVENTING PAIN THAN IT DOES IN CATCHING UP TO PAIN ONCE IT IS SEVERE.  Physical therapy:  Follow the instructions of your physical therapist.  Place an emphasis on early range of knee motion rather than strengthening    Showering: *The dressing should be left in place until the suction unit stops functioning (typically 7 days).  After the pump stops functioning (green light no longer flashing), you may unscrew the suction unit and tubing at the closest junction to the dressing. This dressing should be left in place as long as it is not saturated and not peeling off.  If waterproof dressing is intact the patient may shower immediately following discharge. If the dressing becomes disloged or saturated it  "should be changed. Please refer to the PRIMITIVO information sheet if you have any questions about the dressing.  If you have a home health nurse or therapist they can be contacted to assist with dressing change or repair. You may also call the PRIMITIVO dressing hotline for questions related to the dressing ([phone]). If there still other problems or questions related to the dressing despite these measures then you can contact Estephania at our office [phone].  After 1 week if the PRIMITIVO dressing is removed,   the wound should be gently cleaned with antibacterial soap then allowed to dry and covered with a dry sterile dressing. The wound should be covered at all times except while showering.  Patient may change dressings daily and prn using sterile 4x4 and paper tape, and should call if any unusual drainage, redness or swelling.*    Driving : No driving during the first 2 weeks post surgery. After the 2 week visit, Dr. Beasley or Fe will determine when you're ready to drive.    Blood Clot (DVT) Prevention:  Keep legs elevated when possible to limit swelling  Perform \"calf pump\" exercises regularly to encourage blood flow through the calf veins.  Most patients will be discharged on aspirin 325mg (full strength) twice daily for 2 weeks, then once daily for four weeks. Some high risk patients may require Coumadin, Xarelto, or other blood thinners.    Follow-Up Visit: After arriving home, please call Dr. Beasley's office ([phone]) to arrange your follow-up appointment. This visit will be approximately 2 weeks post-op. Your staples will be removed at this time.      Your Knee Implant: You have a new knee made of the finest materials available. It is made by [employer] and is called the Legion Oxinium Knee. For more information visit [email or web address]    Patient May Shower; No Tub Baths, Pools or Hot Tubs      Disconnect primitivo suction unit prior to showering    Range of Motion      It is okay to work on " knee range of motion small amounts on a frequent basis in addition to physical therapy work.    Weight Bearing As Tolerated      Weight Bearing Status      Weight Bearing Status: As Tolerated          Discharge Instructions: Patient is to continue with physical therapy exercises daily and continue working with the physical therapist as ordered. Patient may weight bear as tolerated. Apply ice regularly. Patient may ice for long periods of time as long as ice is not directly on the skin. Patient instructed on frequent calf pumping exercises.  Patient also instructed on incentive spirometer during hospitalization and encouraged to continue to use at home regularly.    Dressing: Dressing: The dressing is designed to be left in place until you return to the office in 2 weeks.  You may shower immediately upon return home,  If the dressing becomes disloged or saturated it should be changed.  Underneath the dressing is a sealed mesh strip which also makes the incision waterproof.  It should be left in place.  If for some reason the dressing is removed, the wound can be gently cleaned with antibacterial soap then allowed to dry and covered with a dry sterile dressing if any drainage is present.  Patient may change dressings daily and prn using sterile 4x4 and paper tape, and should call if any unusual drainage, redness or swelling.* Patient may change dressings daily and prn using sterile 4x4 and paper tape, and should call if any unusual drainage, redness or swelling.*    Follow up appointment in 2 weeks - patient to call the office at [phone] to schedule.  Patient will be discharged on aspirin 81mg BID x weeks, then daily x 4weeks    Discharge Diagnosis:    Patient Active Problem List   Diagnosis   • Asthma   • Hyperlipidemia   • Lumbago   • Hypothyroidism   • Hx of migraine headaches   • BPH (benign prostatic hyperplasia)   • Pyelonephritis   • CAP (community acquired pneumonia)   • Sepsis (CMS/HCC)   • Rectal bleeding    • Primary osteoarthritis of left knee   • Vertigo   • Chronic maxillary sinusitis   • Chest pain, atypical   • Chronic pain of right knee       Follow-up Appointments  Future Appointments   Date Time Provider Department Center   4/20/2021 10:20 AM Jerson Beasley MD [facility] [address] [facility]     Additional Instructions for the Follow-ups that You Need to Schedule     Ambulatory Referral to Home Health   As directed      Face to Face Visit Date: 4/5/2021    Follow-up provider for Plan of Care?: I will be treating the patient on an ongoing basis.  Please send me the Plan of Care for signature.    Follow-up provider: JERSON BEASLEY [[id number]]    Reason/Clinical Findings: Right Total Knee Replacement    Describe mobility limitations that make leaving home difficult: recent surgery - knee replacement    Nursing/Therapeutic Services Requested: Physical Therapy    PT orders: Total joint pathway    Frequency: 1 Week 1         Ambulatory Referral to Physical Therapy Evaluate and treat, POST OP   As directed      Post op right TKA    Order Comments: Post op right TKA     Specialty needed: Evaluate and treat POST OP         Apply Ice to Affected Knee Every 2 Hours   As directed      Discharge Follow-up with Specified Provider: Dr Jerson Beasley; 2 Weeks   As directed      To: Dr Jerson Beasley    Follow Up: 2 Weeks    Follow Up Details: call [phone] for appointment         Remove Dressing   As directed      Leave shellie ON UNTIL 2 WEEK F/U. May remove dressing if dressing is saturated or if peeling off.  If dressing is removed in the postoperative period the wound should be covered with gauze and tape.  If dressing is removed may shower after 1 week.    Order Comments: Leave shellie ON UNTIL 2 WEEK F/U. May remove dressing if dressing is saturated or if peeling off.  If dressing is removed in the postoperative period the wound should be covered with gauze and tape.  If dressing is removed may shower after 1 week.          Use Commercial Ice Wrap    As directed               YOANDY Sandoval  04/06/21  08:07 EDT

## 2021-04-20 ENCOUNTER — OFFICE VISIT (OUTPATIENT)
Dept: ORTHOPEDIC SURGERY | Facility: CLINIC | Age: 81
End: 2021-04-20

## 2021-04-20 VITALS — TEMPERATURE: 97.3 F | HEIGHT: 72 IN | BODY MASS INDEX: 28.44 KG/M2 | WEIGHT: 210 LBS

## 2021-04-20 DIAGNOSIS — Z96.651 S/P TKR (TOTAL KNEE REPLACEMENT), RIGHT: Primary | ICD-10-CM

## 2021-04-20 PROCEDURE — 99024 POSTOP FOLLOW-UP VISIT: CPT | Performed by: ORTHOPAEDIC SURGERY

## 2021-04-20 RX ORDER — HYDROCODONE BITARTRATE AND ACETAMINOPHEN 7.5; 325 MG/1; MG/1
TABLET ORAL
Qty: 20 TABLET | Refills: 0 | Status: SHIPPED | OUTPATIENT
Start: 2021-04-20

## 2021-04-20 NOTE — PROGRESS NOTES
Asa Magaña : 1940 MRN: [id number] DATE: 2021    DIAGNOSIS: 2 week follow up right total knee      SUBJECTIVE:Patient returns today for 2 week follow up of right total knee replacement. Patient reports doing well with no unusual complaints. Appears to be progressing appropriately.    OBJECTIVE:   Exam:. The incision is healing appropriately. No sign of infection. Range of motion is progressing as expected. The calf is soft and nontender with a negative Homans sign.    ASSESSMENT: 2 week status post right knee replacement.    PLAN: 1) Optifoam dressing removed and fusion mesh still intact   2) Order given for PT   3) Discontinue WONG hose   4) Continue ice PRN   5) aspirin 81 mg orally every day for 1 month   6) Follow up in 6 weeks with repeat Xrays of right knee (3views)   7) will refill [facility] pain med rx    Torito Pedersen, APRN  2021     This patient was seen in conjunction today with Dr. Davin Beasley.  Dr. Beasley agrees with the above-stated assessment and plan.

## 2021-04-22 ENCOUNTER — TELEPHONE (OUTPATIENT)
Dept: ORTHOPEDIC SURGERY | Facility: CLINIC | Age: 81
End: 2021-04-22

## 2021-04-22 NOTE — TELEPHONE ENCOUNTER
----- Message from Mere Smith MA sent at 4/20/2021  4:52 PM EDT -----  Regarding: FW: Non-Urgent Medical Question  Contact: [phone]    ----- Message -----  From: Asa Magaña  Sent: 4/20/2021   4:20 PM EDT  To: [facility]  Subject: Non-Urgent Medical Question                      Dr. Beasley    Do you   mind sending my son Harrison a copy of the X Rays of my new knee we saw in your office today.  Thanks  Rachid Magaña

## 2021-04-26 ENCOUNTER — TELEPHONE (OUTPATIENT)
Dept: ORTHOPEDIC SURGERY | Facility: HOSPITAL | Age: 81
End: 2021-04-26

## 2021-04-26 NOTE — TELEPHONE ENCOUNTER
Called and spoke with Mr. Magaña as he is 3 weeks SP total knee. Pt states that he is doing well. He no longer uses the cane and last night was the first night that he didn't take a pain pill. He states incision site looks good. Continues to work with PT. Getting better by the day. My contact information was given to the patient if questions/concerns arise. Mr. Magaña verbalized understanding.

## 2021-05-27 ENCOUNTER — TELEPHONE (OUTPATIENT)
Dept: ORTHOPEDIC SURGERY | Facility: CLINIC | Age: 81
End: 2021-05-27

## 2021-05-27 NOTE — TELEPHONE ENCOUNTER
Please call him to let him know we can have a handicap placard paperwork filled out at the  for him

## 2021-05-27 NOTE — TELEPHONE ENCOUNTER
Caller: YOVANNY VASQUEZ    Relationship: SELF    Best call back number: [phone]    What form or medical record are you requesting: HANDICAP STICKER/TAG    Additional notes: PATIENT WOULD LIKE A CALL BACK TO DISCUSS GETTING A HANDICAP STICKER/TAG DUE TO ISSUE WITH RT KNEE.

## 2021-06-03 ENCOUNTER — OFFICE VISIT (OUTPATIENT)
Dept: ORTHOPEDIC SURGERY | Facility: CLINIC | Age: 81
End: 2021-06-03

## 2021-06-03 VITALS — WEIGHT: 195 LBS | BODY MASS INDEX: 26.41 KG/M2 | TEMPERATURE: 96.9 F | HEIGHT: 72 IN

## 2021-06-03 DIAGNOSIS — R52 PAIN: Primary | ICD-10-CM

## 2021-06-03 DIAGNOSIS — Z96.651 S/P TKR (TOTAL KNEE REPLACEMENT), RIGHT: ICD-10-CM

## 2021-06-03 PROCEDURE — 73562 X-RAY EXAM OF KNEE 3: CPT | Performed by: NURSE PRACTITIONER

## 2021-06-03 PROCEDURE — 99024 POSTOP FOLLOW-UP VISIT: CPT | Performed by: NURSE PRACTITIONER

## 2021-06-03 RX ORDER — CYCLOBENZAPRINE HCL 5 MG
TABLET ORAL
Qty: 20 TABLET | Refills: 0 | Status: SHIPPED | OUTPATIENT
Start: 2021-06-03

## 2021-06-03 NOTE — PROGRESS NOTES
Asa Magaña : 1940 MRN: [id number] DATE: 6/3/2021    DIAGNOSIS: 8 week follow up right total knee      SUBJECTIVE:Patient returns today for 8 week follow up of right total knee replacement. Patient reports doing well with no unusual complaints. Appears to be progressing appropriately. Reports he is having some severe muscle aches and cramps in his right leg.    OBJECTIVE:   Exam:. The incision is well healed. No sign of infection. Range of motion is measured at 3 to 110. The calf is soft and nontender with a negative Homans sign. Strength is progressing and the patient is ambulating appropriately.    DIAGNOSTIC STUDIES  Xrays: 3 views of the right knee (AP, lateral, and sunrise) were ordered and reviewed for evaluation of recent knee replacement. They demonstrate a well positioned, well aligned knee replacement without complicating factors noted. In comparison with previous films there has been no change.    ASSESSMENT: 8 week status post right knee replacement.    PLAN: 1) Continue with PT exercises as prescribed   2) Flexeril 5mg po q nightly prn muscle cramps/spasms   3) F/u in 10 months for annual visit.    Torito Pedersen, YOANDY  6/3/2021

## 2022-02-24 ENCOUNTER — TRANSCRIBE ORDERS (OUTPATIENT)
Dept: ADMINISTRATIVE | Facility: HOSPITAL | Age: 82
End: 2022-02-24

## 2022-02-24 DIAGNOSIS — R13.10 DYSPHAGIA, UNSPECIFIED TYPE: Primary | ICD-10-CM

## 2022-04-21 ENCOUNTER — OFFICE VISIT (OUTPATIENT)
Dept: ORTHOPEDIC SURGERY | Facility: CLINIC | Age: 82
End: 2022-04-21

## 2022-04-21 VITALS — HEIGHT: 71 IN | TEMPERATURE: 96.7 F | BODY MASS INDEX: 28.36 KG/M2 | WEIGHT: 202.6 LBS

## 2022-04-21 DIAGNOSIS — Z96.651 S/P TKR (TOTAL KNEE REPLACEMENT), RIGHT: Primary | ICD-10-CM

## 2022-04-21 DIAGNOSIS — G89.29 CHRONIC PAIN OF RIGHT KNEE: ICD-10-CM

## 2022-04-21 DIAGNOSIS — M25.561 CHRONIC PAIN OF RIGHT KNEE: ICD-10-CM

## 2022-04-21 PROCEDURE — 99214 OFFICE O/P EST MOD 30 MIN: CPT | Performed by: NURSE PRACTITIONER

## 2022-04-21 PROCEDURE — 73562 X-RAY EXAM OF KNEE 3: CPT | Performed by: NURSE PRACTITIONER

## 2022-04-21 RX ORDER — IBUPROFEN 800 MG/1
TABLET ORAL
COMMUNITY

## 2022-04-21 RX ORDER — FINASTERIDE 5 MG/1
1 TABLET, FILM COATED ORAL DAILY
COMMUNITY
Start: 2022-03-28

## 2022-04-21 RX ORDER — LEVOTHYROXINE SODIUM 0.07 MG/1
1 TABLET ORAL DAILY
COMMUNITY
Start: 2021-12-07

## 2022-04-21 RX ORDER — CEPHALEXIN 500 MG/1
CAPSULE ORAL
Qty: 4 CAPSULE | Refills: 3 | Status: SHIPPED | OUTPATIENT
Start: 2022-04-21

## 2022-04-21 NOTE — PROGRESS NOTES
Patient: Asa Magaña  YOB: 1940 81 y.o. male  Medical Record Number: [id number]    Chief Complaints:   Chief Complaint   Patient presents with   • Right Knee - Follow-up       History of Present Illness:Asa Magaña is a 81 y.o. male who presents with complaints of intermittent right knee pain.  He is a year out from right total knee replacement and has been doing well, he is concerned because with certain motions his right knee becomes very painful but that is intermittent.  He denies any specific injury.  Denies any recent illness.    Allergies:   Allergies   Allergen Reactions   • Ciprofloxacin Hives and Palpitations   • Quinolones Hives and Palpitations   • Terazosin Itching and Rash       Medications:   Current Outpatient Medications   Medication Sig Dispense Refill   • acetaminophen (TYLENOL) 325 MG tablet Take 650 mg by mouth Every 6 (Six) Hours As Needed for Mild Pain .     • calcium citrate-vitamin d (CALCITRATE) 315-250 MG-UNIT tablet tablet Take  by mouth.     • finasteride (PROSCAR) 5 MG tablet Take 1 tablet by mouth Daily.     • fluticasone (VERAMYST) 27.5 MCG/SPRAY nasal spray 2 sprays into the nostril(s) as directed by provider As Needed.     • levothyroxine (SYNTHROID, LEVOTHROID) 75 MCG tablet Take 75 mcg by mouth Every Morning.     • meclizine (ANTIVERT) 25 MG tablet Take 1 tablet by mouth 3 (Three) Times a Day As Needed for Dizziness. 30 tablet 0   • rosuvastatin (CRESTOR) 5 MG tablet Take 5 mg by mouth Every Night.     • cyclobenzaprine (FLEXERIL) 5 MG tablet Take 1 tab po q nightly prn 20 tablet 0   • finasteride (PROSCAR) 5 MG tablet Take 5 mg by mouth Every Night.     • HYDROcodone-acetaminophen (NORCO) 7.5-325 MG per tablet Take 1 tablet by mouth every 4-6 hours as needed for pain 20 tablet 0   • ibuprofen (ADVIL,MOTRIN) 800 MG tablet Take  by mouth.     • levothyroxine (SYNTHROID, LEVOTHROID) 75 MCG tablet Take 1 tablet by mouth Daily.     • ondansetron (Zofran) 4 MG  "tablet Take 1 tablet by mouth Every 8 (Eight) Hours As Needed for Nausea or Vomiting for up to 10 doses. 10 tablet 0     No current facility-administered medications for this visit.     Facility-Administered Medications Ordered in Other Visits   Medication Dose Route Frequency Provider Last Rate Last Admin   • Chlorhexidine Gluconate 2 % pads 1 each  1 pad Apply externally Take As Directed Davin Beasley MD       • mupirocin (BACTROBAN) 2 % nasal ointment   Nasal BID Davin Beasley MD             The following portions of the patient's history were reviewed and updated as appropriate: allergies, current medications, past family history, past medical history, past social history, past surgical history and problem list.    Review of Systems:   A 14 point review of systems was performed. All systems negative except pertinent positives/negative listed in HPI above    Physical Exam:   Vitals:    04/21/22 0904   Temp: 96.7 °F (35.9 °C)   Weight: 91.9 kg (202 lb 9.6 oz)   Height: 180.3 cm (71\")       General: A and O x 3, ASA, NAD    SCLERA:    Normal    Skin clear no unusual lesions noted  Right knee patient has a well-healed surgical incision noted 120 degrees flexion neutral in extension with no instability calf is soft and nontender       Radiology:  Xrays 3views (ap,lateral, sunrise) knee were ordered and reviewed today secondary to pain show well-placed well-positioned right total knee replacement compared to views are unchanged    Assessment/Plan: Right knee pain  Status post right TKA    Patient discussed options, his knee replacement looks great, pain he has having is only intermittent and related to certain motions, I think it would be helpful for her to make definitely continue with physical therapy exercises.  Patient was given samples of Pennsaid gel to use twice a day and let me know if he would like a prescription.  We will see him back for follow-up in 2 years for both knees will call in the meantime if his " symptoms worsen.  In addition prescription given for antibiotic for dental procedure      Fe Coker, APRN  4/21/2022

## 2024-03-05 VITALS
SYSTOLIC BLOOD PRESSURE: 136 MMHG | HEART RATE: 57 BPM | SYSTOLIC BLOOD PRESSURE: 119 MMHG | DIASTOLIC BLOOD PRESSURE: 57 MMHG | TEMPERATURE: 97.2 F | OXYGEN SATURATION: 98 % | OXYGEN SATURATION: 94 % | SYSTOLIC BLOOD PRESSURE: 111 MMHG | RESPIRATION RATE: 16 BRPM | SYSTOLIC BLOOD PRESSURE: 96 MMHG | RESPIRATION RATE: 17 BRPM | DIASTOLIC BLOOD PRESSURE: 49 MMHG | SYSTOLIC BLOOD PRESSURE: 101 MMHG | RESPIRATION RATE: 11 BRPM | DIASTOLIC BLOOD PRESSURE: 56 MMHG | HEART RATE: 58 BPM | SYSTOLIC BLOOD PRESSURE: 117 MMHG | SYSTOLIC BLOOD PRESSURE: 108 MMHG | DIASTOLIC BLOOD PRESSURE: 63 MMHG | DIASTOLIC BLOOD PRESSURE: 74 MMHG | SYSTOLIC BLOOD PRESSURE: 112 MMHG | SYSTOLIC BLOOD PRESSURE: 148 MMHG | SYSTOLIC BLOOD PRESSURE: 98 MMHG | DIASTOLIC BLOOD PRESSURE: 48 MMHG | DIASTOLIC BLOOD PRESSURE: 83 MMHG | HEART RATE: 56 BPM | DIASTOLIC BLOOD PRESSURE: 65 MMHG | OXYGEN SATURATION: 99 % | SYSTOLIC BLOOD PRESSURE: 104 MMHG | SYSTOLIC BLOOD PRESSURE: 102 MMHG | HEART RATE: 64 BPM | DIASTOLIC BLOOD PRESSURE: 64 MMHG | SYSTOLIC BLOOD PRESSURE: 105 MMHG | HEART RATE: 59 BPM | HEART RATE: 61 BPM | HEART RATE: 63 BPM | SYSTOLIC BLOOD PRESSURE: 155 MMHG | HEART RATE: 66 BPM | DIASTOLIC BLOOD PRESSURE: 76 MMHG | OXYGEN SATURATION: 97 % | RESPIRATION RATE: 15 BRPM | RESPIRATION RATE: 14 BRPM | DIASTOLIC BLOOD PRESSURE: 50 MMHG | RESPIRATION RATE: 18 BRPM | SYSTOLIC BLOOD PRESSURE: 95 MMHG | OXYGEN SATURATION: 95 % | DIASTOLIC BLOOD PRESSURE: 55 MMHG | DIASTOLIC BLOOD PRESSURE: 54 MMHG | DIASTOLIC BLOOD PRESSURE: 53 MMHG | HEART RATE: 88 BPM | SYSTOLIC BLOOD PRESSURE: 128 MMHG | SYSTOLIC BLOOD PRESSURE: 113 MMHG | RESPIRATION RATE: 12 BRPM | HEART RATE: 68 BPM | DIASTOLIC BLOOD PRESSURE: 58 MMHG | DIASTOLIC BLOOD PRESSURE: 70 MMHG | RESPIRATION RATE: 13 BRPM | SYSTOLIC BLOOD PRESSURE: 103 MMHG | HEART RATE: 62 BPM | WEIGHT: 210 LBS

## 2024-03-07 PROBLEM — Z86.010 PERSONAL HISTORY OF COLONIC POLYPS: Status: ACTIVE | Noted: 2024-03-08

## 2024-03-08 ENCOUNTER — AMBULATORY SURGICAL CENTER (AMBULATORY)
Dept: URBAN - METROPOLITAN AREA SURGERY 17 | Facility: SURGERY | Age: 84
End: 2024-03-08
Payer: MEDICARE

## 2024-03-08 ENCOUNTER — OFFICE (AMBULATORY)
Dept: URBAN - METROPOLITAN AREA PATHOLOGY 4 | Facility: PATHOLOGY | Age: 84
End: 2024-03-08

## 2024-03-08 DIAGNOSIS — Z09 ENCOUNTER FOR FOLLOW-UP EXAMINATION AFTER COMPLETED TREATMEN: ICD-10-CM

## 2024-03-08 DIAGNOSIS — D12.2 BENIGN NEOPLASM OF ASCENDING COLON: ICD-10-CM

## 2024-03-08 DIAGNOSIS — D12.5 BENIGN NEOPLASM OF SIGMOID COLON: ICD-10-CM

## 2024-03-08 DIAGNOSIS — Z86.010 PERSONAL HISTORY OF COLONIC POLYPS: ICD-10-CM

## 2024-03-08 DIAGNOSIS — K57.30 DIVERTICULOSIS OF LARGE INTESTINE WITHOUT PERFORATION OR ABS: ICD-10-CM

## 2024-03-08 PROBLEM — K63.5 POLYP OF COLON: Status: ACTIVE | Noted: 2024-03-08

## 2024-03-08 PROBLEM — D17.79 BENIGN LIPOMATOUS NEOPLASM OF OTHER SITES: Status: ACTIVE | Noted: 2024-03-08

## 2024-03-08 LAB
GI HISTOLOGY: A. ASCENDING COLON: (no result)
GI HISTOLOGY: B. SIGMOID COLON: (no result)
GI HISTOLOGY: PDF REPORT: (no result)

## 2024-03-08 PROCEDURE — 45390 COLONOSCOPY W/RESECTION: CPT | Mod: PT,59 | Performed by: INTERNAL MEDICINE

## 2024-03-08 PROCEDURE — 88305 TISSUE EXAM BY PATHOLOGIST: CPT | Performed by: PATHOLOGY

## 2024-03-08 PROCEDURE — 45390 COLONOSCOPY W/RESECTION: CPT | Mod: 59,PT | Performed by: INTERNAL MEDICINE

## 2024-03-08 PROCEDURE — 45385 COLONOSCOPY W/LESION REMOVAL: CPT | Mod: PT | Performed by: INTERNAL MEDICINE

## 2024-03-08 NOTE — SERVICEHPINOTES
83-year-old gentleman without GI complaints.  Family history is negative for polyps or colon cancer.  He does have a personal history of polyps and presents for a follow-up colonoscopy.

## 2024-09-19 VITALS
DIASTOLIC BLOOD PRESSURE: 55 MMHG | SYSTOLIC BLOOD PRESSURE: 134 MMHG | HEART RATE: 55 BPM | DIASTOLIC BLOOD PRESSURE: 51 MMHG | DIASTOLIC BLOOD PRESSURE: 62 MMHG | HEART RATE: 63 BPM | DIASTOLIC BLOOD PRESSURE: 53 MMHG | HEART RATE: 56 BPM | RESPIRATION RATE: 22 BRPM | DIASTOLIC BLOOD PRESSURE: 63 MMHG | RESPIRATION RATE: 17 BRPM | DIASTOLIC BLOOD PRESSURE: 53 MMHG | DIASTOLIC BLOOD PRESSURE: 67 MMHG | HEART RATE: 65 BPM | SYSTOLIC BLOOD PRESSURE: 104 MMHG | DIASTOLIC BLOOD PRESSURE: 52 MMHG | HEART RATE: 58 BPM | HEART RATE: 60 BPM | HEART RATE: 54 BPM | DIASTOLIC BLOOD PRESSURE: 64 MMHG | WEIGHT: 210 LBS | HEART RATE: 63 BPM | SYSTOLIC BLOOD PRESSURE: 125 MMHG | RESPIRATION RATE: 16 BRPM | DIASTOLIC BLOOD PRESSURE: 62 MMHG | DIASTOLIC BLOOD PRESSURE: 63 MMHG | DIASTOLIC BLOOD PRESSURE: 55 MMHG | HEART RATE: 62 BPM | DIASTOLIC BLOOD PRESSURE: 70 MMHG | HEART RATE: 59 BPM | DIASTOLIC BLOOD PRESSURE: 70 MMHG | TEMPERATURE: 97.7 F | DIASTOLIC BLOOD PRESSURE: 70 MMHG | RESPIRATION RATE: 14 BRPM | DIASTOLIC BLOOD PRESSURE: 51 MMHG | DIASTOLIC BLOOD PRESSURE: 55 MMHG | HEART RATE: 59 BPM | HEART RATE: 54 BPM | RESPIRATION RATE: 22 BRPM | SYSTOLIC BLOOD PRESSURE: 134 MMHG | HEART RATE: 54 BPM | OXYGEN SATURATION: 96 % | RESPIRATION RATE: 14 BRPM | HEART RATE: 65 BPM | OXYGEN SATURATION: 98 % | SYSTOLIC BLOOD PRESSURE: 124 MMHG | SYSTOLIC BLOOD PRESSURE: 109 MMHG | SYSTOLIC BLOOD PRESSURE: 112 MMHG | HEART RATE: 56 BPM | OXYGEN SATURATION: 98 % | HEART RATE: 65 BPM | SYSTOLIC BLOOD PRESSURE: 112 MMHG | HEART RATE: 58 BPM | RESPIRATION RATE: 18 BRPM | TEMPERATURE: 97.4 F | DIASTOLIC BLOOD PRESSURE: 64 MMHG | DIASTOLIC BLOOD PRESSURE: 53 MMHG | DIASTOLIC BLOOD PRESSURE: 70 MMHG | HEART RATE: 61 BPM | HEART RATE: 60 BPM | SYSTOLIC BLOOD PRESSURE: 107 MMHG | HEART RATE: 55 BPM | OXYGEN SATURATION: 96 % | TEMPERATURE: 97.4 F | SYSTOLIC BLOOD PRESSURE: 134 MMHG | HEART RATE: 60 BPM | HEART RATE: 55 BPM | DIASTOLIC BLOOD PRESSURE: 70 MMHG | DIASTOLIC BLOOD PRESSURE: 57 MMHG | RESPIRATION RATE: 16 BRPM | RESPIRATION RATE: 16 BRPM | HEART RATE: 52 BPM | SYSTOLIC BLOOD PRESSURE: 109 MMHG | DIASTOLIC BLOOD PRESSURE: 55 MMHG | DIASTOLIC BLOOD PRESSURE: 68 MMHG | SYSTOLIC BLOOD PRESSURE: 134 MMHG | SYSTOLIC BLOOD PRESSURE: 131 MMHG | TEMPERATURE: 97.7 F | DIASTOLIC BLOOD PRESSURE: 53 MMHG | RESPIRATION RATE: 18 BRPM | DIASTOLIC BLOOD PRESSURE: 51 MMHG | OXYGEN SATURATION: 96 % | DIASTOLIC BLOOD PRESSURE: 51 MMHG | DIASTOLIC BLOOD PRESSURE: 57 MMHG | DIASTOLIC BLOOD PRESSURE: 57 MMHG | HEART RATE: 60 BPM | DIASTOLIC BLOOD PRESSURE: 75 MMHG | SYSTOLIC BLOOD PRESSURE: 104 MMHG | DIASTOLIC BLOOD PRESSURE: 52 MMHG | DIASTOLIC BLOOD PRESSURE: 52 MMHG | SYSTOLIC BLOOD PRESSURE: 114 MMHG | SYSTOLIC BLOOD PRESSURE: 112 MMHG | SYSTOLIC BLOOD PRESSURE: 124 MMHG | SYSTOLIC BLOOD PRESSURE: 131 MMHG | HEART RATE: 63 BPM | SYSTOLIC BLOOD PRESSURE: 131 MMHG | HEART RATE: 63 BPM | SYSTOLIC BLOOD PRESSURE: 114 MMHG | RESPIRATION RATE: 17 BRPM | HEART RATE: 61 BPM | OXYGEN SATURATION: 97 % | RESPIRATION RATE: 17 BRPM | RESPIRATION RATE: 18 BRPM | DIASTOLIC BLOOD PRESSURE: 52 MMHG | OXYGEN SATURATION: 94 % | DIASTOLIC BLOOD PRESSURE: 64 MMHG | SYSTOLIC BLOOD PRESSURE: 107 MMHG | SYSTOLIC BLOOD PRESSURE: 120 MMHG | SYSTOLIC BLOOD PRESSURE: 125 MMHG | SYSTOLIC BLOOD PRESSURE: 100 MMHG | WEIGHT: 210 LBS | SYSTOLIC BLOOD PRESSURE: 100 MMHG | SYSTOLIC BLOOD PRESSURE: 112 MMHG | DIASTOLIC BLOOD PRESSURE: 63 MMHG | DIASTOLIC BLOOD PRESSURE: 63 MMHG | TEMPERATURE: 97.7 F | DIASTOLIC BLOOD PRESSURE: 68 MMHG | DIASTOLIC BLOOD PRESSURE: 62 MMHG | OXYGEN SATURATION: 94 % | SYSTOLIC BLOOD PRESSURE: 134 MMHG | HEART RATE: 56 BPM | TEMPERATURE: 97.4 F | SYSTOLIC BLOOD PRESSURE: 114 MMHG | HEART RATE: 55 BPM | HEART RATE: 61 BPM | OXYGEN SATURATION: 98 % | HEART RATE: 61 BPM | HEART RATE: 52 BPM | RESPIRATION RATE: 17 BRPM | DIASTOLIC BLOOD PRESSURE: 75 MMHG | OXYGEN SATURATION: 97 % | OXYGEN SATURATION: 97 % | OXYGEN SATURATION: 96 % | SYSTOLIC BLOOD PRESSURE: 120 MMHG | SYSTOLIC BLOOD PRESSURE: 107 MMHG | HEART RATE: 62 BPM | HEART RATE: 63 BPM | SYSTOLIC BLOOD PRESSURE: 107 MMHG | SYSTOLIC BLOOD PRESSURE: 124 MMHG | WEIGHT: 210 LBS | OXYGEN SATURATION: 96 % | DIASTOLIC BLOOD PRESSURE: 53 MMHG | HEART RATE: 62 BPM | RESPIRATION RATE: 17 BRPM | DIASTOLIC BLOOD PRESSURE: 70 MMHG | DIASTOLIC BLOOD PRESSURE: 63 MMHG | SYSTOLIC BLOOD PRESSURE: 131 MMHG | SYSTOLIC BLOOD PRESSURE: 114 MMHG | HEART RATE: 56 BPM | OXYGEN SATURATION: 94 % | RESPIRATION RATE: 22 BRPM | DIASTOLIC BLOOD PRESSURE: 51 MMHG | SYSTOLIC BLOOD PRESSURE: 124 MMHG | HEART RATE: 62 BPM | RESPIRATION RATE: 18 BRPM | SYSTOLIC BLOOD PRESSURE: 120 MMHG | DIASTOLIC BLOOD PRESSURE: 64 MMHG | HEART RATE: 56 BPM | DIASTOLIC BLOOD PRESSURE: 57 MMHG | DIASTOLIC BLOOD PRESSURE: 62 MMHG | OXYGEN SATURATION: 97 % | HEART RATE: 59 BPM | HEART RATE: 62 BPM | WEIGHT: 210 LBS | HEART RATE: 65 BPM | TEMPERATURE: 97.7 F | HEART RATE: 63 BPM | SYSTOLIC BLOOD PRESSURE: 125 MMHG | DIASTOLIC BLOOD PRESSURE: 51 MMHG | RESPIRATION RATE: 22 BRPM | SYSTOLIC BLOOD PRESSURE: 131 MMHG | RESPIRATION RATE: 18 BRPM | TEMPERATURE: 97.4 F | DIASTOLIC BLOOD PRESSURE: 68 MMHG | SYSTOLIC BLOOD PRESSURE: 125 MMHG | HEART RATE: 55 BPM | SYSTOLIC BLOOD PRESSURE: 100 MMHG | DIASTOLIC BLOOD PRESSURE: 63 MMHG | RESPIRATION RATE: 14 BRPM | HEART RATE: 60 BPM | SYSTOLIC BLOOD PRESSURE: 112 MMHG | DIASTOLIC BLOOD PRESSURE: 68 MMHG | HEART RATE: 54 BPM | OXYGEN SATURATION: 97 % | HEART RATE: 59 BPM | TEMPERATURE: 97.7 F | HEART RATE: 58 BPM | DIASTOLIC BLOOD PRESSURE: 75 MMHG | HEART RATE: 58 BPM | DIASTOLIC BLOOD PRESSURE: 55 MMHG | DIASTOLIC BLOOD PRESSURE: 57 MMHG | HEART RATE: 52 BPM | WEIGHT: 210 LBS | SYSTOLIC BLOOD PRESSURE: 107 MMHG | DIASTOLIC BLOOD PRESSURE: 53 MMHG | DIASTOLIC BLOOD PRESSURE: 62 MMHG | SYSTOLIC BLOOD PRESSURE: 104 MMHG | OXYGEN SATURATION: 98 % | WEIGHT: 210 LBS | RESPIRATION RATE: 22 BRPM | OXYGEN SATURATION: 98 % | OXYGEN SATURATION: 96 % | RESPIRATION RATE: 22 BRPM | SYSTOLIC BLOOD PRESSURE: 114 MMHG | SYSTOLIC BLOOD PRESSURE: 100 MMHG | HEART RATE: 60 BPM | DIASTOLIC BLOOD PRESSURE: 67 MMHG | OXYGEN SATURATION: 97 % | HEART RATE: 52 BPM | SYSTOLIC BLOOD PRESSURE: 124 MMHG | RESPIRATION RATE: 14 BRPM | DIASTOLIC BLOOD PRESSURE: 70 MMHG | DIASTOLIC BLOOD PRESSURE: 62 MMHG | SYSTOLIC BLOOD PRESSURE: 124 MMHG | SYSTOLIC BLOOD PRESSURE: 112 MMHG | DIASTOLIC BLOOD PRESSURE: 67 MMHG | HEART RATE: 52 BPM | SYSTOLIC BLOOD PRESSURE: 125 MMHG | DIASTOLIC BLOOD PRESSURE: 75 MMHG | RESPIRATION RATE: 18 BRPM | SYSTOLIC BLOOD PRESSURE: 109 MMHG | HEART RATE: 61 BPM | TEMPERATURE: 97.4 F | OXYGEN SATURATION: 94 % | SYSTOLIC BLOOD PRESSURE: 125 MMHG | SYSTOLIC BLOOD PRESSURE: 134 MMHG | DIASTOLIC BLOOD PRESSURE: 55 MMHG | DIASTOLIC BLOOD PRESSURE: 68 MMHG | SYSTOLIC BLOOD PRESSURE: 109 MMHG | HEART RATE: 65 BPM | SYSTOLIC BLOOD PRESSURE: 104 MMHG | HEART RATE: 59 BPM | DIASTOLIC BLOOD PRESSURE: 51 MMHG | RESPIRATION RATE: 17 BRPM | DIASTOLIC BLOOD PRESSURE: 57 MMHG | DIASTOLIC BLOOD PRESSURE: 64 MMHG | DIASTOLIC BLOOD PRESSURE: 67 MMHG | SYSTOLIC BLOOD PRESSURE: 100 MMHG | RESPIRATION RATE: 16 BRPM | WEIGHT: 210 LBS | SYSTOLIC BLOOD PRESSURE: 104 MMHG | TEMPERATURE: 97.4 F | HEART RATE: 56 BPM | SYSTOLIC BLOOD PRESSURE: 104 MMHG | DIASTOLIC BLOOD PRESSURE: 75 MMHG | SYSTOLIC BLOOD PRESSURE: 131 MMHG | HEART RATE: 62 BPM | OXYGEN SATURATION: 98 % | DIASTOLIC BLOOD PRESSURE: 75 MMHG | SYSTOLIC BLOOD PRESSURE: 134 MMHG | DIASTOLIC BLOOD PRESSURE: 57 MMHG | OXYGEN SATURATION: 94 % | DIASTOLIC BLOOD PRESSURE: 55 MMHG | HEART RATE: 52 BPM | HEART RATE: 58 BPM | RESPIRATION RATE: 14 BRPM | SYSTOLIC BLOOD PRESSURE: 100 MMHG | DIASTOLIC BLOOD PRESSURE: 63 MMHG | HEART RATE: 52 BPM | HEART RATE: 62 BPM | RESPIRATION RATE: 14 BRPM | SYSTOLIC BLOOD PRESSURE: 112 MMHG | DIASTOLIC BLOOD PRESSURE: 67 MMHG | HEART RATE: 58 BPM | RESPIRATION RATE: 17 BRPM | TEMPERATURE: 97.4 F | DIASTOLIC BLOOD PRESSURE: 67 MMHG | OXYGEN SATURATION: 96 % | DIASTOLIC BLOOD PRESSURE: 53 MMHG | SYSTOLIC BLOOD PRESSURE: 120 MMHG | SYSTOLIC BLOOD PRESSURE: 114 MMHG | SYSTOLIC BLOOD PRESSURE: 124 MMHG | DIASTOLIC BLOOD PRESSURE: 64 MMHG | OXYGEN SATURATION: 97 % | DIASTOLIC BLOOD PRESSURE: 64 MMHG | SYSTOLIC BLOOD PRESSURE: 100 MMHG | HEART RATE: 54 BPM | SYSTOLIC BLOOD PRESSURE: 120 MMHG | DIASTOLIC BLOOD PRESSURE: 52 MMHG | HEART RATE: 59 BPM | HEART RATE: 54 BPM | HEART RATE: 65 BPM | SYSTOLIC BLOOD PRESSURE: 131 MMHG | DIASTOLIC BLOOD PRESSURE: 68 MMHG | HEART RATE: 65 BPM | RESPIRATION RATE: 22 BRPM | RESPIRATION RATE: 16 BRPM | TEMPERATURE: 97.7 F | SYSTOLIC BLOOD PRESSURE: 114 MMHG | RESPIRATION RATE: 18 BRPM | SYSTOLIC BLOOD PRESSURE: 109 MMHG | SYSTOLIC BLOOD PRESSURE: 107 MMHG | OXYGEN SATURATION: 98 % | RESPIRATION RATE: 16 BRPM | SYSTOLIC BLOOD PRESSURE: 107 MMHG | SYSTOLIC BLOOD PRESSURE: 120 MMHG | DIASTOLIC BLOOD PRESSURE: 62 MMHG | OXYGEN SATURATION: 94 % | SYSTOLIC BLOOD PRESSURE: 120 MMHG | DIASTOLIC BLOOD PRESSURE: 52 MMHG | HEART RATE: 61 BPM | RESPIRATION RATE: 14 BRPM | SYSTOLIC BLOOD PRESSURE: 109 MMHG | HEART RATE: 54 BPM | DIASTOLIC BLOOD PRESSURE: 75 MMHG | HEART RATE: 60 BPM | HEART RATE: 61 BPM | DIASTOLIC BLOOD PRESSURE: 68 MMHG | HEART RATE: 58 BPM | TEMPERATURE: 97.7 F | HEART RATE: 55 BPM | HEART RATE: 63 BPM | HEART RATE: 56 BPM | HEART RATE: 55 BPM | DIASTOLIC BLOOD PRESSURE: 52 MMHG | SYSTOLIC BLOOD PRESSURE: 109 MMHG | DIASTOLIC BLOOD PRESSURE: 67 MMHG | OXYGEN SATURATION: 94 % | HEART RATE: 59 BPM | SYSTOLIC BLOOD PRESSURE: 104 MMHG | SYSTOLIC BLOOD PRESSURE: 125 MMHG | RESPIRATION RATE: 16 BRPM

## 2024-09-24 ENCOUNTER — OFFICE (AMBULATORY)
Age: 84
End: 2024-09-24
Payer: MEDICARE

## 2024-09-24 ENCOUNTER — AMBULATORY SURGICAL CENTER (AMBULATORY)
Age: 84
End: 2024-09-24
Payer: MEDICARE

## 2024-09-24 ENCOUNTER — AMBULATORY SURGICAL CENTER (AMBULATORY)
Dept: URBAN - METROPOLITAN AREA SURGERY 17 | Facility: SURGERY | Age: 84
End: 2024-09-24
Payer: MEDICARE

## 2024-09-24 ENCOUNTER — OFFICE (AMBULATORY)
Dept: URBAN - METROPOLITAN AREA PATHOLOGY 4 | Facility: PATHOLOGY | Age: 84
End: 2024-09-24
Payer: MEDICARE

## 2024-09-24 DIAGNOSIS — D12.5 BENIGN NEOPLASM OF SIGMOID COLON: ICD-10-CM

## 2024-09-24 DIAGNOSIS — K57.30 DIVERTICULOSIS OF LARGE INTESTINE WITHOUT PERFORATION OR ABS: ICD-10-CM

## 2024-09-24 DIAGNOSIS — Z09 ENCOUNTER FOR FOLLOW-UP EXAMINATION AFTER COMPLETED TREATMEN: ICD-10-CM

## 2024-09-24 DIAGNOSIS — Z86.010 PERSONAL HISTORY OF COLON POLYPS: ICD-10-CM

## 2024-09-24 DIAGNOSIS — D17.79 BENIGN LIPOMATOUS NEOPLASM OF OTHER SITES: ICD-10-CM

## 2024-09-24 LAB
GI HISTOLOGY: A. SIGMOID COLON: (no result)
GI HISTOLOGY: PDF REPORT: (no result)

## 2024-09-24 PROCEDURE — 88305 TISSUE EXAM BY PATHOLOGIST: CPT | Performed by: PATHOLOGY

## 2024-09-24 PROCEDURE — 45385 COLONOSCOPY W/LESION REMOVAL: CPT | Mod: PT | Performed by: INTERNAL MEDICINE

## 2024-09-24 NOTE — SERVICEHPINOTES
84-year-old gentleman with a history of polyps.  I did a colonoscopy on him in March.  He had a 3 cm sigmoid polyp that was removed piecemeal.  He therefore presents for follow-up colonoscopy to evaluate for residual polyp.

## 2025-03-29 ENCOUNTER — READMISSION MANAGEMENT (OUTPATIENT)
Dept: CALL CENTER | Facility: HOSPITAL | Age: 85
End: 2025-03-29
Payer: MEDICARE

## 2025-03-29 NOTE — OUTREACH NOTE
Prep Survey      Flowsheet Row Responses   [facility] facility patient discharged from? Non-BH   Is LACE score < 7 ? Non-BH Discharge   Eligibility Not Eligible   What are the reasons patient is not eligible? Other  [no recent [facility]  appt]   Does the patient have one of the following disease processes/diagnoses(primary or secondary)? Other   Prep survey completed? Yes            MARCUS TORRES - Registered Nurse

## (undated) DEVICE — RECIPROCATING BLADE, DOUBLE SIDED, OFFSET  (70.0 X 0.64 X 12.6MM)

## (undated) DEVICE — UNDERCAST PADDING: Brand: DEROYAL

## (undated) DEVICE — GLV SURG SENSICARE W/ALOE PF LF 7.5 STRL

## (undated) DEVICE — PK KN TOTL 40

## (undated) DEVICE — NEEDLE, QUINCKE 22GX3.5": Brand: MEDLINE INDUSTRIES, INC.

## (undated) DEVICE — SUT VIC 0 CT1 36IN J946H

## (undated) DEVICE — NITINOL WIRE WITH HYDROPHILIC TIP: Brand: SENSOR

## (undated) DEVICE — GLV SURG SENSICARE PI PF LF 7 GRN STRL

## (undated) DEVICE — SOL NACL 0.9PCT 100ML SGL

## (undated) DEVICE — GLV SURG PREMIERPRO ORTHO LTX PF SZ7.5 BRN

## (undated) DEVICE — MEDI-VAC YANKAUER SUCTION HANDLE W/BULBOUS TIP: Brand: CARDINAL HEALTH

## (undated) DEVICE — CATH URETRL FLXITP POLLACK STD 5F 70CM

## (undated) DEVICE — PREP SOL POVIDONE/IODINE BT 4OZ

## (undated) DEVICE — STERILE PATIENT PROTECTIVE PAD FOR IMP® KNEE POSITIONERS & COHESIVE WRAP (10 / CASE): Brand: DE MAYO KNEE POSITIONER®

## (undated) DEVICE — STPLR SKIN VISISTAT WD 35CT

## (undated) DEVICE — GLV SURG SENSICARE W/ALOE PF LF 8 STRL

## (undated) DEVICE — NDL SPINE 22G 31/2IN BLK

## (undated) DEVICE — GLV SURG SENSICARE MICRO PF LF 7 STRL

## (undated) DEVICE — MAT FLR ABSORBENT LG 4FT 10 2.5FT

## (undated) DEVICE — PREMIUM WET SKIN PREP TRAY: Brand: MEDLINE INDUSTRIES, INC.

## (undated) DEVICE — 3M™ IOBAN™ 2 ANTIMICROBIAL INCISE DRAPE 6640EZ: Brand: IOBAN™ 2

## (undated) DEVICE — DRSNG WND GZ CURAD OIL EMULSION 3X8IN LF STRL 1PK

## (undated) DEVICE — APPL DURAPREP IODOPHOR APL 26ML

## (undated) DEVICE — RECIPROCATING BLADE HEAVY DUTY LONG, OFFSET  (77.6 X 0.77 X 11.2MM)

## (undated) DEVICE — GLV SURG BIOGEL LTX PF 7

## (undated) DEVICE — HYDROGEL COATED LATEX URINE METER FOLEY TRAY,16 FR/CH (5.3 MM), 5 ML CATHETER PRE-CONNECTED TO 2000 ML DRAINAGE BAG WITH NEEDLE SAMPLING: Brand: DOVER

## (undated) DEVICE — 2108 SERIES SAGITTAL BLADE, NO OFFSET  (12.4 X 1.19 X 82.1MM)

## (undated) DEVICE — PENCL E/S ULTRAVAC TELESCP NOSE HOLSTR 10FT

## (undated) DEVICE — LOU CYSTO: Brand: MEDLINE INDUSTRIES, INC.

## (undated) DEVICE — DUAL CUT SAGITTAL BLADE

## (undated) DEVICE — SUT VIC 1 CT1 36IN J947H

## (undated) DEVICE — GLV SURG SENSICARE PI MIC PF SZ7 LF STRL

## (undated) DEVICE — BNDG ELAS ELITE V/CLOSE 6IN 5YD LF STRL

## (undated) DEVICE — TRAP FLD MINIVAC MEGADYNE 100ML

## (undated) DEVICE — PICO SINGLE USE NEGATIVE PRESSURE WOUND THERAPY SYSTEM 10CM X 30CM 4IN. X 12IN.: Brand: PICO

## (undated) DEVICE — KT DRN EVAC WND PVC PCH WTROC RND 10F400

## (undated) DEVICE — ENCORE® LATEX ORTHO SIZE 7.5, STERILE LATEX POWDER-FREE SURGICAL GLOVE: Brand: ENCORE